# Patient Record
Sex: MALE | Race: BLACK OR AFRICAN AMERICAN | Employment: OTHER | URBAN - METROPOLITAN AREA
[De-identification: names, ages, dates, MRNs, and addresses within clinical notes are randomized per-mention and may not be internally consistent; named-entity substitution may affect disease eponyms.]

---

## 2017-12-12 ENCOUNTER — OFFICE VISIT (OUTPATIENT)
Dept: ORTHOPEDIC SURGERY | Age: 82
End: 2017-12-12

## 2017-12-12 VITALS
HEIGHT: 63 IN | RESPIRATION RATE: 16 BRPM | SYSTOLIC BLOOD PRESSURE: 142 MMHG | TEMPERATURE: 98 F | WEIGHT: 179 LBS | DIASTOLIC BLOOD PRESSURE: 90 MMHG | BODY MASS INDEX: 31.71 KG/M2 | HEART RATE: 58 BPM

## 2017-12-12 DIAGNOSIS — M47.816 LUMBAR FACET ARTHROPATHY: Primary | ICD-10-CM

## 2017-12-12 DIAGNOSIS — R26.89 BALANCE DISORDER: ICD-10-CM

## 2017-12-12 DIAGNOSIS — M51.36 DDD (DEGENERATIVE DISC DISEASE), LUMBAR: ICD-10-CM

## 2017-12-12 DIAGNOSIS — R26.9 GAIT ABNORMALITY: ICD-10-CM

## 2017-12-12 DIAGNOSIS — M62.838 MUSCLE SPASM: ICD-10-CM

## 2017-12-12 RX ORDER — MONTELUKAST SODIUM 10 MG/1
TABLET ORAL
Status: ON HOLD | COMMUNITY
Start: 2017-07-19 | End: 2019-09-17 | Stop reason: SDUPTHER

## 2017-12-12 RX ORDER — RIVASTIGMINE TARTRATE 1.5 MG/1
CAPSULE ORAL
Status: ON HOLD | COMMUNITY
Start: 2017-12-03 | End: 2019-09-17 | Stop reason: SDUPTHER

## 2017-12-12 RX ORDER — HYDROCODONE BITARTRATE AND ACETAMINOPHEN 5; 325 MG/1; MG/1
TABLET ORAL
COMMUNITY
Start: 2017-11-10 | End: 2019-03-21

## 2017-12-12 NOTE — PATIENT INSTRUCTIONS
Low Back Arthritis: Exercises  Your Care Instructions  Here are some examples of typical rehabilitation exercises for your condition. Start each exercise slowly. Ease off the exercise if you start to have pain. Your doctor or physical therapist will tell you when you can start these exercises and which ones will work best for you. When you are not being active, find a comfortable position for rest. Some people are comfortable on the floor or a medium-firm bed with a small pillow under their head and another under their knees. Some people prefer to lie on their side with a pillow between their knees. Don't stay in one position for too long. Take short walks (10 to 20 minutes) every 2 to 3 hours. Avoid slopes, hills, and stairs until you feel better. Walk only distances you can manage without pain, especially leg pain. How to do the exercises  Pelvic tilt    1. Lie on your back with your knees bent. 2. \"Brace\" your stomach-tighten your muscles by pulling in and imagining your belly button moving toward your spine. 3. Press your lower back into the floor. You should feel your hips and pelvis rock back. 4. Hold for 6 seconds while breathing smoothly. 5. Relax and allow your pelvis and hips to rock forward. 6. Repeat 8 to 12 times. Back stretches    1. Get down on your hands and knees on the floor. 2. Relax your head and allow it to droop. Round your back up toward the ceiling until you feel a nice stretch in your upper, middle, and lower back. Hold this stretch for as long as it feels comfortable, or about 15 to 30 seconds. 3. Return to the starting position with a flat back while you are on your hands and knees. 4. Let your back sway by pressing your stomach toward the floor. Lift your buttocks toward the ceiling. 5. Hold this position for 15 to 30 seconds. 6. Repeat 2 to 4 times. Follow-up care is a key part of your treatment and safety.  Be sure to make and go to all appointments, and call your doctor if you are having problems. It's also a good idea to know your test results and keep a list of the medicines you take. Where can you learn more? Go to http://hayde-roxanne.info/. Enter K667 in the search box to learn more about \"Low Back Arthritis: Exercises. \"  Current as of: March 21, 2017  Content Version: 11.4  © 4054-2942 [employer]. Care instructions adapted under license by [employer] (which disclaims liability or warranty for this information). If you have questions about a medical condition or this instruction, always ask your healthcare professional. [place] [address] any warranty or liability for your use of this information. Shoulder Arthritis: Exercises  Your Care Instructions  Here are some examples of typical rehabilitation exercises for your condition. Start each exercise slowly. Ease off the exercise if you start to have pain. Your doctor or physical therapist will tell you when you can start these exercises and which ones will work best for you. How to do the exercises  Shoulder flexion (lying down)    To make a wand for this exercise, use a piece of PVC pipe or a broom handle with the broom removed. Make the wand about a foot wider than your shoulders. 7. Lie on your back, holding a wand with both hands. Your palms should face down as you hold the wand. 8. Keeping your elbows straight, slowly raise your arms over your head. Raise them until you feel a stretch in your shoulders, upper back, and chest.  9. Hold for 15 to 30 seconds. 10. Repeat 2 to 4 times. Shoulder rotation (lying down)    To make a wand for this exercise, use a piece of PVC pipe or a broom handle with the broom removed. Make the wand about a foot wider than your shoulders. 7. Lie on your back. Hold a wand with both hands with your elbows bent and palms up.   8. Keep your elbows close to your body, and move the wand across your body toward the sore arm.  9. Hold for 8 to 12 seconds. 10. Repeat 2 to 4 times. Shoulder internal rotation with towel    1. Hold a towel above and behind your head with the arm that is not sore. 2. With your sore arm, reach behind your back and grasp the towel. 3. With the arm above your head, pull the towel upward. Do this until you feel a stretch on the front and outside of your sore shoulder. 4. Hold 15 to 30 seconds. 5. Repeat 2 to 4 times. Shoulder blade squeeze    1. Stand with your arms at your sides, and squeeze your shoulder blades together. Do not raise your shoulders up as you squeeze. 2. Hold 6 seconds. 3. Repeat 8 to 12 times. Resisted rows    For this exercise, you will need elastic exercise material, such as surgical tubing or Thera-Band. 1. Put the band around a solid object at about waist level. (A bedpost will work well.) Each hand should hold an end of the band. 2. With your elbows at your sides and bent to 90 degrees, pull the band back. Your shoulder blades should move toward each other. Return to the starting position. 3. Repeat 8 to 12 times. External rotator strengthening exercise    1. Start by tying a piece of elastic exercise material to a doorknob. You can use surgical tubing or Thera-Band. (You may also hold one end of the band in each hand.)  2. Stand or sit with your shoulder relaxed and your elbow bent 90 degrees. Your upper arm should rest comfortably against your side. Squeeze a rolled towel between your elbow and your body for comfort. This will help keep your arm at your side. 3. Hold one end of the elastic band with the hand of the painful arm. 4. Start with your forearm across your belly. Slowly rotate the forearm out away from your body. Keep your elbow and upper arm tucked against the towel roll or the side of your body until you begin to feel tightness in your shoulder. Slowly move your arm back to where you started. 5. Repeat 8 to 12 times.   Internal rotator strengthening exercise    1. Start by tying a piece of elastic exercise material to a doorknob. You can use surgical tubing or Thera-Band. 2. Stand or sit with your shoulder relaxed and your elbow bent 90 degrees. Your upper arm should rest comfortably against your side. Squeeze a rolled towel between your elbow and your body for comfort. This will help keep your arm at your side. 3. Hold one end of the elastic band in the hand of the painful arm. 4. Slowly rotate your forearm toward your body until it touches your belly. Slowly move it back to where you started. 5. Keep your elbow and upper arm firmly tucked against the towel roll or at your side. 6. Repeat 8 to 12 times. Pendulum swing    If you have pain in your back, do not do this exercise. 1. Hold on to a table or the back of a chair with your good arm. Then bend forward a little and let your sore arm hang straight down. This exercise does not use the arm muscles. Rather, use your legs and your hips to create movement that makes your arm swing freely. 2. Use the movement from your hips and legs to guide the slightly swinging arm back and forth like a pendulum (or elephant trunk). Then guide it in circles that start small (about the size of a dinner plate). Make the circles a bit larger each day, as your pain allows. 3. Do this exercise for 5 minutes, 5 to 7 times each day. 4. As you have less pain, try bending over a little farther to do this exercise. This will increase the amount of movement at your shoulder. Follow-up care is a key part of your treatment and safety. Be sure to make and go to all appointments, and call your doctor if you are having problems. It's also a good idea to know your test results and keep a list of the medicines you take. Where can you learn more? Go to http://hayde-roxanne.info/. Enter H562 in the search box to learn more about \"Shoulder Arthritis: Exercises. \"  Current as of: March 21, 2017  Content Version: 11.4  © 2544-6430 [employer]. Care instructions adapted under license by [employer] (which disclaims liability or warranty for this information). If you have questions about a medical condition or this instruction, always ask your healthcare professional. [address] any warranty or liability for your use of this information.

## 2017-12-12 NOTE — PROGRESS NOTES
[facility]  [address], [facility] [address]  Phone: [phone]  Fax: [phone]    NEW PATIENT    ASSESSMENT   Diagnoses and all orders for this visit:    1. Lumbar facet arthropathy  -     REFERRAL TO PHYSICAL THERAPY    2. Muscle spasm  -     REFERRAL TO PHYSICAL THERAPY    3. Gait abnormality  -     REFERRAL TO PHYSICAL THERAPY    4. Balance disorder  -     REFERRAL TO PHYSICAL THERAPY    5. DDD (degenerative disc disease), lumbar  -     REFERRAL TO PHYSICAL THERAPY         IMPRESSION AND PLAN:  Pyeton Francois is a 80 y.o. male with history of lumbar pain. He complains of pain across the lower back and denies any pain radiating down the legs at this time. Pt ambulates with the assistance of a rolling walker and denies any weakness in the legs at this time. He has been using Norco 5-325 mg every other day as his pain warrants. 1) Pt was given information on lumbar and shoulder arthritis exercises. 2) Discussed treatment options with the Pt, including medication, physical therapy, and steroid injections. 3) He was encouraged to continue ambulating with the assistance of a rolling walker. 4) Pt was referred to [facility] physical therapy for balance, gait, and strengthening. 5) I recommended the patient try Tylenol Arthritis 1-2 tabs QAM and 1-2 tabs QHS prn. 6) I encouraged the patient to try moist heat. 7) Mr. Pansy Cranker has a reminder for a \"due or due soon\" health maintenance. I have asked that he contact his primary care provider, Arben Barajas MD, for follow-up on this health maintenance. 8)  demonstrated consistency with prescribing. 9) Pt will follow-up in 6 weeks or sooner if needed. HISTORY OF PRESENT ILLNESS:  Peyton Francois is a 80 y.o. right hand dominant male with history of lumbar pain. Pt presents to the office today as a new patient referred by Dr. Kathy Paulino.  He complains of pain across the lower back and denies any pain radiating down the legs at this time. Pt ambulates with the assistance of a rolling walker, admits to chronic issues with balance, and denies any weakness or numbness in the legs at this time. He used to be followed by a chiropractor when he was younger. Pt denies any inciting injuries. He woke up during the middle of the night about 1 month ago to use the bathroom but experienced severe pain in the lower back. Pt then went to the ER due to the lower back pain where he was prescribed Norco 5-325 mg. He states that he generally takes the Norco every other day as his pain warrants. Pt is not on blood thinners and admits that he does not regularly take aspirin. He is diabetic, states that his blood sugars are well controlled at this time, and reports that his last A1C was below 7. Pt at this time desires to proceed with a referral to [facility] physical therapy and medication evaluation. Pt reports that when he retired from [employer] he became a . Of note, patient has prostate cancer years ago and is unsure if this was related to prostate cancer. He denies any prior joint replacements. Pain Scale: 2/10     PCP: Clyde Carpenter MD      Past Medical History:   Diagnosis Date    Arthritis     Cancer [facility])     Coronary atherosclerosis of native coronary artery     AS DESCRIBED IN CATH CARDIAC CATH IN 9/07 SHOWS 80% DISTAL LAD AND 40% PROXIMAL LAD DISEASE . HE IS ON MEDICAL TREATMENT. ASA AND B-BLOCKER 3/10 RENAL ARTERY DUPLEX : NL RT RENAL ARTERY WITH < 60% STENOSIS OF LEFT RENAL ARTERY    Diabetes ([address]     Glaucoma     Hypertension     Impotence of organic origin     Malignant neoplasm of prostate ([address]     Other and unspecified hyperlipidemia         Social History     Social History    Marital status:      Spouse name: N/A    Number of children: N/A    Years of education: N/A     Occupational History    Not on file.      Social History Main Topics    Smoking status: Never Smoker    Smokeless tobacco: Never Used    Alcohol use No    Drug use: Not on file    Sexual activity: Not on file     Other Topics Concern    Not on file     Social History Narrative       Current Outpatient Prescriptions   Medication Sig Dispense Refill    montelukast (SINGULAIR) 10 mg tablet TAKE ONE TABLET BY MOUTH EVERY NIGHT AT BEDTIME      rivastigmine tartrate (EXELON) 1.5 mg capsule TAKE ONE CAPSULE BY MOUTH TWICE DAILY WITH MEALS      SITagliptin (JANUVIA) 100 mg tablet TAKE 1 TABLET BY MOUTH ONCE DAILY      HYDROcodone-acetaminophen (NORCO) 5-325 mg per tablet Take 1 Tab by Mouth Every 6 Hours As Needed for Pain.  cloNIDine (CATAPRES-TTS-2) 0.2 mg/24 hr patch 1 Patch by TransDERmal route every seven (7) days.  valsartan-hydrochlorothiazide (DIOVAN HCT) 160-12.5 mg per tablet Take 1 Tab by mouth daily.  pioglitazone (ACTOS) 45 mg tablet Take  by mouth daily.  alfuzosin SR (UROXATRAL) 10 mg SR tablet Take  by mouth nightly as needed.  FERROUS SULFATE (FEOSOL PO) Take  by mouth two (2) times a day.  simvastatin (ZOCOR) 80 mg tablet Take 80 mg by mouth daily.  ramipril (ALTACE) 5 mg capsule Take  by mouth daily.  metoprolol-XL (TOPROL XL) 50 mg XL tablet Take  by mouth daily.  amlodipine (NORVASC) 5 mg tablet Take 5 mg by mouth daily.  fexofenadine (ALLEGRA) 60 mg tablet Take 60 mg by mouth two (2) times a day.  aspirin (ASPIRIN) 325 mg tablet Take 325 mg by mouth daily. No Known Allergies    REVIEW OF SYSTEMS    Constitutional: Negative for fever, chills, or weight change. Respiratory: Negative for cough or shortness of breath. Cardiovascular: Negative for chest pain or palpitations. Gastrointestinal: Negative for acid reflux, change in bowel habits, or constipation. Genitourinary: Negative for dysuria and flank pain. Musculoskeletal: Positive for lumbar pain. Skin: Negative for rash. Neurological: Negative for headaches, dizziness, or numbness. Endo/Heme/Allergies: Negative for increased bruising. Psychiatric/Behavioral: Negative for difficulty with sleep. PHYSICAL EXAMINATION  Visit Vitals    /90    Pulse (!) 58    Temp 98 °F (36.7 °C) (Oral)    Resp 16    Ht 5' 3\" (1.6 m)    Wt 179 lb (81.2 kg)    BMI 31.71 kg/m2       Constitutional: Awake, alert, and in no acute distress. HEENT: Normocephalic. Atraumatic. Oropharynx is moist and clear. PERRL. EOMI. Sclerae are nonicteric  Heart: Regular rate and rhythm  Lungs: Clear to auscultation bilaterally  Abdomen: Soft and nontender. Bowel sounds are present  Neurological: 1+ symmetrical DTRs in the upper extremities. 1+ symmetrical DTRs in the lower extremities. Sensation to light touch is intact. Negative Surya's sign bilaterally. Skin: warm, dry, and intact. Musculoskeletal: Good range of motion in the cervical spine on all planes. Abduction of the left arm to 90 degrees. Tenderness to palpation in the lower lumbar region. Moderate pain with extension and axial loading. Better with forward flexion. No pain with internal or external rotation of his hips. Negative straight leg raise bilaterally. Patient ambulates with the assistance of a rolling walker. Biceps  Triceps Deltoids Wrist Ext Wrist Flex Hand Intrin   Right +4/5 +4/5 +4/5 +4/5 +4/5 +4/5   Left +4/5 +4/5 +4/5 +4/5 +4/5 +4/5      Hip Flex  Quads Hamstrings Ankle DF EHL Ankle PF   Right +4/5 +4/5 +4/5 +4/5 +4/5 +4/5   Left +4/5 +4/5 +4/5 +4/5 +4/5 +4/5     IMAGING:    Lumbar spine x-rays from 11/10/2017 were personally reviewed with the patient and demonstrated:  FINDINGS:    AP, lateral, and bilateral oblique views of the lumbar spine are submitted for evaluation.     There is no evidence of fracture or subluxation.      Mild levocurvature of the lumbar spine.  There is osteophytosis at all levels.  There is mild disc height narrowing at the most levels most noted at L1/2 and L2/3.  There are facet degenerative changes of the lower lumbar spine.  There are degenerative changes of the visualized SI joints and hips.  Osseous mineralization is radiographically decreased.  There are multiple surgical clips in the pelvis. IMPRESSION:  1.  Osteopenia.  Multilevel degenerative changes.  No acute fracture. Written by Ayah Parada, as dictated by Louis Mckenzie MD.  I, Dr. Louis Mckenzie confirm that all documentation is accurate.

## 2017-12-12 NOTE — MR AVS SNAPSHOT
Visit Information Date & Time Provider Department Dept. Phone Encounter #  
 12/12/2017 10:00 AM Gwendolyn Samaniego, 27 [facility]  Follow-up Instructions Return in about 6 weeks (around 1/23/2018) for PT follow up. Upcoming Health Maintenance Date Due DTaP/Tdap/Td series (1 - Tdap) 3/11/1950 ZOSTER VACCINE AGE 60> 1/11/1989 GLAUCOMA SCREENING Q2Y 3/11/1994 Pneumococcal 65+ High/Highest Risk (1 of 2 - PCV13) 3/11/1994 MEDICARE YEARLY EXAM 3/11/1994 Influenza Age 5 to Adult 8/1/2017 Allergies as of 12/12/2017  Review Complete On: 12/12/2017 By: Gwendolyn Samaniego MD  
 No Known Allergies Current Immunizations  Never Reviewed No immunizations on file. Not reviewed this visit You Were Diagnosed With   
  
 Codes Comments Lumbar facet arthropathy    -  Primary ICD-10-CM: M12.88 ICD-9-CM: 721.3 Muscle spasm     ICD-10-CM: Q93.892 ICD-9-CM: 728.85 Gait abnormality     ICD-10-CM: R26.9 ICD-9-CM: 837. 2 Balance disorder     ICD-10-CM: R26.89 
ICD-9-CM: 781.99   
 DDD (degenerative disc disease), lumbar     ICD-10-CM: M51.36 
ICD-9-CM: 722.52 Vitals BP Pulse Temp Resp Height(growth percentile) Weight(growth percentile) 142/90 (!) 58 98 °F (36.7 °C) (Oral) 16 5' 3\" (1.6 m) 179 lb (81.2 kg) BMI Smoking Status 31.71 kg/m2 Never Smoker BMI and BSA Data Body Mass Index Body Surface Area 31.71 kg/m 2 1.9 m 2 Preferred Pharmacy Pharmacy Name Phone [facility] [phone] [address], [address] [place] [address] [phone] Your Updated Medication List  
  
   
This list is accurate as of: 12/12/17 11:37 AM.  Always use your most recent med list.  
  
  
  
  
 ACTOS 45 mg tablet Generic drug:  pioglitazone Take  by mouth daily. ALLEGRA 60 mg tablet Generic drug:  fexofenadine Take 60 mg by mouth two (2) times a day. ALTACE 5 mg capsule Generic drug:  ramipril Take  by mouth daily. aspirin 325 mg tablet Commonly known as:  ASPIRIN Take 325 mg by mouth daily. CATAPRES-TTS-2 0.2 mg/24 hr patch Generic drug:  cloNIDine 1 Patch by TransDERmal route every seven (7) days. DIOVAN -12.5 mg per tablet Generic drug:  valsartan-hydroCHLOROthiazide Take 1 Tab by mouth daily. FEOSOL PO Take  by mouth two (2) times a day. HYDROcodone-acetaminophen 5-325 mg per tablet Commonly known as:  March Castles Take 1 Tab by Mouth Every 6 Hours As Needed for Pain. JANUVIA 100 mg tablet Generic drug:  SITagliptin TAKE 1 TABLET BY MOUTH ONCE DAILY  
  
 montelukast 10 mg tablet Commonly known as:  SINGULAIR  
TAKE ONE TABLET BY MOUTH EVERY NIGHT AT BEDTIME  
  
 NORVASC 5 mg tablet Generic drug:  amLODIPine Take 5 mg by mouth daily. rivastigmine tartrate 1.5 mg capsule Commonly known as:  EXELON  
TAKE ONE CAPSULE BY MOUTH TWICE DAILY WITH MEALS  
  
 simvastatin 80 mg tablet Commonly known as:  ZOCOR Take 80 mg by mouth daily. TOPROL XL 50 mg XL tablet Generic drug:  metoprolol succinate Take  by mouth daily. UROXATRAL 10 mg SR tablet Generic drug:  alfuzosin SR Take  by mouth nightly as needed. We Performed the Following REFERRAL TO PHYSICAL THERAPY [QDH15 Custom] Comments:  
 Eval/Treat Aqua Therapy, balance/gait/strengthening. Follow-up Instructions Return in about 6 weeks (around 1/23/2018) for PT follow up. Referral Information Referral ID Referred By Referred To  
  
 [id number] Nimesh Askew Not Available Visits Status Start Date End Date 1 New Request 12/12/17 12/12/18 If your referral has a status of pending review or denied, additional information will be sent to support the outcome of this decision. Patient Instructions Low Back Arthritis: Exercises Your Care Instructions Here are some examples of typical rehabilitation exercises for your condition. Start each exercise slowly. Ease off the exercise if you start to have pain. Your doctor or physical therapist will tell you when you can start these exercises and which ones will work best for you. When you are not being active, find a comfortable position for rest. Some people are comfortable on the floor or a medium-firm bed with a small pillow under their head and another under their knees. Some people prefer to lie on their side with a pillow between their knees. Don't stay in one position for too long. Take short walks (10 to 20 minutes) every 2 to 3 hours. Avoid slopes, hills, and stairs until you feel better. Walk only distances you can manage without pain, especially leg pain. How to do the exercises Pelvic tilt 1. Lie on your back with your knees bent. 2. \"Brace\" your stomach-tighten your muscles by pulling in and imagining your belly button moving toward your spine. 3. Press your lower back into the floor. You should feel your hips and pelvis rock back. 4. Hold for 6 seconds while breathing smoothly. 5. Relax and allow your pelvis and hips to rock forward. 6. Repeat 8 to 12 times. Back stretches 1. Get down on your hands and knees on the floor. 2. Relax your head and allow it to droop. Round your back up toward the ceiling until you feel a nice stretch in your upper, middle, and lower back. Hold this stretch for as long as it feels comfortable, or about 15 to 30 seconds. 3. Return to the starting position with a flat back while you are on your hands and knees. 4. Let your back sway by pressing your stomach toward the floor. Lift your buttocks toward the ceiling. 5. Hold this position for 15 to 30 seconds. 6. Repeat 2 to 4 times. Follow-up care is a key part of your treatment and safety.  Be sure to make and go to all appointments, and call your doctor if you are having problems. It's also a good idea to know your test results and keep a list of the medicines you take. Where can you learn more? Go to http://hayde-roxanne.info/. Enter J907 in the search box to learn more about \"Low Back Arthritis: Exercises. \" Current as of: March 21, 2017 Content Version: 11.4 © 1216-3933 [employer]. Care instructions adapted under license by [employer] (which disclaims liability or warranty for this information). If you have questions about a medical condition or this instruction, always ask your healthcare professional. [address] any warranty or liability for your use of this information. Shoulder Arthritis: Exercises Your Care Instructions Here are some examples of typical rehabilitation exercises for your condition. Start each exercise slowly. Ease off the exercise if you start to have pain. Your doctor or physical therapist will tell you when you can start these exercises and which ones will work best for you. How to do the exercises Shoulder flexion (lying down) To make a wand for this exercise, use a piece of PVC pipe or a broom handle with the broom removed. Make the wand about a foot wider than your shoulders. 7. Lie on your back, holding a wand with both hands. Your palms should face down as you hold the wand. 8. Keeping your elbows straight, slowly raise your arms over your head. Raise them until you feel a stretch in your shoulders, upper back, and chest. 
9. Hold for 15 to 30 seconds. 10. Repeat 2 to 4 times. Shoulder rotation (lying down) To make a wand for this exercise, use a piece of PVC pipe or a broom handle with the broom removed. Make the wand about a foot wider than your shoulders. 7. Lie on your back. Hold a wand with both hands with your elbows bent and palms up. 8. Keep your elbows close to your body, and move the wand across your body toward the sore arm. 9. Hold for 8 to 12 seconds. 10. Repeat 2 to 4 times. Shoulder internal rotation with towel 1. Hold a towel above and behind your head with the arm that is not sore. 2. With your sore arm, reach behind your back and grasp the towel. 3. With the arm above your head, pull the towel upward. Do this until you feel a stretch on the front and outside of your sore shoulder. 4. Hold 15 to 30 seconds. 5. Repeat 2 to 4 times. Shoulder blade squeeze 1. Stand with your arms at your sides, and squeeze your shoulder blades together. Do not raise your shoulders up as you squeeze. 2. Hold 6 seconds. 3. Repeat 8 to 12 times. Resisted rows For this exercise, you will need elastic exercise material, such as surgical tubing or Thera-Band. 1. Put the band around a solid object at about waist level. (A bedpost will work well.) Each hand should hold an end of the band. 2. With your elbows at your sides and bent to 90 degrees, pull the band back. Your shoulder blades should move toward each other. Return to the starting position. 3. Repeat 8 to 12 times. External rotator strengthening exercise 1. Start by tying a piece of elastic exercise material to a doorknob. You can use surgical tubing or Thera-Band. (You may also hold one end of the band in each hand.) 2. Stand or sit with your shoulder relaxed and your elbow bent 90 degrees. Your upper arm should rest comfortably against your side. Squeeze a rolled towel between your elbow and your body for comfort. This will help keep your arm at your side. 3. Hold one end of the elastic band with the hand of the painful arm. 4. Start with your forearm across your belly. Slowly rotate the forearm out away from your body.  Keep your elbow and upper arm tucked against the towel roll or the side of your body until you begin to feel tightness in your shoulder. Slowly move your arm back to where you started. 5. Repeat 8 to 12 times. Internal rotator strengthening exercise 1. Start by tying a piece of elastic exercise material to a doorknob. You can use surgical tubing or Thera-Band. 2. Stand or sit with your shoulder relaxed and your elbow bent 90 degrees. Your upper arm should rest comfortably against your side. Squeeze a rolled towel between your elbow and your body for comfort. This will help keep your arm at your side. 3. Hold one end of the elastic band in the hand of the painful arm. 4. Slowly rotate your forearm toward your body until it touches your belly. Slowly move it back to where you started. 5. Keep your elbow and upper arm firmly tucked against the towel roll or at your side. 6. Repeat 8 to 12 times. Pendulum swing If you have pain in your back, do not do this exercise. 1. Hold on to a table or the back of a chair with your good arm. Then bend forward a little and let your sore arm hang straight down. This exercise does not use the arm muscles. Rather, use your legs and your hips to create movement that makes your arm swing freely. 2. Use the movement from your hips and legs to guide the slightly swinging arm back and forth like a pendulum (or elephant trunk). Then guide it in circles that start small (about the size of a dinner plate). Make the circles a bit larger each day, as your pain allows. 3. Do this exercise for 5 minutes, 5 to 7 times each day. 4. As you have less pain, try bending over a little farther to do this exercise. This will increase the amount of movement at your shoulder. Follow-up care is a key part of your treatment and safety. Be sure to make and go to all appointments, and call your doctor if you are having problems. It's also a good idea to know your test results and keep a list of the medicines you take. Where can you learn more? Go to http://hayde-roxanne.info/. Enter H562 in the search box to learn more about \"Shoulder Arthritis: Exercises. \" Current as of: March 21, 2017 Content Version: 11.4 © 1730-5610 Healthwise, [employer]. Care instructions adapted under license by [employer] (which disclaims liability or warranty for this information). If you have questions about a medical condition or this instruction, always ask your healthcare professional. [address] any warranty or liability for your use of this information. Introducing [facility]! Tia Mariscal introduces [employer] patient portal. Now you can access parts of your medical record, email your doctor's office, and request medication refills online. 1. In your internet browser, go to [email or web address] 2. Click on the First Time User? Click Here link in the Sign In box. You will see the New Member Sign Up page. 3. Enter your [employer] Access Code exactly as it appears below. You will not need to use this code after youve completed the sign-up process. If you do not sign up before the expiration date, you must request a new code. · [employer] Access Code: [email or web address] Expires: 3/12/2018 11:37 AM 
 
4. Enter the last four digits of your Social Security Number (xxxx) and Date of Birth (mm/dd/yyyy) as indicated and click Submit. You will be taken to the next sign-up page. 5. Create a [employer] ID. This will be your [employer] login ID and cannot be changed, so think of one that is secure and easy to remember. 6. Create a [employer] password. You can change your password at any time. 7. Enter your Password Reset Question and Answer. This can be used at a later time if you forget your password. 8. Enter your e-mail address. You will receive e-mail notification when new information is available in [address]. 9. Click Sign Up. You can now view and download portions of your medical record. 10. Click the Download Summary menu link to download a portable copy of your medical information. If you have questions, please visit the Frequently Asked Questions section of the [employer] website. Remember, [employer] is NOT to be used for urgent needs. For medical emergencies, dial 911. Now available from your iPhone and Android! Please provide this summary of care documentation to your next provider. Your primary care clinician is listed as Ade Gray. If you have any questions after today's visit, please call [phone].

## 2017-12-18 ENCOUNTER — HOSPITAL ENCOUNTER (OUTPATIENT)
Dept: PHYSICAL THERAPY | Age: 82
Discharge: HOME | End: 2017-12-18
Payer: MEDICARE

## 2017-12-18 PROCEDURE — 97162 PT EVAL MOD COMPLEX 30 MIN: CPT

## 2017-12-18 PROCEDURE — G8979 MOBILITY GOAL STATUS: HCPCS

## 2017-12-18 PROCEDURE — G8978 MOBILITY CURRENT STATUS: HCPCS

## 2017-12-18 PROCEDURE — 97112 NEUROMUSCULAR REEDUCATION: CPT

## 2017-12-18 PROCEDURE — 97110 THERAPEUTIC EXERCISES: CPT

## 2017-12-18 NOTE — MR AVS SNAPSHOT
Visit Information Date & Time Provider Department Dept. Phone Encounter #  
 12/18/2017 11:00 AM Rachel Leisa, [address]  [place] [facility] [id number] Your Appointments 1/23/2018 10:30 AM  
Follow Up with Loyda Martinez MD  
[facility]) Appt Note: 6 wk f/u  
 [address] [facility] [address]  
[phone]  
  
   
 [address] [facility] [address] Upcoming Health Maintenance Date Due DTaP/Tdap/Td series (1 - Tdap) 3/11/1950 ZOSTER VACCINE AGE 60> 1/11/1989 GLAUCOMA SCREENING Q2Y 3/11/1994 Pneumococcal 65+ High/Highest Risk (1 of 2 - PCV13) 3/11/1994 MEDICARE YEARLY EXAM 3/11/1994 Influenza Age 5 to Adult 8/1/2017 Allergies as of 12/18/2017  Review Complete On: 12/12/2017 By: Loyda Martinez MD  
 No Known Allergies Current Immunizations  Never Reviewed No immunizations on file. Not reviewed this visit Vitals Smoking Status Never Smoker Your Updated Medication List  
  
ASK your doctor about these medications ACTOS 45 mg tablet Generic drug:  pioglitazone Take  by mouth daily. ALLEGRA 60 mg tablet Generic drug:  fexofenadine Take 60 mg by mouth two (2) times a day. ALTACE 5 mg capsule Generic drug:  ramipril Take  by mouth daily. aspirin 325 mg tablet Commonly known as:  ASPIRIN Take 325 mg by mouth daily. CATAPRES-TTS-2 0.2 mg/24 hr patch Generic drug:  cloNIDine 1 Patch by TransDERmal route every seven (7) days. DIOVAN -12.5 mg per tablet Generic drug:  valsartan-hydroCHLOROthiazide Take 1 Tab by mouth daily. FEOSOL PO Take  by mouth two (2) times a day. HYDROcodone-acetaminophen 5-325 mg per tablet Commonly known as:  Consuelo Hobson Take 1 Tab by Mouth Every 6 Hours As Needed for Pain. JANUVIA 100 mg tablet Generic drug:  SITagliptin TAKE 1 TABLET BY MOUTH ONCE DAILY  
  
 montelukast 10 mg tablet Commonly known as:  SINGULAIR  
TAKE ONE TABLET BY MOUTH EVERY NIGHT AT BEDTIME  
  
 NORVASC 5 mg tablet Generic drug:  amLODIPine Take 5 mg by mouth daily. rivastigmine tartrate 1.5 mg capsule Commonly known as:  EXELON  
TAKE ONE CAPSULE BY MOUTH TWICE DAILY WITH MEALS  
  
 simvastatin 80 mg tablet Commonly known as:  ZOCOR Take 80 mg by mouth daily. TOPROL XL 50 mg XL tablet Generic drug:  metoprolol succinate Take  by mouth daily. UROXATRAL 10 mg SR tablet Generic drug:  alfuzosin SR Take  by mouth nightly as needed. Introducing [facility]! Esperanza Paredes introduces [employer] patient portal. Now you can access parts of your medical record, email your doctor's office, and request medication refills online. 1. In your internet browser, go to [email or web address] 2. Click on the First Time User? Click Here link in the Sign In box. You will see the New Member Sign Up page. 3. Enter your [employer] Access Code exactly as it appears below. You will not need to use this code after youve completed the sign-up process. If you do not sign up before the expiration date, you must request a new code. · [employer] Access Code: [email or web address] Expires: 3/12/2018 11:37 AM 
 
4. Enter the last four digits of your Social Security Number (xxxx) and Date of Birth (mm/dd/yyyy) as indicated and click Submit. You will be taken to the next sign-up page. 5. Create a [employer] ID. This will be your [employer] login ID and cannot be changed, so think of one that is secure and easy to remember. 6. Create a [employer] password. You can change your password at any time. 7. Enter your Password Reset Question and Answer. This can be used at a later time if you forget your password. 8. Enter your e-mail address.  You will receive e-mail notification when new information is available in [employer]. 9. Click Sign Up. You can now view and download portions of your medical record. 10. Click the Download Summary menu link to download a portable copy of your medical information. If you have questions, please visit the Frequently Asked Questions section of the [employer] website. Remember, [employer] is NOT to be used for urgent needs. For medical emergencies, dial 911. Now available from your iPhone and Android! Please provide this summary of care documentation to your next provider. Your primary care clinician is listed as Trinidad Kidd. If you have any questions after today's visit, please call [phone].

## 2017-12-18 NOTE — PROGRESS NOTES
PT DAILY TREATMENT NOTE - [facility]     Patient Name: Anh Salazar  Date:2017  : 3/11/1929  [x]  Patient  Verified  Payor: Karmen Eisenberg / Plan: [place] & [place] / Product Type: Medicare /    In time:11:15  Out time:12:05  Total Treatment Time (min): 50  Total Timed Codes (min): 23  1:1 Treatment Time ( [address] only): 50   Visit #: 1 of 8    Treatment Area: Unsteadiness on feet [R26.81]  Other abnormalities of gait and mobility [R26.89]    SUBJECTIVE  Pain Level (0-10 scale): 0/10  Any medication changes, allergies to medications, adverse drug reactions, diagnosis change, or new procedure performed?: [x] No    [] Yes (see summary sheet for update)  Subjective functional status/changes:   [] No changes reported  The patient has a chief complaint of LBP and unsteadiness on his feet with an occurrence of 3 falls.     OBJECTIVE    Modality rationale:  to improve the patients ability to    Min Type Additional Details    [] Estim:  []Unatt       []IFC  []Premod                        []Other:  []w/ice   []w/heat  Position:  Location:    [] Estim: []Att    []TENS instruct  []NMES                    []Other:  []w/US   []w/ice   []w/heat  Position:  Location:    []  Traction: [] Cervical       []Lumbar                       [] Prone          []Supine                       []Intermittent   []Continuous Lbs:  [] before manual  [] after manual    []  Ultrasound: []Continuous   [] Pulsed                           []1MHz   []3MHz W/cm2:  Location:    []  Iontophoresis with dexamethasone         Location: [] Take home patch   [] In clinic    []  Ice     []  heat  []  Ice massage  []  Laser   []  Anodyne Position:  Location:    []  Laser with stim  []  Other:  Position:  Location:    []  Vasopneumatic Device Pressure:       [] lo [] med [] hi   Temperature: [] lo [] med [] hi   [] Skin assessment post-treatment:  []intact []redness- no adverse reaction    []redness - adverse reaction:     27 min [x]Eval []Re-Eval       10 min Therapeutic Exercise:  [x] See flow sheet :   Rationale: increase ROM and increase strength to improve the patients ability to improve ADL ease. 13 min Neuromuscular Re-education:  [x]  See flow sheet :   Rationale: improve coordination, improve balance and increase proprioception  to improve the patients ability to improve ADL ease. With   [] TE   [] TA   [] neuro   [] other: Patient Education: [x] Review HEP    [] Progressed/Changed HEP based on:   [] positioning   [] body mechanics   [] transfers   [] heat/ice application    [] other:      Other Objective/Functional Measures: See IE     Pain Level (0-10 scale) post treatment: 0/10    ASSESSMENT/Changes in Function: See POC. Patient will continue to benefit from skilled PT services to modify and progress therapeutic interventions, address functional mobility deficits, address ROM deficits, address strength deficits, analyze and address soft tissue restrictions, analyze and cue movement patterns, analyze and modify body mechanics/ergonomics, assess and modify postural abnormalities and instruct in home and community integration to attain remaining goals. []  See Plan of Care  []  See progress note/recertification  []  See Discharge Summary         Progress towards goals / Updated goals:  Short Term Goals: To be accomplished in 2 weeks:                        1. The patient will be independent and compliant with HEP to maximize therapeutic benefit. Long Term Goals: To be accomplished in 4 weeks:                        1. The patient will improve FOTO score to 66 lumbar in order to better improve quality of life. 2. The patient will display TUG of 20\" without AD in order to improve efficiency of home ambulation. 3. The patient will improve DGI to 14/24 in order to reduce falls risk.                         4. The patient will demonstrate sit to stand 30\" chair rise test to 8 repetitions in order to maximize efficiency of transfers in the home.     PLAN  []  Upgrade activities as tolerated     [x]  Continue plan of care  []  Update interventions per flow sheet       []  Discharge due to:_  []  Other:_      Bree Celeste, PT 12/18/2017  1:48 PM    Future Appointments  Date Time Provider Kaleigh Marybeth   12/29/2017 11:00 AM Yohana Lila [address] [facility]   1/2/2018 2:00 PM Ala Tay, PTA [facility]   1/4/2018 11:30 AM Shivani Sol, PTA [facility]   1/9/2018 11:30 AM Bree Celeste, PT [facility]   1/11/2018 11:30 AM Shivani Sol, PTA [facility]   1/16/2018 11:30 AM Ala Tay, PTA [facility]   1/18/2018 11:30 AM Shivani Sol, PTA [facility]   1/23/2018 10:30 AM Donna Monreal  [address]   1/24/2018 11:30 AM Bree Celeste, PT [facility]   1/25/2018 11:30 AM Shivani Sol, PTA [facility]

## 2017-12-18 NOTE — PROGRESS NOTES
[facility]  [address]  [place], [address]  [phone] fax    Plan of Care/ Statement of Necessity for Physical Therapy Services    Patient name: Charlene Dave Start of Care: 2017   Referral source: Ezra Barbosa MD : 3/11/1929    Medical Diagnosis: Unsteadiness on feet [R26.81]  Other abnormalities of gait and mobility [R26.89]   Onset Date:1 month ago Lumbar pain, 12 months ago balance    Treatment Diagnosis: LBP/poor balance   Prior Hospitalization: see medical history Provider#: [id number]   Medications: Verified on Patient summary List    Comorbidities: Diabetes, HTN, hx prostate cancer with related surgery   Prior Level of Function: The patient states that he was able to ambulate void of RW and did not have falls prior to 1 year ago. The Plan of Care and following information is based on the information from the initial evaluation. Assessment/ key information: The patient is an 80year old male with a chief complaint of poor balance and occasional back pain. He states his back pain onset started about 1 month ago with an insidious onset, but has been well controlled with medication. He indicates radiographs were taken which did show arthritic changes per patient report. States he began using a RW about 1 year ago due suffering 3 falls, all non-specific events, but the patient does indicate that they were during dynamic gait and walking of some sort. He has signs and symptoms consistent with mechanical low back and and decreased balance with impairments consisting of intermittent pain, decreased ROM, decreasdd flexibility, decreased balance, positive falls risk, and decreased efficiency of transfers. The patient will benefit from skilled PT in order to address the aformentioned impairments. He does present with RW, and negotiates stairs with step to pattern utilizing B HR for safety.  Due to height of aquatics steps and limited security upon descent with only 1 handrail as well as patient marking incontinence issues on FOTO assessment, it was recommended to the patient to engage in land-based physical therapy, of which the grand-daughter and caretaker agreed. The patient is happy with this plan of care. Evaluation Complexity History HIGH Complexity :3+ comorbidities / personal factors will impact the outcome/ POC ; Examination MEDIUM Complexity : 3 Standardized tests and measures addressing body structure, function, activity limitation and / or participation in recreation  ;Presentation MEDIUM Complexity : Evolving with changing characteristics  ; Clinical Decision Making MEDIUM Complexity : FOTO score of 26-74  Overall Complexity Rating: MEDIUM  Problem List: pain affecting function, decrease ROM, decrease strength, impaired gait/ balance, decrease ADL/ functional abilitiies, decrease activity tolerance, decrease flexibility/ joint mobility and decrease transfer abilities   Treatment Plan may include any combination of the following: Therapeutic exercise, Therapeutic activities, Neuromuscular re-education, Physical agent/modality, Gait/balance training, Manual therapy, Patient education and Functional mobility training  Patient / Family readiness to learn indicated by: asking questions, trying to perform skills and interest  Persons(s) to be included in education: patient (P) and family support person (FSP);list patient's grand-daughter acting as caretaker  Barriers to Learning/Limitations: None  Patient Goal (s): to get around better per verbal goal per patient  Patient Self Reported Health Status: fair  Rehabilitation Potential: fair    Short Term Goals: To be accomplished in 2 weeks:   1. The patient will be independent and compliant with HEP to maximize therapeutic benefit. Long Term Goals: To be accomplished in 4 weeks:   1.  The patient will improve FOTO score to 66 lumbar in order to better improve quality of life.   2. The patient will display TUG of 20\" without AD in order to improve efficiency of home ambulation. 3. The patient will improve DGI to 14/24 in order to reduce falls risk. 4. The patient will demonstrate sit to stand 30\" chair rise test to 8 repetitions in order to maximize efficiency of transfers in the home. Frequency / Duration: Patient to be seen 2 times per week for 4 weeks. Patient/ Caregiver education and instruction: Diagnosis, prognosis, self care, activity modification and exercises   [x]  Plan of care has been reviewed with PTA    G-Codes (GP)  Mobility   Current  CK= 40-59%   Goal  CJ= 20-39%      The severity rating is based on clinical judgment and the FOTO score. Certification Period: 12/18/2017 - 2/18/2017  Frank Ayala, PT 12/18/2017 1:36 PM    ________________________________________________________________________    I certify that the above Therapy Services are being furnished while the patient is under my care. I agree with the treatment plan and certify that this therapy is necessary.     [de-identified] Signature:____________________  Date:____________Time: _________    Please sign and return to [facility]  [address] [facility] [address]  [place], [address]  [phone] fax

## 2018-01-02 ENCOUNTER — HOSPITAL ENCOUNTER (OUTPATIENT)
Dept: PHYSICAL THERAPY | Age: 83
Discharge: HOME | End: 2018-01-02
Payer: MEDICARE

## 2018-01-02 PROCEDURE — 97110 THERAPEUTIC EXERCISES: CPT

## 2018-01-02 PROCEDURE — 97112 NEUROMUSCULAR REEDUCATION: CPT

## 2018-01-04 ENCOUNTER — APPOINTMENT (OUTPATIENT)
Dept: PHYSICAL THERAPY | Age: 83
End: 2018-01-04
Payer: MEDICARE

## 2018-01-09 ENCOUNTER — HOSPITAL ENCOUNTER (OUTPATIENT)
Dept: PHYSICAL THERAPY | Age: 83
Discharge: HOME | End: 2018-01-09
Payer: MEDICARE

## 2018-01-09 PROCEDURE — 97110 THERAPEUTIC EXERCISES: CPT

## 2018-01-09 PROCEDURE — 97112 NEUROMUSCULAR REEDUCATION: CPT

## 2018-01-09 NOTE — PROGRESS NOTES
PT DAILY TREATMENT NOTE - [facility]     Patient Name: Shilo Ramirez  Date:2018  : 3/11/1929  [x]  Patient  Verified  Payor: Randell Mins / Plan: [place] / Product Type: Medicare /    In time:11:30  Out time: 12:08  Total Treatment Time (min): 38  Total Timed Codes (min): 38  1:1 Treatment Time ([facility] only): 38   Visit #: 3 of 8    Treatment Area: Unsteadiness on feet [R26.81]  Other abnormalities of gait and mobility [R26.89]    SUBJECTIVE  Pain Level (0-10 scale): 0/10  Any medication changes, allergies to medications, adverse drug reactions, diagnosis change, or new procedure performed?: [x] No    [] Yes (see summary sheet for update)  Subjective functional status/changes:   [] No changes reported  The patient stated that he has been doing fairly well. OBJECTIVE  28 min Therapeutic Exercise:  [x] See flow sheet :   Rationale: increase ROM and increase strength to improve the patients ability to improve ADL ease. 10 min Neuromuscular Re-education:  [x]  See flow sheet :   Rationale: improve coordination, improve balance and increase proprioception  to improve the patients ability to improve ADL ease. With   [] TE   [] TA   [] neuro   [] other: Patient Education: [x] Review HEP    [] Progressed/Changed HEP based on:   [] positioning   [] body mechanics   [] transfers   [] heat/ice application    [] other:      Other Objective/Functional Measures:   Tandem stance improvement with ambulation. Noted improvement with sit to stand no UEs. Pain Level (0-10 scale) post treatment: 0/10    ASSESSMENT/Changes in Function: Good progress with dynamic and static balance.     Patient will continue to benefit from skilled PT services to modify and progress therapeutic interventions, address functional mobility deficits, address ROM deficits, address strength deficits, analyze and address soft tissue restrictions, analyze and cue movement patterns, analyze and modify body mechanics/ergonomics, assess and modify postural abnormalities and instruct in home and community integration to attain remaining goals. []  See Plan of Care  []  See progress note/recertification  []  See Discharge Summary         Progress towards goals / Updated goals:  Short Term Goals: To be accomplished in 2 weeks:                        7. The patient will be independent and compliant with HEP to maximize therapeutic benefit. - Pt reports HEP compliance. 1/2/2018  Long Term Goals: To be accomplished in 4 weeks:                        2. The patient will improve FOTO score to 66 lumbar in order to better improve quality of life.                        2. The patient will display TUG of 20\" without AD in order to improve efficiency of home ambulation.                        3. The patient will improve DGI to 14/24 in order to reduce falls risk.                        4. The patient will demonstrate sit to stand 30\" chair rise test to 8 repetitions in order to maximize efficiency of transfers in the home.     PLAN  []  Upgrade activities as tolerated     [x]  Continue plan of care  []  Update interventions per flow sheet       []  Discharge due to:_  []  Other:_      Antoinette Higuera PT 1/9/2018  1:28 PM    Future Appointments  Date Time Provider Kaleigh Rueda   1/11/2018 11:30 AM April Blocker, PTA [facility]   1/16/2018 11:30 AM Nely Hunt PTA [facility]   1/18/2018 11:30 AM April Blocker, PTA [facility]   1/23/2018 10:30 AM Duane Ensign,  [address]   1/24/2018 11:30 AM Antoinette Higuera PT [facility]   1/25/2018 11:30 AM April Blocker, PTA [facility]

## 2018-01-11 ENCOUNTER — HOSPITAL ENCOUNTER (OUTPATIENT)
Dept: PHYSICAL THERAPY | Age: 83
Discharge: HOME | End: 2018-01-11
Payer: MEDICARE

## 2018-01-11 PROCEDURE — 97110 THERAPEUTIC EXERCISES: CPT

## 2018-01-11 PROCEDURE — 97116 GAIT TRAINING THERAPY: CPT

## 2018-01-11 NOTE — PROGRESS NOTES
PT DAILY TREATMENT NOTE - [facility]     Patient Name: Charlee Brambila  Date:2018  : 3/11/1929  [x]  Patient  Verified  Payor: Yoanna Hoyt / Plan: [place] MED[place]ARE PART A & B / Product Type: Medicare /    In time:12:00  Out time:12:30  Total Treatment Time (min): 30  Total Timed Codes (min): 30  1:1 Treatment Time ( only): 30   Visit #: 4 of 8    Treatment Area: Unsteadiness on feet [R26.81]  Other abnormalities of gait and mobility [R26.89]    SUBJECTIVE  Pain Level (0-10 scale): 0/10  Any medication changes, allergies to medications, adverse drug reactions, diagnosis change, or new procedure performed?: [x] No    [] Yes (see summary sheet for update)  Subjective functional status/changes:   [] No changes reported  Pt reports no new complaints. Pt reports feeling tired. OBJECTIVE    12 min Therapeutic Exercise:  [x] See flow sheet :   Rationale: increase ROM and increase strength to improve the patients ability to tolerate ADLs. 10 min Neuromuscular Re-education:  [x]  See flow sheet :   Rationale: increase strength, improve coordination and increase proprioception  to improve the patients ability to perform functional activities. 8 min Gait Training: tug test and gait training per flow sheet   Rationale: With   [] TE   [] TA   [] neuro   [] other: Patient Education: [x] Review HEP    [] Progressed/Changed HEP based on:   [] positioning   [] body mechanics   [] transfers   [] heat/ice application    [] other:      Other Objective/Functional Measures: Pt performed tug test 32 seconds     Pain Level (0-10 scale) post treatment: 0/10    ASSESSMENT/Changes in Function: Pt requires vc's to use walker properly and when ambulating further distances.     Patient will continue to benefit from skilled PT services to modify and progress therapeutic interventions, address functional mobility deficits, address ROM deficits, address strength deficits, analyze and address soft tissue restrictions, analyze and cue movement patterns and analyze and modify body mechanics/ergonomics to attain remaining goals. []  See Plan of Care  []  See progress note/recertification  []  See Discharge Summary         Progress towards goals / Updated goals:  Short Term Goals: To be accomplished in 2 weeks:                        5. The patient will be independent and compliant with HEP to maximize therapeutic benefit. - Pt reports HEP compliance. 1/2/2018  Long Term Goals: To be accomplished in 4 weeks:                        1. The patient will improve FOTO score to 66 lumbar in order to better improve quality of life.                        2. The patient will display TUG of 20\" without AD in order to improve efficiency of home ambulation. - not met TUG 32 \". 1/11/18                        3. The patient will improve DGI to 14/24 in order to reduce falls risk.                        4. The patient will demonstrate sit to stand 30\" chair rise test to 8 repetitions in order to maximize efficiency of transfers in the home.     PLAN  []  Upgrade activities as tolerated     []  Continue plan of care  []  Update interventions per flow sheet       []  Discharge due to:_  []  Other:_      Steve Whelan PTA 1/11/2018  12:55 PM    Future Appointments  Date Time Provider Kaleigh Rueda   1/16/2018 11:30 AM Trista Fong PTA [facility]   1/18/2018 11:30 AM Steve Whelan PTA [facility]   1/23/2018 10:30 AM Lisa Graves  [address]   1/24/2018 11:30 AM Rema Brooks PT [facility]   1/25/2018 11:30 AM Steve Whelan PTA [facility]

## 2018-01-16 ENCOUNTER — HOSPITAL ENCOUNTER (OUTPATIENT)
Dept: PHYSICAL THERAPY | Age: 83
Discharge: HOME | End: 2018-01-16
Payer: MEDICARE

## 2018-01-16 PROCEDURE — 97110 THERAPEUTIC EXERCISES: CPT

## 2018-01-16 PROCEDURE — 97112 NEUROMUSCULAR REEDUCATION: CPT

## 2018-01-18 ENCOUNTER — APPOINTMENT (OUTPATIENT)
Dept: PHYSICAL THERAPY | Age: 83
End: 2018-01-18
Payer: MEDICARE

## 2018-01-23 ENCOUNTER — OFFICE VISIT (OUTPATIENT)
Dept: ORTHOPEDIC SURGERY | Age: 83
End: 2018-01-23

## 2018-01-23 VITALS
OXYGEN SATURATION: 95 % | SYSTOLIC BLOOD PRESSURE: 189 MMHG | HEIGHT: 63 IN | BODY MASS INDEX: 31.89 KG/M2 | WEIGHT: 180 LBS | DIASTOLIC BLOOD PRESSURE: 80 MMHG | HEART RATE: 75 BPM | TEMPERATURE: 98.4 F

## 2018-01-23 DIAGNOSIS — M62.838 MUSCLE SPASM: ICD-10-CM

## 2018-01-23 DIAGNOSIS — M47.816 LUMBAR FACET ARTHROPATHY: Primary | ICD-10-CM

## 2018-01-23 DIAGNOSIS — R26.9 GAIT ABNORMALITY: ICD-10-CM

## 2018-01-23 NOTE — PROGRESS NOTES
[facility]  [address] [place], [facility] [address]  Phone: [phone]  Fax: [phone]      ASSESSMENT   Diagnoses and all orders for this visit:    1. Lumbar facet arthropathy    2. Muscle spasm    3. Gait abnormality         IMPRESSION AND PLAN:  Juan Miguel Prabhakar is a 80 y.o. right hand dominant male with history of lumbar pain. He tried physical therapy since his last office visit with substantial improvement. Pt reports experiencing no lower back pain at this time. 1) Pt was given information on lumbar arthritis exercises. 2) I encouraged the patient to continue with home exercises learned through physical therapy,   3) I recommended the patient try chair yoga. 4) He may take Tylenol Extra Strength as needed. 5) I encouraged the patient to continue using his rolling walker. 6) Mr. Francisca Toussaint has a reminder for a \"due or due soon\" health maintenance. I have asked that he contact his primary care provider, Cornelius Castaneda MD, for follow-up on this health maintenance. 7)  demonstrated consistency with prescribing. 8) Pt will follow-up as needed. HISTORY OF PRESENT ILLNESS:  Juan Miguel Prabhakar is a 80 y.o. right hand dominant male with history of lumbar pain. He tried physical therapy since his last office visit with substantial improvement. Pt states that he is experiencing no lower back pain at this time. He denies any issues with balance or changing positions. Pt states that he perform home exercises at home regularly. Of note, he lives with his granddaughter who makes sure he performed his daily exercises. He denies any association of his pain with changes in weather. He is diabetic and per patient, his blood sugars are well controlled. He presents to the office today with hypertension and is on Diovan, Toprol, clonidine, and ramipril. Pt takes aspirin as needed.  Pt at this time desires to continue with current care.    Pain Scale: 0 - No pain/10    PCP: Yoav Myers MD       Past Medical History:   Diagnosis Date    Arthritis     Cancer [facility])     Coronary atherosclerosis of native coronary artery     AS DESCRIBED IN CATH CARDIAC CATH IN 9/07 SHOWS 80% DISTAL LAD AND 40% PROXIMAL LAD DISEASE . HE IS ON MEDICAL TREATMENT. ASA AND B-BLOCKER 3/10 RENAL ARTERY DUPLEX : NL RT RENAL ARTERY WITH < 60% STENOSIS OF LEFT RENAL ARTERY    Diabetes ([facility] [address]     Glaucoma     Hypertension     Impotence of organic origin     Malignant neoplasm of prostate ([facility] [address]     Other and unspecified hyperlipidemia         Social History     Social History    Marital status:      Spouse name: N/A    Number of children: N/A    Years of education: N/A     Occupational History    Not on file. Social History Main Topics    Smoking status: Never Smoker    Smokeless tobacco: Never Used    Alcohol use No    Drug use: Not on file    Sexual activity: Not on file     Other Topics Concern    Not on file     Social History Narrative       Current Outpatient Prescriptions   Medication Sig Dispense Refill    montelukast (SINGULAIR) 10 mg tablet TAKE ONE TABLET BY MOUTH EVERY NIGHT AT BEDTIME      rivastigmine tartrate (EXELON) 1.5 mg capsule TAKE ONE CAPSULE BY MOUTH TWICE DAILY WITH MEALS      SITagliptin (JANUVIA) 100 mg tablet TAKE 1 TABLET BY MOUTH ONCE DAILY      HYDROcodone-acetaminophen (NORCO) 5-325 mg per tablet Take 1 Tab by Mouth Every 6 Hours As Needed for Pain.  FERROUS SULFATE (FEOSOL PO) Take  by mouth two (2) times a day.  cloNIDine (CATAPRES-TTS-2) 0.2 mg/24 hr patch 1 Patch by TransDERmal route every seven (7) days.  simvastatin (ZOCOR) 80 mg tablet Take 80 mg by mouth daily.  ramipril (ALTACE) 5 mg capsule Take  by mouth daily.  metoprolol-XL (TOPROL XL) 50 mg XL tablet Take  by mouth daily.  valsartan-hydrochlorothiazide (DIOVAN HCT) 160-12.5 mg per tablet Take 1 Tab by mouth daily.  amlodipine (NORVASC) 5 mg tablet Take 5 mg by mouth daily.  pioglitazone (ACTOS) 45 mg tablet Take  by mouth daily.  alfuzosin SR (UROXATRAL) 10 mg SR tablet Take  by mouth nightly as needed.  fexofenadine (ALLEGRA) 60 mg tablet Take 60 mg by mouth two (2) times a day.  aspirin (ASPIRIN) 325 mg tablet Take 81 mg by mouth daily. No Known Allergies      REVIEW OF SYSTEMS    Constitutional: Negative for fever, chills, or weight change. Respiratory: Negative for cough or shortness of breath. Cardiovascular: Negative for chest pain or palpitations. Gastrointestinal: Negative for acid reflux, change in bowel habits, or constipation. Genitourinary: Negative for dysuria and flank pain. Musculoskeletal: Positive for lumbar pain. Skin: Negative for rash. Neurological: Negative for headaches, dizziness, or numbness. Endo/Heme/Allergies: Negative for increased bruising. Psychiatric/Behavioral: Negative for difficulty with sleep. PHYSICAL EXAMINATION  Visit Vitals    /80    Pulse 75    Temp 98.4 °F (36.9 °C) (Oral)    Ht 5' 3\" (1.6 m)    Wt 180 lb (81.6 kg)    SpO2 95%    BMI 31.89 kg/m2       Constitutional: Awake, alert, and in no acute distress. Neurological:1+ symmetrical DTRs in the lower extremities. Sensation to light touch is intact. Skin: warm, dry, and intact. Musculoskeletal: Minimal tenderness to palpation in the lower lumbar region. No pain with extension, axial loading, or forward flexion. No pain with internal or external rotation of his hips. Negative straight leg raise bilaterally. Patient ambulates with the assistance of a rolling walker.      Hip Flex  Quads Hamstrings Ankle DF EHL Ankle PF   Right +4/5 +4/5 +4/5 +4/5 +4/5 +4/5   Left +4/5 +4/5 +4/5 +4/5 +4/5 +4/5     IMAGING:    Lumbar spine x-rays from 11/10/2017 were personally reviewed with the patient and demonstrated:  FINDINGS:    AP, lateral, and bilateral oblique views of the lumbar spine are submitted for evaluation. There is no evidence of fracture or subluxation.      Mild levocurvature of the lumbar spine.  There is osteophytosis at all levels.  There is mild disc height narrowing at the most levels most noted at L1/2 and L2/3.  There are facet degenerative changes of the lower lumbar spine.  There are degenerative changes of the visualized SI joints and hips.  Osseous mineralization is radiographically decreased.  There are multiple surgical clips in the pelvis.     IMPRESSION:  1.  Osteopenia.  Multilevel degenerative changes.  No acute fracture. Written by [facility], as dictated by Klever Westfall MD.  I, Dr. Klever Westfall confirm that all documentation is accurate.

## 2018-01-23 NOTE — PATIENT INSTRUCTIONS

## 2018-01-24 ENCOUNTER — HOSPITAL ENCOUNTER (OUTPATIENT)
Dept: PHYSICAL THERAPY | Age: 83
Discharge: HOME | End: 2018-01-24
Payer: MEDICARE

## 2018-01-24 PROCEDURE — G8979 MOBILITY GOAL STATUS: HCPCS

## 2018-01-24 PROCEDURE — 97110 THERAPEUTIC EXERCISES: CPT

## 2018-01-24 PROCEDURE — G8978 MOBILITY CURRENT STATUS: HCPCS

## 2018-01-24 PROCEDURE — 97112 NEUROMUSCULAR REEDUCATION: CPT

## 2018-01-24 NOTE — PROGRESS NOTES
[facility]  [address]  [place], [address]  [phone] fax    Medicare Progress Report    Patient name: Gerhardt Bell Start of Care: 2017   Referral source: Mercy Jo MD : 3/11/1929                         Medical Diagnosis: Unsteadiness on feet [R26.81]  Other abnormalities of gait and mobility [R26.89] Onset Date:1 month ago Lumbar pain, 12 months ago balance                         Treatment Diagnosis: LBP/poor balance   Prior Hospitalization: see medical history Provider#: [id number]   Medications: Verified on Patient summary List    Comorbidities: Diabetes, HTN, hx prostate cancer with related surgery   Prior Level of Function: The patient states that he was able to ambulate void of RW and did not have falls prior to 1 year ago. Visits from Start of Care: 6    Missed Visits: 0    Reporting Period: 2017 to 2018    Subjective Reports: The patient states that he had a fall over the weekend. He indicates he was not using his walker in the home, and was walking with his wife to the restroom when she pulled on his arm (she has dementia) and he fell forward uninjured. Current Status/ treatment goals Objective measures   Short Term Goals: To be accomplished in 2 weeks:                        1. The patient will be independent and compliant with HEP to maximize therapeutic benefit. - Pt reports HEP compliance. 2018  Long Term Goals: To be accomplished in 4 weeks:                        2. The patient will improve FOTO score to 66 lumbar in order to better improve quality of life. - 71%. 2018                        2. The patient will display TUG of 20\" without AD in order to improve efficiency of home ambulation. - not met TUG 32 \". 18                        3. The patient will improve DGI to 14/24 in order to reduce falls risk. Met - 182018                        4.  The patient will demonstrate sit to stand 30\" chair rise test to 8 repetitions in order to maximize efficiency of transfers in the home. - Sit to stand from chair 7x in 30\". 2018; Met 8x 1/24/2018       Key functional changes:   DGI:   TU\"  9x sit to stand: 30\"        Problems/ barriers to goal attainment: None     Assessment / Recommendations: The patient has progressed significantly with regards to his balance per objective measures (DGI, sit to stands, TUG), despite recent reports of fall. The patient has not had pain in his lumbar spine since initiating PT. He has been compliant with HEP. Problem List: pain affecting function, decrease ROM, decrease strength, impaired gait/ balance, decrease ADL/ functional abilitiies, decrease activity tolerance and decrease flexibility/ joint mobility   Treatment Plan: Therapeutic exercise, Therapeutic activities, Neuromuscular re-education, Physical agent/modality, Gait/balance training, Manual therapy and Patient education    Patient Goal (s) has been updated and includes: Improve balance     Updated Goals to be accomplished in 2 weeks:  1. The patient will display independence with updated HEP. 2. The patient will demonstrate TUG to 18\" in order to maximize ease of transfers and negotiation of rooms in home. Frequency / Duration: Patient to be seen 2 times per week for 2 weeks:    G-Codes (GP)  Mobility  [id number] Current  CJ= 20-39%   Goal  CJ= 20-39%    The severity rating is based on clinical judgment and the FOTO score.       Gila Davila, PT 2018 1:15 PM

## 2018-01-24 NOTE — PROGRESS NOTES
PT DAILY TREATMENT NOTE - [facility]     Patient Name: Cedrick Nelson  Date:2018  : 3/11/1929  [x]  Patient  Verified  Payor: Kip Payor / Plan: [place] MED[place] & [place] / Product Type: Medicare /    In time: 11:32  Out time: 12:10  Total Treatment Time (min): 38  Total Timed Codes (min): 38  1:1 Treatment Time ( only): 38   Visit #: 6 of 8    Treatment Area: Unsteadiness on feet [R26.81]  Other abnormalities of gait and mobility [R26.89]    SUBJECTIVE  Pain Level (0-10 scale): 0/10  Any medication changes, allergies to medications, adverse drug reactions, diagnosis change, or new procedure performed?: [x] No    [] Yes (see summary sheet for update)  Subjective functional status/changes:   [] No changes reported  The patient states that he had a fall over the weekend. He indicates he was not using his walker in the home, and was walking with his wife to the restroom when she pulled on his arm (she has dementia) and he fell forward uninjured. OBJECTIVE  10 min Therapeutic Exercise:  [x] See flow sheet :   Rationale: increase ROM and increase strength to improve the patients ability to improve ADL ease. 28 min Neuromuscular Re-education:  [x]  See flow sheet :   Rationale: improve coordination, improve balance and increase proprioception  to improve the patients ability to improve ADL ease. With   [] TE   [] TA   [] neuro   [] other: Patient Education: [x] Review HEP    [] Progressed/Changed HEP based on:   [] positioning   [] body mechanics   [] transfers   [] heat/ice application    [] other:      Other Objective/Functional Measures:   DGI:   TU\"  9x sit to stand: 30\"     Pain Level (0-10 scale) post treatment: 0/10    ASSESSMENT/Changes in Function: The patient has progressed significantly with regards to his balance per objective measures (DGI, sit to stands, TUG), despite recent reports of fall. He has been compliant with HEP.     Patient will continue to benefit from skilled PT services to modify and progress therapeutic interventions, address functional mobility deficits, address ROM deficits, address strength deficits, analyze and address soft tissue restrictions, analyze and cue movement patterns, analyze and modify body mechanics/ergonomics, assess and modify postural abnormalities, address imbalance/dizziness and instruct in home and community integration to attain remaining goals. []  See Plan of Care  []  See progress note/recertification  []  See Discharge Summary         Progress towards goals / Updated goals:  Short Term Goals: To be accomplished in 2 weeks:                        7. The patient will be independent and compliant with HEP to maximize therapeutic benefit. - Pt reports HEP compliance. 1/2/2018  Long Term Goals: To be accomplished in 4 weeks:                        9. The patient will improve FOTO score to 66 lumbar in order to better improve quality of life. - 71%. 1/16/2018                        2. The patient will display TUG of 20\" without AD in order to improve efficiency of home ambulation. - not met TUG 32 \". 1/11/18                        3. The patient will improve DGI to 14/24 in order to reduce falls risk. Met - 18/24 1/24/2018                        4. The patient will demonstrate sit to stand 30\" chair rise test to 8 repetitions in order to maximize efficiency of transfers in the home. - Sit to stand from chair 7x in 30\". 1/16/2018;  Met 8x 1/24/2018    PLAN  []  Upgrade activities as tolerated     [x]  Continue plan of care  []  Update interventions per flow sheet       []  Discharge due to:_  []  Other:_      Nasir Leahy, PT 1/24/2018  12:15 PM    Future Appointments  Date Time Provider Kaleigh Rueda   1/25/2018 2:30 PM Edward Troy PTA [id number]

## 2018-01-25 ENCOUNTER — HOSPITAL ENCOUNTER (OUTPATIENT)
Dept: PHYSICAL THERAPY | Age: 83
Discharge: HOME | End: 2018-01-25
Payer: MEDICARE

## 2018-01-25 PROCEDURE — 97110 THERAPEUTIC EXERCISES: CPT

## 2018-01-25 PROCEDURE — 97116 GAIT TRAINING THERAPY: CPT

## 2018-01-25 NOTE — PROGRESS NOTES
PT DAILY TREATMENT NOTE - [facility]     Patient Name: Jasvir Akhtar  Date:2018  : 3/11/1929  [x]  Patient  Verified  Payor: Don Gottron / Plan: [place] MEDICARE PART A & B / Product Type: Medicare /    In time:2:30  Out time:3:00  Total Treatment Time (min): 30  Total Timed Codes (min): 30  1:1 Treatment Time ( [address] only): 30   Visit #: 1 of 4    Treatment Area: Unsteadiness on feet [R26.81]  Other abnormalities of gait and mobility [R26.89]    SUBJECTIVE  Pain Level (0-10 scale): 0/10  Any medication changes, allergies to medications, adverse drug reactions, diagnosis change, or new procedure performed?: [x] No    [] Yes (see summary sheet for update)  Subjective functional status/changes:   [] No changes reported  Pt reports no new complaints. OBJECTIVE    22 min Therapeutic Exercise:  [x] See flow sheet :   Rationale: increase ROM and increase strength to improve the patients ability to tolerate ADLs. 8 min Gait Training:  Dynamic gait activities with no  device on level surfaces with SBA level of assist   Rationale: With   [] TE   [] TA   [] neuro   [] other: Patient Education: [x] Review HEP    [] Progressed/Changed HEP based on:   [] positioning   [] body mechanics   [] transfers   [] heat/ice application    [] other:      Other Objective/Functional Measures: TUG 21 seconds. Pain Level (0-10 scale) post treatment: 0/10    ASSESSMENT/Changes in Function: Pt demonstrates improved efficiency with TUG but has not met current goal time of 18 seconds. Patient will continue to benefit from skilled PT services to modify and progress therapeutic interventions, address functional mobility deficits, address ROM deficits, address strength deficits, analyze and address soft tissue restrictions, analyze and cue movement patterns and analyze and modify body mechanics/ergonomics to attain remaining goals.      []  See Plan of Care  []  See progress note/recertification  []  See Discharge Summary         Progress towards goals / Updated goals:  1. The patient will display independence with updated HEP. 2. The patient will demonstrate TUG to 18\" in order to maximize ease of transfers and negotiation of rooms in home. - not met to performed in 21\". 1/25/18    PLAN  []  Upgrade activities as tolerated     [x]  Continue plan of care  []  Update interventions per flow sheet       []  Discharge due to:_  []  Other:_      Barbara Martinez, PTA 1/25/2018  2:42 PM    No future appointments.

## 2018-01-30 ENCOUNTER — HOSPITAL ENCOUNTER (OUTPATIENT)
Dept: PHYSICAL THERAPY | Age: 83
Discharge: HOME | End: 2018-01-30
Payer: MEDICARE

## 2018-01-30 PROCEDURE — 97112 NEUROMUSCULAR REEDUCATION: CPT

## 2018-01-30 PROCEDURE — 97110 THERAPEUTIC EXERCISES: CPT

## 2018-01-30 NOTE — PROGRESS NOTES
PT DAILY TREATMENT NOTE - [facility]     Patient Name: Juan Miguel Prabhakar  Date:2018  : 3/11/1929  [x]  Patient  Verified  Payor: Nova Mendoza / Plan: [place] MEDICARE PART A & B / Product Type: Medicare /    In time:12:00  Out time:12:42  Total Treatment Time (min): 42  Total Timed Codes (min): 42  1:1 Treatment Time ( only): 42   Visit #: 2 of 4    Treatment Area: Unsteadiness on feet [R26.81]  Other abnormalities of gait and mobility [R26.89]    SUBJECTIVE  Pain Level (0-10 scale): 0/10  Any medication changes, allergies to medications, adverse drug reactions, diagnosis change, or new procedure performed?: [x] No    [] Yes (see summary sheet for update)  Subjective functional status/changes:   [] No changes reported  The patient states that he feels good upon arrival. Denies recent LOB or falls. OBJECTIVE  10 min Therapeutic Exercise:  [x] See flow sheet :   Rationale: increase ROM and increase strength to improve the patients ability to improve ADL ease. 32 min Neuromuscular Re-education:  [x]  See flow sheet :   Rationale: improve coordination, improve balance and increase proprioception  to improve the patients ability to improve ADL ease. With   [] TE   [] TA   [] neuro   [] other: Patient Education: [x] Review HEP    [] Progressed/Changed HEP based on:   [] positioning   [] body mechanics   [] transfers   [] heat/ice application    [] other:      Other Objective/Functional Measures:   Good dynamic balance appreciated during obstacle course. The patient able to grasp items from floor without experiencing LOB. Pain Level (0-10 scale) post treatment: 0/10    ASSESSMENT/Changes in Function: Progressing fairly well with regards to dynamic balance.     Patient will continue to benefit from skilled PT services to modify and progress therapeutic interventions, address functional mobility deficits, address ROM deficits, address strength deficits, analyze and address soft tissue restrictions, analyze and cue movement patterns, analyze and modify body mechanics/ergonomics, assess and modify postural abnormalities, address imbalance/dizziness and instruct in home and community integration to attain remaining goals. []  See Plan of Care  []  See progress note/recertification  []  See Discharge Summary         Progress towards goals / Updated goals:  Progress towards goals / Updated goals:  1. The patient will display independence with updated HEP. 2. The patient will demonstrate TUG to 18\" in order to maximize ease of transfers and negotiation of rooms in home. - not met to performed in 21\".  1/25/18     PLAN  []  Upgrade activities as tolerated     [x]  Continue plan of care  []  Update interventions per flow sheet       []  Discharge due to:_  []  Other:_      Yuik Collazo, PT 1/30/2018  1:44 PM    Future Appointments  Date Time Provider Kaleigh Rueda   2/1/2018 12:00 PM [facility]   2/6/2018 10:30 AM Stoney Barney [facility]

## 2018-02-01 ENCOUNTER — HOSPITAL ENCOUNTER (OUTPATIENT)
Dept: PHYSICAL THERAPY | Age: 83
Discharge: HOME | End: 2018-02-01
Payer: MEDICARE

## 2018-02-01 PROCEDURE — 97116 GAIT TRAINING THERAPY: CPT

## 2018-02-01 PROCEDURE — G8979 MOBILITY GOAL STATUS: HCPCS

## 2018-02-01 PROCEDURE — G8980 MOBILITY D/C STATUS: HCPCS

## 2018-02-01 PROCEDURE — 97110 THERAPEUTIC EXERCISES: CPT

## 2018-02-01 NOTE — PROGRESS NOTES
PT DAILY TREATMENT NOTE - [facility]     Patient Name: Gerhardt Bell  Date:2018  : 3/11/1929  [x]  Patient  Verified  Payor: VA MEDICARE / Plan: [place] MEDICARE PART A & B / Product Type: Medicare /    In time:12:00  Out time:12:26  Total Treatment Time (min): 26  Total Timed Codes (min): 26  1:1 Treatment Time ( [address] only): 26   Visit #: 3 of 4    Treatment Area: Unsteadiness on feet [R26.81]  Other abnormalities of gait and mobility [R26.89]    SUBJECTIVE  Pain Level (0-10 scale): 0/10  Any medication changes, allergies to medications, adverse drug reactions, diagnosis change, or new procedure performed?: [x] No    [] Yes (see summary sheet for update)  Subjective functional status/changes:   [] No changes reported  Pt reports no new complaints. Pt reports continued compliance with HEP. OBJECTIVE    16 min Therapeutic Exercise:  [x] See flow sheet :   Rationale: increase ROM and increase strength to improve the patients ability to tolerate ADLs. 10 min Gait Training:  TUG, Dynamic gait activities per flow sheet. No  device on level surfaces with SBA level of assist   Rationale: With   [] TE   [] TA   [] neuro   [] other: Patient Education: [x] Review HEP    [] Progressed/Changed HEP based on:   [] positioning   [] body mechanics   [] transfers   [] heat/ice application    [] other:      Other Objective/Functional Measures: No LOB with gait activities. Pain Level (0-10 scale) post treatment: 0/10    ASSESSMENT/Changes in Function: Pt demonstrates improved functional mobility and safe ambulation when performing gait training. Patient will continue to benefit from skilled PT services to modify and progress therapeutic interventions, address functional mobility deficits, address ROM deficits, address strength deficits, analyze and address soft tissue restrictions, analyze and cue movement patterns and analyze and modify body mechanics/ergonomics to attain remaining goals.      [] See Plan of Care  []  See progress note/recertification  []  See Discharge Summary         Progress towards goals / Updated goals:  1. The patient will display independence with updated HEP. 2. The patient will demonstrate TUG to 18\" in order to maximize ease of transfers and negotiation of rooms in home. - not met to performed in 21\".  1/25/18    PLAN  []  Upgrade activities as tolerated     [x]  Continue plan of care  []  Update interventions per flow sheet       []  Discharge due to:_  []  Other:_      Bernadette Patino PTA 2/1/2018  12:29 PM    Future Appointments  Date Time Provider Kaleigh Rueda   2/6/2018 10:30 AM [id number] [facility]

## 2018-02-06 ENCOUNTER — APPOINTMENT (OUTPATIENT)
Dept: PHYSICAL THERAPY | Age: 83
End: 2018-02-06
Payer: MEDICARE

## 2018-02-07 NOTE — PROGRESS NOTES
[facility]  [address]  [place], [address]  [phone] fax    Physical Therapy Discharge Summary    Patient name: Jaime Freire Start of Care: 2017   Referral source: Laura Snider MD : 3/11/1929                         Medical Diagnosis: Unsteadiness on feet [R26.81]  Other abnormalities of gait and mobility [R26.89] Onset Date:1 month ago Lumbar pain, 12 months ago balance                         Treatment Diagnosis: LBP/poor balance   Prior Hospitalization: see medical history Provider#: [id number]   Medications: Verified on Patient summary List    Comorbidities: Diabetes, HTN, hx prostate cancer with related surgery   Prior Level of Function: The patient states that he was able to ambulate void of RW and did not have falls prior to 1 year ago. Visits from Start of Care: 9    Missed Visits: 0  Reporting Period : 2018 to 2018    Summary of Care:  Progress towards goals / Updated goals:  1. The patient will display independence with updated HEP. 2. The patient will demonstrate TUG to 18\" in order to maximize ease of transfers and negotiation of rooms in home. - not met to performed in 21\". 18    The patient made excellent progress towards goals, but self-discharged prior to completing final visits. G-Codes (GP)  Mobility   [id number] Goal  CJ= 20-39%  D/C  CJ= 20-39%     The severity rating is based on clinical judgment and the FOTO score. ASSESSMENT/RECOMMENDATIONS: The patient made excellent progress towards goals. Unable to issue updated HEP due to self discharge.     [x]Discontinue therapy: [x]Patient has reached or is progressing toward set goals      []Patient is non-compliant or has abdicated      []Due to lack of appreciable progress towards set [address], PT 2018 8:32 AM

## 2019-03-13 ENCOUNTER — HOSPITAL ENCOUNTER (INPATIENT)
Age: 84
LOS: 8 days | Discharge: NURSING, REHAB OR LONG-TERM CARE | DRG: 378 | End: 2019-03-21
Attending: EMERGENCY MEDICINE | Admitting: INTERNAL MEDICINE
Payer: MEDICARE

## 2019-03-13 DIAGNOSIS — K92.1 GASTROINTESTINAL HEMORRHAGE WITH MELENA: Primary | ICD-10-CM

## 2019-03-13 PROBLEM — K92.2 GI BLEED: Status: ACTIVE | Noted: 2019-03-13

## 2019-03-13 LAB
ANION GAP SERPL CALC-SCNC: 5 MMOL/L (ref 3–18)
BASOPHILS # BLD: 0 K/UL (ref 0–0.1)
BASOPHILS # BLD: 0 K/UL (ref 0–0.1)
BASOPHILS NFR BLD: 0 % (ref 0–2)
BASOPHILS NFR BLD: 1 % (ref 0–2)
BUN SERPL-MCNC: 21 MG/DL (ref 7–18)
BUN/CREAT SERPL: 16 (ref 12–20)
CALCIUM SERPL-MCNC: 9 MG/DL (ref 8.5–10.1)
CHLORIDE SERPL-SCNC: 112 MMOL/L (ref 100–108)
CO2 SERPL-SCNC: 28 MMOL/L (ref 21–32)
CREAT SERPL-MCNC: 1.32 MG/DL (ref 0.6–1.3)
DIFFERENTIAL METHOD BLD: ABNORMAL
DIFFERENTIAL METHOD BLD: ABNORMAL
EOSINOPHIL # BLD: 0.3 K/UL (ref 0–0.4)
EOSINOPHIL # BLD: 0.3 K/UL (ref 0–0.4)
EOSINOPHIL NFR BLD: 3 % (ref 0–5)
EOSINOPHIL NFR BLD: 4 % (ref 0–5)
ERYTHROCYTE [DISTWIDTH] IN BLOOD BY AUTOMATED COUNT: 13.2 % (ref 11.6–14.5)
ERYTHROCYTE [DISTWIDTH] IN BLOOD BY AUTOMATED COUNT: 13.5 % (ref 11.6–14.5)
GLUCOSE SERPL-MCNC: 133 MG/DL (ref 74–99)
HCT VFR BLD AUTO: 30.1 % (ref 36–48)
HCT VFR BLD AUTO: 30.3 % (ref 36–48)
HGB BLD-MCNC: 9.4 G/DL (ref 13–16)
HGB BLD-MCNC: 9.7 G/DL (ref 13–16)
LYMPHOCYTES # BLD: 1 K/UL (ref 0.9–3.6)
LYMPHOCYTES # BLD: 1.1 K/UL (ref 0.9–3.6)
LYMPHOCYTES NFR BLD: 11 % (ref 21–52)
LYMPHOCYTES NFR BLD: 15 % (ref 21–52)
MAGNESIUM SERPL-MCNC: 2.4 MG/DL (ref 1.6–2.6)
MCH RBC QN AUTO: 29.7 PG (ref 24–34)
MCH RBC QN AUTO: 30.1 PG (ref 24–34)
MCHC RBC AUTO-ENTMCNC: 31 G/DL (ref 31–37)
MCHC RBC AUTO-ENTMCNC: 32.2 G/DL (ref 31–37)
MCV RBC AUTO: 93.5 FL (ref 74–97)
MCV RBC AUTO: 95.6 FL (ref 74–97)
MONOCYTES # BLD: 0.8 K/UL (ref 0.05–1.2)
MONOCYTES # BLD: 0.9 K/UL (ref 0.05–1.2)
MONOCYTES NFR BLD: 10 % (ref 3–10)
MONOCYTES NFR BLD: 11 % (ref 3–10)
NEUTS SEG # BLD: 5.4 K/UL (ref 1.8–8)
NEUTS SEG # BLD: 6.6 K/UL (ref 1.8–8)
NEUTS SEG NFR BLD: 70 % (ref 40–73)
NEUTS SEG NFR BLD: 75 % (ref 40–73)
PLATELET # BLD AUTO: 202 K/UL (ref 135–420)
PLATELET # BLD AUTO: 211 K/UL (ref 135–420)
PMV BLD AUTO: 10.4 FL (ref 9.2–11.8)
PMV BLD AUTO: 11.4 FL (ref 9.2–11.8)
POTASSIUM SERPL-SCNC: 4.6 MMOL/L (ref 3.5–5.5)
RBC # BLD AUTO: 3.17 M/UL (ref 4.7–5.5)
RBC # BLD AUTO: 3.22 M/UL (ref 4.7–5.5)
SODIUM SERPL-SCNC: 145 MMOL/L (ref 136–145)
WBC # BLD AUTO: 7.7 K/UL (ref 4.6–13.2)
WBC # BLD AUTO: 8.8 K/UL (ref 4.6–13.2)

## 2019-03-13 PROCEDURE — 65660000000 HC RM CCU STEPDOWN

## 2019-03-13 PROCEDURE — 99285 EMERGENCY DEPT VISIT HI MDM: CPT

## 2019-03-13 PROCEDURE — 77030010545

## 2019-03-13 PROCEDURE — 74011250636 HC RX REV CODE- 250/636: Performed by: EMERGENCY MEDICINE

## 2019-03-13 PROCEDURE — 80048 BASIC METABOLIC PNL TOTAL CA: CPT

## 2019-03-13 PROCEDURE — 74011000250 HC RX REV CODE- 250: Performed by: INTERNAL MEDICINE

## 2019-03-13 PROCEDURE — C9113 INJ PANTOPRAZOLE SODIUM, VIA: HCPCS | Performed by: INTERNAL MEDICINE

## 2019-03-13 PROCEDURE — 85025 COMPLETE CBC W/AUTO DIFF WBC: CPT

## 2019-03-13 PROCEDURE — 74011250636 HC RX REV CODE- 250/636: Performed by: INTERNAL MEDICINE

## 2019-03-13 PROCEDURE — 36415 COLL VENOUS BLD VENIPUNCTURE: CPT

## 2019-03-13 PROCEDURE — 83735 ASSAY OF MAGNESIUM: CPT

## 2019-03-13 RX ORDER — SODIUM CHLORIDE 9 MG/ML
125 INJECTION, SOLUTION INTRAVENOUS CONTINUOUS
Status: DISCONTINUED | OUTPATIENT
Start: 2019-03-13 | End: 2019-03-13

## 2019-03-13 RX ORDER — SODIUM CHLORIDE 0.9 % (FLUSH) 0.9 %
5-40 SYRINGE (ML) INJECTION AS NEEDED
Status: DISCONTINUED | OUTPATIENT
Start: 2019-03-13 | End: 2019-03-21 | Stop reason: HOSPADM

## 2019-03-13 RX ORDER — SIMVASTATIN 40 MG/1
80 TABLET, FILM COATED ORAL DAILY
Status: DISCONTINUED | OUTPATIENT
Start: 2019-03-14 | End: 2019-03-21 | Stop reason: HOSPADM

## 2019-03-13 RX ORDER — RIVASTIGMINE TARTRATE 1.5 MG/1
1.5 CAPSULE ORAL 2 TIMES DAILY WITH MEALS
Status: DISCONTINUED | OUTPATIENT
Start: 2019-03-14 | End: 2019-03-14

## 2019-03-13 RX ORDER — SODIUM CHLORIDE 0.9 % (FLUSH) 0.9 %
5-40 SYRINGE (ML) INJECTION EVERY 8 HOURS
Status: DISCONTINUED | OUTPATIENT
Start: 2019-03-13 | End: 2019-03-21 | Stop reason: HOSPADM

## 2019-03-13 RX ADMIN — SODIUM CHLORIDE 125 ML/HR: 900 INJECTION, SOLUTION INTRAVENOUS at 13:13

## 2019-03-13 RX ADMIN — Medication 10 ML: at 21:26

## 2019-03-13 RX ADMIN — SODIUM CHLORIDE 40 MG: 9 INJECTION INTRAMUSCULAR; INTRAVENOUS; SUBCUTANEOUS at 21:26

## 2019-03-13 NOTE — ED PROVIDER NOTES
EMERGENCY DEPARTMENT HISTORY AND PHYSICAL EXAM    10:06 AM      Date: 3/13/2019  Patient Name: Jenifer Leiva    History of Presenting Illness     Chief Complaint   Patient presents with    Rectal Bleeding       History Provided By: Patient    Additional History (Context): Jenifer Leiva is a 80 y.o. male with HLD, CAD, DM, prostate cancer who presents via EMS from [facility] with c/o blood in stool approximately 30 minutes PTA. Patient denies any pain and says has had blood in past but was a smaller amount and was not seen for issue. No other concerns or symptoms at this time. PCP: Jacy León MD      Current Outpatient Medications   Medication Sig Dispense Refill    montelukast (SINGULAIR) 10 mg tablet TAKE ONE TABLET BY MOUTH EVERY NIGHT AT BEDTIME      rivastigmine tartrate (EXELON) 1.5 mg capsule TAKE ONE CAPSULE BY MOUTH TWICE DAILY WITH MEALS      SITagliptin (JANUVIA) 100 mg tablet TAKE 1 TABLET BY MOUTH ONCE DAILY      HYDROcodone-acetaminophen (NORCO) 5-325 mg per tablet Take 1 Tab by Mouth Every 6 Hours As Needed for Pain.  FERROUS SULFATE (FEOSOL PO) Take  by mouth two (2) times a day.  cloNIDine (CATAPRES-TTS-2) 0.2 mg/24 hr patch 1 Patch by TransDERmal route every seven (7) days.  simvastatin (ZOCOR) 80 mg tablet Take 80 mg by mouth daily.  ramipril (ALTACE) 5 mg capsule Take  by mouth daily.  metoprolol-XL (TOPROL XL) 50 mg XL tablet Take  by mouth daily.  valsartan-hydrochlorothiazide (DIOVAN HCT) 160-12.5 mg per tablet Take 1 Tab by mouth daily.  amlodipine (NORVASC) 5 mg tablet Take 5 mg by mouth daily.  pioglitazone (ACTOS) 45 mg tablet Take  by mouth daily.  alfuzosin SR (UROXATRAL) 10 mg SR tablet Take  by mouth nightly as needed.  fexofenadine (ALLEGRA) 60 mg tablet Take 60 mg by mouth two (2) times a day.  aspirin (ASPIRIN) 325 mg tablet Take 81 mg by mouth daily.          Past History     Past Medical History:  Past Medical History:   Diagnosis Date    Arthritis     [facility] [address]     Coronary atherosclerosis of native coronary artery     AS DESCRIBED IN CATH CARDIAC CATH IN 9/07 SHOWS 80% DISTAL LAD AND 40% PROXIMAL LAD DISEASE . HE IS ON MEDICAL TREATMENT. ASA AND B-BLOCKER 3/10 RENAL ARTERY DUPLEX : NL RT RENAL ARTERY WITH < 60% STENOSIS OF LEFT RENAL ARTERY    Diabetes ([facility] [address]     Glaucoma     Hypertension     Impotence of organic origin     Malignant neoplasm of prostate ([facility] [address]     Other and unspecified hyperlipidemia        Past Surgical History:  History reviewed. No pertinent surgical history. Family History:  Family History   Problem Relation Age of Onset    Diabetes Other     Heart Disease Other        Social History:  Social History     Tobacco Use    Smoking status: Never Smoker    Smokeless tobacco: Never Used   Substance Use Topics    Alcohol use: No    Drug use: Not on file       Allergies:  No Known Allergies      Review of Systems       Review of Systems   Constitutional: Negative for activity change, fatigue and fever. HENT: Negative for congestion and rhinorrhea. Eyes: Negative for visual disturbance. Respiratory: Negative for shortness of breath. Cardiovascular: Negative for chest pain and palpitations. Gastrointestinal: Positive for blood in stool. Negative for abdominal pain, diarrhea, nausea and vomiting. Genitourinary: Negative for dysuria and hematuria. Musculoskeletal: Negative for back pain. Skin: Negative for rash. Neurological: Negative for dizziness, weakness and light-headedness. Psychiatric/Behavioral: Negative for agitation. All other systems reviewed and are negative. Physical Exam     Visit Vitals  /58   Pulse (!) 52   Temp 97.5 °F (36.4 °C)   Resp 17   Wt 95.3 kg (210 lb)   SpO2 96%   BMI 37.20 kg/m²         Physical Exam   Constitutional: He is oriented to person, place, and time. He appears well-developed and well-nourished. HENT:   Head: Normocephalic and atraumatic. Mouth/Throat: Oropharynx is clear and moist.   Eyes: Conjunctivae are normal.   Neck: Normal range of motion. Neck supple. No JVD present. Cardiovascular: Normal rate, regular rhythm, normal heart sounds and intact distal pulses. No murmur heard. Pulmonary/Chest: Effort normal and breath sounds normal.   Abdominal: Soft. Bowel sounds are normal. He exhibits no distension. There is no tenderness. Genitourinary: Rectal exam shows guaiac positive stool. Genitourinary Comments: Melanotic stool in rectal vault. Musculoskeletal: Normal range of motion. He exhibits no deformity. Lymphadenopathy:     He has no cervical adenopathy. Neurological: He is alert and oriented to person, place, and time. Coordination normal.   Skin: Skin is warm and dry. No rash noted. Psychiatric: He has a normal mood and affect. Nursing note and vitals reviewed.         Diagnostic Study Results     Labs -  Recent Results (from the past 12 hour(s))   METABOLIC PANEL, BASIC    Collection Time: 03/13/19 10:35 AM   Result Value Ref Range    Sodium 145 136 - 145 mmol/L    Potassium 4.6 3.5 - 5.5 mmol/L    Chloride 112 (H) 100 - 108 mmol/L    CO2 28 21 - 32 mmol/L    Anion gap 5 3.0 - 18 mmol/L    Glucose 133 (H) 74 - 99 mg/dL    BUN 21 (H) 7.0 - 18 MG/DL    Creatinine 1.32 (H) 0.6 - 1.3 MG/DL    BUN/Creatinine ratio 16 12 - 20      GFR est AA >60 >60 ml/min/1.73m2    GFR est non-AA 51 (L) >60 ml/min/1.73m2    Calcium 9.0 8.5 - 10.1 MG/DL   CBC WITH AUTOMATED DIFF    Collection Time: 03/13/19 10:35 AM   Result Value Ref Range    WBC 8.8 4.6 - 13.2 K/uL    RBC 3.17 (L) 4.70 - 5.50 M/uL    HGB 9.4 (L) 13.0 - 16.0 g/dL    HCT 30.3 (L) 36.0 - 48.0 %    MCV 95.6 74.0 - 97.0 FL    MCH 29.7 24.0 - 34.0 PG    MCHC 31.0 31.0 - 37.0 g/dL    RDW 13.2 11.6 - 14.5 %    PLATELET 278 530 - 895 K/uL    MPV 11.4 9.2 - 11.8 FL    NEUTROPHILS 75 (H) 40 - 73 %    LYMPHOCYTES 11 (L) 21 - 52 %    MONOCYTES 10 3 - 10 %    EOSINOPHILS 3 0 - 5 %    BASOPHILS 1 0 - 2 %    ABS. NEUTROPHILS 6.6 1.8 - 8.0 K/UL    ABS. LYMPHOCYTES 1.0 0.9 - 3.6 K/UL    ABS. MONOCYTES 0.9 0.05 - 1.2 K/UL    ABS. EOSINOPHILS 0.3 0.0 - 0.4 K/UL    ABS. BASOPHILS 0.0 0.0 - 0.1 K/UL    DF AUTOMATED     MAGNESIUM    Collection Time: 03/13/19 10:35 AM   Result Value Ref Range    Magnesium 2.4 1.6 - 2.6 mg/dL       Radiologic Studies -   No orders to display         Medical Decision Making     It should be noted that I, Natalia Hunt MD will be the provider of record for this patient. I reviewed the vital signs, available nursing notes, past medical history, past surgical history, family history and social history. Vital Signs-Reviewed the patient's vital signs. Pulse Oximetry Analysis -  98% on room air (Interpretation WNL)    Cardiac Monitor:  Rate: 59 BPM    Records Reviewed: Nursing Notes and Old Medical Records (Time of Review: 10:06 AM)    ED Course: Progress Notes, Reevaluation, and Consults:    12:46 PM: Consult: Discussed care with Dr. Jaimee Jean, Specialty: hospitalist. Standard discussion; including history of patients chief complaint, available diagnostic results, and treatment course. Accepts admit. Provider Notes (Medical Decision Making): Pt has a GI bleed with melena, likely upper GI bleed. Is on po ASA daily due to prior CVA hx. On no other NSAIDS or anticoagulants. Clinically stable at this time. Will admit to [facility] with GI consult for further eval.  Alexandr Lara MD  12:54 PM      For Hospitalized Patients:    1. Hospitalization Decision Time:  The decision to hospitalize the patient was made by Dr. Samy Lui at 12:30 PM on 3/13/2019    2. Aspirin: Aspirin was not given because the patient is a bleeding risk    Diagnosis     Clinical Impression: No diagnosis found.     Disposition: Admit    Follow-up Information    None             Medication List      ASK your doctor about these medications    ACTOS 45 mg tablet  Generic drug:  pioglitazone     ALLEGRA 60 mg tablet  Generic drug:  fexofenadine     ALTACE 5 mg capsule  Generic drug:  ramipril     aspirin 325 mg tablet  Commonly known as:  ASPIRIN     CATAPRES-TTS-2 0.2 mg/24 hr Ptwk  Generic drug:  cloNIDine     DIOVAN -12.5 mg per tablet  Generic drug:  valsartan-hydroCHLOROthiazide     FEOSOL PO     HYDROcodone-acetaminophen 5-325 mg per tablet  Commonly known as:  [place] 100 mg tablet  Generic drug:  SITagliptin     montelukast 10 mg tablet  Commonly known as:  SINGULAIR     NORVASC 5 mg tablet  Generic drug:  amLODIPine     rivastigmine tartrate 1.5 mg capsule  Commonly known as:  EXELON     simvastatin 80 mg tablet  Commonly known as:  ZOCOR     TOPROL XL 50 mg XL tablet  Generic drug:  metoprolol succinate     UROXATRAL 10 mg SR tablet  Generic drug:  alfuzosin SR          _______________________________       Scribe Attestation     Davied Lesches acting as a scribe for and in the presence of Natalia Hunt MD      March 13, 2019 at 10:06 AM       Provider Attestation:      I personally performed the services described in the documentation, reviewed the documentation, as recorded by the scribe in my presence, and it accurately and completely records my words and actions.  March 13, 2019 at 10:06 AM - Natalia Hunt MD      _______________________________

## 2019-03-13 NOTE — ROUTINE PROCESS
Bedside shift change report given to Jayla Odonnell (oncoming nurse) by Yamilex Segura RN (offgoing nurse).  Report included the following information SBAR, Kardex, Intake/Output, Recent Results and Cardiac Rhythm SB.

## 2019-03-13 NOTE — ED TRIAGE NOTES
Patient brought by EMS from [address] [place] [address] for GI bleed. Nursing home reported that patient had BM this morning and there was a large amount of blood in toilet.

## 2019-03-13 NOTE — ROUTINE PROCESS
TRANSFER - OUT REPORT:    Verbal report given to Matthew Abrams (name) on Rivera Antony  being transferred to  [place] [id number] (unit) for routine progression of care       Report consisted of patients Situation, Background, Assessment and   Recommendations(SBAR). Information from the following report(s) SBAR and ED Summary was reviewed with the receiving nurse. Lines:   Peripheral IV 03/13/19 Right Antecubital (Active)   Site Assessment Clean, dry, & intact 3/13/2019  6:18 PM   Phlebitis Assessment 0 3/13/2019  6:18 PM   Infiltration Assessment 0 3/13/2019  6:18 PM   Dressing Status Clean, dry, & intact 3/13/2019  6:18 PM        Opportunity for questions and clarification was provided.

## 2019-03-13 NOTE — PROGRESS NOTES
GASTROENTEROLOGY BRIEF NOTE  Telephone consultation with Dr Sondra Favre, chart reviewed. 80year old male noted to have melena during hygiene care at nursing facility. ED confirmed guaiac positive stool. Per chart review, patient on daily aspirin due to CVA history; no other NSAIDs or anticoagulant. Clinically stable (H/H 9.4, 30.3) not requiring blood products. Patient will be evaluated upon admission at [facility] with detailed plan. Will be available as needed. Mariah Greenfield  [facility].  [email or web address]  Phone: [phone]

## 2019-03-13 NOTE — ED NOTES
Patient stated it felt as if he had \"something\" coming out of rectum. Checked patient, there was a small amount of blood when I wiped his buttock. There was no stool. Clean adult diaper placed on patient and patient repositioned in the bed. Son at bedside. Awaiting bed assignment.

## 2019-03-14 ENCOUNTER — ANESTHESIA EVENT (OUTPATIENT)
Dept: ENDOSCOPY | Age: 84
DRG: 378 | End: 2019-03-14
Payer: MEDICARE

## 2019-03-14 LAB
BASOPHILS # BLD: 0 K/UL (ref 0–0.1)
BASOPHILS NFR BLD: 1 % (ref 0–2)
DIFFERENTIAL METHOD BLD: ABNORMAL
EOSINOPHIL # BLD: 0.3 K/UL (ref 0–0.4)
EOSINOPHIL NFR BLD: 3 % (ref 0–5)
ERYTHROCYTE [DISTWIDTH] IN BLOOD BY AUTOMATED COUNT: 13.7 % (ref 11.6–14.5)
GLUCOSE BLD STRIP.AUTO-MCNC: 155 MG/DL (ref 70–110)
HCT VFR BLD AUTO: 24.9 % (ref 36–48)
HCT VFR BLD AUTO: 24.9 % (ref 36–48)
HCT VFR BLD AUTO: 28.7 % (ref 36–48)
HGB BLD-MCNC: 7.9 G/DL (ref 13–16)
HGB BLD-MCNC: 7.9 G/DL (ref 13–16)
HGB BLD-MCNC: 9 G/DL (ref 13–16)
LYMPHOCYTES # BLD: 1.1 K/UL (ref 0.9–3.6)
LYMPHOCYTES NFR BLD: 13 % (ref 21–52)
MCH RBC QN AUTO: 29.1 PG (ref 24–34)
MCHC RBC AUTO-ENTMCNC: 31.4 G/DL (ref 31–37)
MCV RBC AUTO: 92.9 FL (ref 74–97)
MONOCYTES # BLD: 1 K/UL (ref 0.05–1.2)
MONOCYTES NFR BLD: 11 % (ref 3–10)
NEUTS SEG # BLD: 6.3 K/UL (ref 1.8–8)
NEUTS SEG NFR BLD: 72 % (ref 40–73)
PLATELET # BLD AUTO: 228 K/UL (ref 135–420)
PMV BLD AUTO: 11 FL (ref 9.2–11.8)
RBC # BLD AUTO: 3.09 M/UL (ref 4.7–5.5)
WBC # BLD AUTO: 8.7 K/UL (ref 4.6–13.2)

## 2019-03-14 PROCEDURE — 82962 GLUCOSE BLOOD TEST: CPT

## 2019-03-14 PROCEDURE — 74011250637 HC RX REV CODE- 250/637: Performed by: INTERNAL MEDICINE

## 2019-03-14 PROCEDURE — 36415 COLL VENOUS BLD VENIPUNCTURE: CPT

## 2019-03-14 PROCEDURE — 74011250637 HC RX REV CODE- 250/637: Performed by: EMERGENCY MEDICINE

## 2019-03-14 PROCEDURE — 74011000250 HC RX REV CODE- 250: Performed by: INTERNAL MEDICINE

## 2019-03-14 PROCEDURE — 74011000250 HC RX REV CODE- 250: Performed by: PHYSICIAN ASSISTANT

## 2019-03-14 PROCEDURE — 74011250636 HC RX REV CODE- 250/636: Performed by: INTERNAL MEDICINE

## 2019-03-14 PROCEDURE — 65660000000 HC RM CCU STEPDOWN

## 2019-03-14 PROCEDURE — 85025 COMPLETE CBC W/AUTO DIFF WBC: CPT

## 2019-03-14 PROCEDURE — 85018 HEMOGLOBIN: CPT

## 2019-03-14 PROCEDURE — C9113 INJ PANTOPRAZOLE SODIUM, VIA: HCPCS | Performed by: INTERNAL MEDICINE

## 2019-03-14 RX ORDER — RIVASTIGMINE TARTRATE 1.5 MG/1
1.5 CAPSULE ORAL 2 TIMES DAILY WITH MEALS
Status: DISCONTINUED | OUTPATIENT
Start: 2019-03-14 | End: 2019-03-14

## 2019-03-14 RX ORDER — RIVASTIGMINE TARTRATE 1.5 MG/1
1.5 CAPSULE ORAL 2 TIMES DAILY WITH MEALS
Status: DISCONTINUED | OUTPATIENT
Start: 2019-03-14 | End: 2019-03-18 | Stop reason: SDUPTHER

## 2019-03-14 RX ADMIN — SODIUM CHLORIDE 40 MG: 9 INJECTION INTRAMUSCULAR; INTRAVENOUS; SUBCUTANEOUS at 11:32

## 2019-03-14 RX ADMIN — Medication 10 ML: at 20:26

## 2019-03-14 RX ADMIN — RIVASTIGMINE TARTRATE 1.5 MG: 1.5 CAPSULE ORAL at 18:01

## 2019-03-14 RX ADMIN — POLYETHYLENE GLYCOL 3350, SODIUM SULFATE ANHYDROUS, SODIUM BICARBONATE, SODIUM CHLORIDE, POTASSIUM CHLORIDE 2000 ML: 236; 22.74; 6.74; 5.86; 2.97 POWDER, FOR SOLUTION ORAL at 20:11

## 2019-03-14 RX ADMIN — SODIUM CHLORIDE 40 MG: 9 INJECTION INTRAMUSCULAR; INTRAVENOUS; SUBCUTANEOUS at 20:24

## 2019-03-14 RX ADMIN — SIMVASTATIN 80 MG: 40 TABLET, FILM COATED ORAL at 11:31

## 2019-03-14 RX ADMIN — RIVASTIGMINE TARTRATE 1.5 MG: 1.5 CAPSULE ORAL at 13:15

## 2019-03-14 NOTE — PROGRESS NOTES
[facility]  Progress Note    Patient: Kristen Berrios Age: 80 y.o. : 3/11/1929 MR#: [id number] SSN: [id number]  Date/Time: 3/14/2019    Subjective:     I personally saw and evaluated this patient on 19. Patient lying in bed in NAD, awake, follows commands, denies any abdominal pain, denies nausea, denies any current bleeding    Assessment/Plan:     -GI Bleed, ?diverticular  - acute blood loss anemia   - HTN  - DM2  - Dementia  - h/o Prostate Cancer    PLAN  GI following, planning colonoscopy  Monitor H&H  Monitor BP  D/w patient  Tried to call daughter, no response    Case discussed with:  []Patient  []Family  []Nursing  []Case Management  DVT Prophylaxis:  []Lovenox  []Hep SQ  []SCDs  []Coumadin   []On Heparin gtt    Objective:   VS:   Visit Vitals  /66 (BP 1 Location: Right arm, BP Patient Position: At rest)   Pulse 62   Temp 96.7 °F (35.9 °C)   Resp 22   Wt 95.8 kg (211 lb 1.6 oz)   SpO2 98%   BMI 37.39 kg/m²      Tmax/24hrs: Temp (24hrs), Av °F (36.1 °C), Min:95 °F (35 °C), Max:98.2 °F (36.8 °C)    Input/Output: No intake or output data in the 24 hours ending 19 1221    General:  Awake, follows commands  Cardiovascular:  S1S2+, RRR  Pulmonary:  CTA b/l  GI:  Soft, BS+, NT, ND  Extremities:  trace edema      Labs:    Recent Results (from the past 24 hour(s))   CBC WITH AUTOMATED DIFF    Collection Time: 19  8:05 PM   Result Value Ref Range    WBC 7.7 4.6 - 13.2 K/uL    RBC 3.22 (L) 4.70 - 5.50 M/uL    HGB 9.7 (L) 13.0 - 16.0 g/dL    HCT 30.1 (L) 36.0 - 48.0 %    MCV 93.5 74.0 - 97.0 FL    MCH 30.1 24.0 - 34.0 PG    MCHC 32.2 31.0 - 37.0 g/dL    RDW 13.5 11.6 - 14.5 %    PLATELET 699 195 - 035 K/uL    MPV 10.4 9.2 - 11.8 FL    NEUTROPHILS 70 40 - 73 %    LYMPHOCYTES 15 (L) 21 - 52 %    MONOCYTES 11 (H) 3 - 10 %    EOSINOPHILS 4 0 - 5 %    BASOPHILS 0 0 - 2 %    ABS. NEUTROPHILS 5.4 1.8 - 8.0 K/UL    ABS.  LYMPHOCYTES 1.1 0.9 - 3.6 K/UL    ABS. MONOCYTES 0.8 0.05 - 1.2 K/UL    ABS. EOSINOPHILS 0.3 0.0 - 0.4 K/UL    ABS. BASOPHILS 0.0 0.0 - 0.1 K/UL    DF AUTOMATED     CBC WITH AUTOMATED DIFF    Collection Time: 03/14/19  6:02 AM   Result Value Ref Range    WBC 8.7 4.6 - 13.2 K/uL    RBC 3.09 (L) 4.70 - 5.50 M/uL    HGB 9.0 (L) 13.0 - 16.0 g/dL    HCT 28.7 (L) 36.0 - 48.0 %    MCV 92.9 74.0 - 97.0 FL    MCH 29.1 24.0 - 34.0 PG    MCHC 31.4 31.0 - 37.0 g/dL    RDW 13.7 11.6 - 14.5 %    PLATELET 656 846 - 896 K/uL    MPV 11.0 9.2 - 11.8 FL    NEUTROPHILS 72 40 - 73 %    LYMPHOCYTES 13 (L) 21 - 52 %    MONOCYTES 11 (H) 3 - 10 %    EOSINOPHILS 3 0 - 5 %    BASOPHILS 1 0 - 2 %    ABS. NEUTROPHILS 6.3 1.8 - 8.0 K/UL    ABS. LYMPHOCYTES 1.1 0.9 - 3.6 K/UL    ABS. MONOCYTES 1.0 0.05 - 1.2 K/UL    ABS. EOSINOPHILS 0.3 0.0 - 0.4 K/UL    ABS.  BASOPHILS 0.0 0.0 - 0.1 K/UL    DF AUTOMATED       Additional Data Reviewed:      Signed By: Natty Jones MD     March 14, 2019 12:21 PM

## 2019-03-14 NOTE — ROUTINE PROCESS
Bedside shift change report given to Lisa Cano (oncoming nurse) by Sabrina Sacks   (offgoing nurse).  Report included the following information SBAR, Kardex, Intake/Output, Recent Results and Cardiac Rhythm SB.

## 2019-03-14 NOTE — PROGRESS NOTES
[facility], hospitalized 2/22/19-2/27/19, 3/1/19-3/8/19, at which point he went to [facility], stayed 5 days and presented to [facility] for GI bleeding, pt will have colonoscopy tomorrow. Patient has dementia, thoroughly confused. CM attempted to complete intitial assessment, called pt's daughter Dustin Barker, [phone], however voicemail is not set up, unable to leave a message. Called Parul at [facility], they can accept pt back, but he will need a new authorization from insurance, will need PT/OT notes.       MAE Neil  Case Management  [phone]

## 2019-03-14 NOTE — H&P
History & Physical    Patient: Steve Maldonado MRN: [id number]  CSN: [id number]    YOB: 1929  Age: 80 y.o. Sex: male      DOA: 3/13/2019    Chief Complaint:   Chief Complaint   Patient presents with    Rectal Bleeding          HPI:      Steve Maldonado is a 80 y.o. male with HLD, CAD, DM, prostate cancer who presents via EMS from [facility] with c/o blood in stool approximately 30 minutes PTA. Patient denies any pain and says has had blood in past but was a smaller amount and was not seen for issue. No other concerns or symptoms at this time. Per Dr. Haque Kin rectal exam showed melenic stool and he stated he discussed with GI     Patient hemodynamically stable and he is alert, repeat CBC stable         Past Medical History:   Diagnosis Date    Arthritis     [facility]     Coronary atherosclerosis of native coronary artery     AS DESCRIBED IN CATH CARDIAC CATH IN 9/07 SHOWS 80% DISTAL LAD AND 40% PROXIMAL LAD DISEASE . HE IS ON MEDICAL TREATMENT. ASA AND B-BLOCKER 3/10 RENAL ARTERY DUPLEX : NL RT RENAL ARTERY WITH < 60% STENOSIS OF LEFT RENAL ARTERY    Diabetes ([facility]     Glaucoma     Hypertension     Impotence of organic origin     Malignant neoplasm of prostate ([address]     Other and unspecified hyperlipidemia        History reviewed. No pertinent surgical history. Family History   Problem Relation Age of Onset    Diabetes Other     Heart Disease Other        Social History     Socioeconomic History    Marital status:      Spouse name: Not on file    Number of children: Not on file    Years of education: Not on file    Highest education level: Not on file   Tobacco Use    Smoking status: Never Smoker    Smokeless tobacco: Never Used   Substance and Sexual Activity    Alcohol use: No       Prior to Admission medications    Medication Sig Start Date End Date Taking?  Authorizing Provider   montelukast (SINGULAIR) 10 mg tablet TAKE ONE TABLET BY MOUTH EVERY NIGHT AT BEDTIME 17  Yes Provider, Historical   rivastigmine tartrate (EXELON) 1.5 mg capsule TAKE ONE CAPSULE BY MOUTH TWICE DAILY WITH MEALS 12/3/17  Yes Provider, Historical   SITagliptin (JANUVIA) 100 mg tablet TAKE 1 TABLET BY MOUTH ONCE DAILY 10/23/17  Yes Provider, Historical   HYDROcodone-acetaminophen (NORCO) 5-325 mg per tablet Take 1 Tab by Mouth Every 6 Hours As Needed for Pain. 11/10/17  Yes Provider, Historical   FERROUS SULFATE (FEOSOL PO) Take  by mouth two (2) times a day. Yes Provider, Historical   cloNIDine (CATAPRES-TTS-2) 0.2 mg/24 hr patch 1 Patch by TransDERmal route every seven (7) days. Yes Provider, Historical   simvastatin (ZOCOR) 80 mg tablet Take 80 mg by mouth daily. Yes Provider, Historical   ramipril (ALTACE) 5 mg capsule Take  by mouth daily. Yes Provider, Historical   metoprolol-XL (TOPROL XL) 50 mg XL tablet Take  by mouth daily. Yes Provider, Historical   valsartan-hydrochlorothiazide (DIOVAN HCT) 160-12.5 mg per tablet Take 1 Tab by mouth daily. Yes Provider, Historical   amlodipine (NORVASC) 5 mg tablet Take 5 mg by mouth daily. Yes Provider, Historical   pioglitazone (ACTOS) 45 mg tablet Take  by mouth daily. Yes Provider, Historical   alfuzosin SR (UROXATRAL) 10 mg SR tablet Take  by mouth nightly as needed. Yes Provider, Historical   fexofenadine (ALLEGRA) 60 mg tablet Take 60 mg by mouth two (2) times a day. Yes Provider, Historical   aspirin (ASPIRIN) 325 mg tablet Take 81 mg by mouth daily. Yes Provider, Historical       No Known Allergies      Review of Systems  Patient demented       Physical Exam:     Physical Exam:  Visit Vitals  /65 (BP 1 Location: Left arm, BP Patient Position: At rest)   Pulse (!) 53   Temp 97.1 °F (36.2 °C)   Resp 20   Wt 95.3 kg (210 lb)   SpO2 95%   BMI 37.20 kg/m²           Temp (24hrs), Av.8 °F (36.6 °C), Min:97.1 °F (36.2 °C), Max:98.2 °F (36.8 °C)    No intake/output data recorded.    No intake/output data recorded. General:  Alert, cooperative, no distress, appears stated age. Head: Normocephalic, without obvious abnormality, atraumatic. Eyes:  Conjunctivae/corneas clear. PERRL, EOMs intact. Nose: Nares normal. No drainage or sinus tenderness. Neck: Supple, symmetrical, trachea midline, no adenopathy, thyroid: no enlargement, no carotid bruit and no JVD. Lungs:   Clear to auscultation bilaterally. Heart:  Regular rate and rhythm, S1, S2 normal.     Abdomen: Soft, non-tender. Bowel sounds normal.    Extremities: Extremities normal, atraumatic, no cyanosis or edema. Pulses: 2+ and symmetric all extremities. Skin:  No rashes or lesions   Neurologic: No focal motor or sensory deficit.        Labs Reviewed:    BMP:   Lab Results   Component Value Date/Time     03/13/2019 10:35 AM    K 4.6 03/13/2019 10:35 AM     (H) 03/13/2019 10:35 AM    CO2 28 03/13/2019 10:35 AM    AGAP 5 03/13/2019 10:35 AM     (H) 03/13/2019 10:35 AM    BUN 21 (H) 03/13/2019 10:35 AM    CREA 1.32 (H) 03/13/2019 10:35 AM    GFRAA >60 03/13/2019 10:35 AM    GFRNA 51 (L) 03/13/2019 10:35 AM     CMP:   Lab Results   Component Value Date/Time     03/13/2019 10:35 AM    K 4.6 03/13/2019 10:35 AM     (H) 03/13/2019 10:35 AM    CO2 28 03/13/2019 10:35 AM    AGAP 5 03/13/2019 10:35 AM     (H) 03/13/2019 10:35 AM    BUN 21 (H) 03/13/2019 10:35 AM    CREA 1.32 (H) 03/13/2019 10:35 AM    GFRAA >60 03/13/2019 10:35 AM    GFRNA 51 (L) 03/13/2019 10:35 AM    CA 9.0 03/13/2019 10:35 AM    MG 2.4 03/13/2019 10:35 AM     CBC:   Lab Results   Component Value Date/Time    WBC 7.7 03/13/2019 08:05 PM    HGB 9.7 (L) 03/13/2019 08:05 PM    HCT 30.1 (L) 03/13/2019 08:05 PM     03/13/2019 08:05 PM         Procedures/imaging: see electronic medical records for all procedures/Xrays and details which were not copied into this note but were reviewed prior to creation of Plan      Assessment/Plan     Active Problems:    GI bleed likely upper     Dementia     Hypertension     Dementia      Hx of CVA      Admit patient to telemetry floor   CBC and Vitals are stable   Monitor CBC   Protonix Iv BID   Clear liquid diet  continue Rivastigmine and Zocor   Hold BP meds, ASA and Iron   GI did not drop a note so far, please follow up with them       DVT/GI Prophylaxis: SCD's    Diet: clear   Code: full Code    Guzman Lorenzo MD  3/13/2019 8:13 PM

## 2019-03-14 NOTE — PROGRESS NOTES
conducted a Follow up consultation and Spiritual Assessment for Nile Session, who is a 80 y.o.,male. The  provided the following Interventions:  Continued the relationship of care and support. Listened empathically. Chart reviewed. The following outcomes were achieved:  Patient expressed gratitude for 's visit. Assessment:  Patient indicated that he was doing alright. There are no further spiritual or Spiritism issues which require Spiritual Care Services interventions at this time. Plan:  Chaplains will continue to follow and will provide pastoral care on an as needed/requested basis.  recommends bedside caregivers page  on duty if patient shows signs of acute spiritual or emotional distress.      [address] [facility] Certified [address] [place] [address]   [phone]

## 2019-03-14 NOTE — CONSULTS
[email or web address]  [phone]    GASTROENTEROLOGY CONSULT      Impression:   1. Hematochezia with blood clots likely diverticular bleed  2. Long term use of antiplatelet  3. Dementia  4. History of malignant neoplasm of prostate      Plan:     1. Colonoscopy scheduled for 3/15/2019  2. Golytely prep tonight  3. Clear liquid diet today; NPO after midnight  4. Continue PPI  5. Monitor Hgb/Hct and transfuse per policy  6. Avoid NSAIDs/anticoagulant due to increased risk of GI bleeding  7. Continue medical management per primary team      Chief Complaint: rectal bleeding      HPI:  Ava Figueroa is a 80 y.o. male who I am being asked to see in consultation for an opinion regarding rectal bleeding associated blood clots and rectal pain about 3 days ago. No abdominal pain, nausea, vomiting. Patient denies constipation or loose stools. Patient is a resident of a nursing facility and rectal bleeding noted during hygiene care. Patient was on daily aspirin due to CVA history; no other NSAIDs or anticoagulant. History of prostate cancer. In ED, patient was noted to have rectal bleeding, but clinically stable (H/H 9.4, 30.3) not requiring blood products. Last colonoscopy 7/2009 noting multiple severe diverticulosis in sigmoid colon. PMH:   Past Medical History:   Diagnosis Date    Arthritis     Cancer ([address]     Coronary atherosclerosis of native coronary artery     AS DESCRIBED IN CATH CARDIAC CATH IN 9/07 SHOWS 80% DISTAL LAD AND 40% PROXIMAL LAD DISEASE . HE IS ON MEDICAL TREATMENT. ASA AND B-BLOCKER 3/10 RENAL ARTERY DUPLEX : NL RT RENAL ARTERY WITH < 60% STENOSIS OF LEFT RENAL ARTERY    Diabetes ([address]     Glaucoma     Hypertension     Impotence of organic origin     Malignant neoplasm of prostate ([address]     Other and unspecified hyperlipidemia        PSH:   History reviewed. No pertinent surgical history.     Social HX:   Social History     Socioeconomic History    Marital status:  Spouse name: Not on file    Number of children: Not on file    Years of education: Not on file    Highest education level: Not on file   Social Needs    Financial resource strain: Not on file    Food insecurity - worry: Not on file    Food insecurity - inability: Not on file    Transportation needs - medical: Not on file   ONTRAPORT needs - non-medical: Not on file   Occupational History    Not on file   Tobacco Use    Smoking status: Never Smoker    Smokeless tobacco: Never Used   Substance and Sexual Activity    Alcohol use: No    Drug use: Not on file    Sexual activity: Not on file   Other Topics Concern    Not on file   Social History Narrative    Not on file       FHX:   Family History   Problem Relation Age of Onset    Diabetes Other     Heart Disease Other        Allergy:   No Known Allergies    Patient Active Problem List   Diagnosis Code    [facility] C61    Coronary atherosclerosis of native coronary artery I25.10    Other and unspecified hyperlipidemia E78.5    GI bleed K92.2       Home Medications:     Medications Prior to Admission   Medication Sig    montelukast (SINGULAIR) 10 mg tablet TAKE ONE TABLET BY MOUTH EVERY NIGHT AT BEDTIME    rivastigmine tartrate (EXELON) 1.5 mg capsule TAKE ONE CAPSULE BY MOUTH TWICE DAILY WITH MEALS    SITagliptin (JANUVIA) 100 mg tablet TAKE 1 TABLET BY MOUTH ONCE DAILY    HYDROcodone-acetaminophen (NORCO) 5-325 mg per tablet Take 1 Tab by Mouth Every 6 Hours As Needed for Pain.  FERROUS SULFATE (FEOSOL PO) Take  by mouth two (2) times a day.  cloNIDine (CATAPRES-TTS-2) 0.2 mg/24 hr patch 1 Patch by TransDERmal route every seven (7) days.  simvastatin (ZOCOR) 80 mg tablet Take 80 mg by mouth daily.  ramipril (ALTACE) 5 mg capsule Take  by mouth daily.  metoprolol-XL (TOPROL XL) 50 mg XL tablet Take  by mouth daily.     valsartan-hydrochlorothiazide (DIOVAN HCT) 160-12.5 mg per tablet Take 1 Tab by mouth daily.  amlodipine (NORVASC) 5 mg tablet Take 5 mg by mouth daily.  pioglitazone (ACTOS) 45 mg tablet Take  by mouth daily.  alfuzosin SR (UROXATRAL) 10 mg SR tablet Take  by mouth nightly as needed.  fexofenadine (ALLEGRA) 60 mg tablet Take 60 mg by mouth two (2) times a day.  aspirin (ASPIRIN) 325 mg tablet Take 81 mg by mouth daily. Review of Systems:     Constitutional: No fevers, chills, weight loss, fatigue. Skin: No rashes, pruritis, jaundice, ulcerations, erythema. HENT: No headaches, nosebleeds, sinus pressure, rhinorrhea, sore throat. Eyes: No visual changes, blurred vision, eye pain, photophobia, jaundice. Cardiovascular: No chest pain, heart palpitations. Respiratory: No cough, SOB, wheezing, chest discomfort, orthopnea. Gastrointestinal: Rectal bleeding, rectal pain   Genitourinary: No dysuria, bleeding, discharge, pyuria. Musculoskeletal: No weakness, arthralgias, wasting. Endo: No sweats. Heme: No bruising, easy bleeding. Allergies: As noted. Neurological: Alert and oriented. Gait not assessed. Psychiatric:  No anxiety, depression, hallucinations. Visit Vitals  /63 (BP 1 Location: Left arm, BP Patient Position: At rest)   Pulse 66   Temp 95 °F (35 °C)   Resp 22   Wt 95.8 kg (211 lb 1.6 oz)   SpO2 94%   BMI 37.39 kg/m²       Physical Assessment:     GENERAL ASSESSMENT: well developed and well nourished and well hydrated  SKIN: normal color, no lesions  HEAD: normocephalic  EYES: normal eyes  EARS: normal  NOSE: normal external appearance and nares patent  MOUTH: moist mucosa  NECK: normal  CHEST: normal air exchange, no rales, no rhonchi, no wheezes, respiratory effort normal with no retractions  HEART: regular rate and rhythm, normal S1/S2, no murmurs  ABDOMEN: soft, non-distended, no masses, no hepatosplenomegaly, bowel sounds normal  ANAL: large quantity maroon color blood/clots.  Polyp appreciated in rectal vault  EXTREMITY: normal and symmetric movement, no joint swelling  NEURO: gross motor exam normal by observation  PSYCH: normal mood/affect; alert and oriented       Basic Metabolic Profile   Recent Labs     03/13/19  1035      K 4.6   *   CO2 28   BUN 21*   *   CA 9.0   MG 2.4         CBC w/Diff    Recent Labs     03/14/19  0602   WBC 8.7   RBC 3.09*   HGB 9.0*   HCT 28.7*   MCV 92.9   MCH 29.1   MCHC 31.4   RDW 13.7       Recent Labs     03/14/19  0602   GRANS 72   LYMPH 13*   EOS 3        Hepatic Function   No results for input(s): ALB, TP, TBILI, GPT, SGOT, AP, AML, LPSE in the last 72 hours. No lab exists for component: DBILI     Coags   No results for input(s): PTP, INR, APTT in the last 72 hours. No lab exists for component: [employer] [facility] [address]  Cell: [phone]  [email or web address]

## 2019-03-15 ENCOUNTER — ANESTHESIA (OUTPATIENT)
Dept: ENDOSCOPY | Age: 84
DRG: 378 | End: 2019-03-15
Payer: MEDICARE

## 2019-03-15 LAB
ANION GAP SERPL CALC-SCNC: 6 MMOL/L (ref 3–18)
BASOPHILS # BLD: 0 K/UL (ref 0–0.1)
BASOPHILS NFR BLD: 0 % (ref 0–2)
BUN SERPL-MCNC: 19 MG/DL (ref 7–18)
BUN/CREAT SERPL: 15 (ref 12–20)
CALCIUM SERPL-MCNC: 8.9 MG/DL (ref 8.5–10.1)
CHLORIDE SERPL-SCNC: 112 MMOL/L (ref 100–108)
CO2 SERPL-SCNC: 25 MMOL/L (ref 21–32)
CREAT SERPL-MCNC: 1.27 MG/DL (ref 0.6–1.3)
DIFFERENTIAL METHOD BLD: ABNORMAL
EOSINOPHIL # BLD: 0.2 K/UL (ref 0–0.4)
EOSINOPHIL NFR BLD: 2 % (ref 0–5)
ERYTHROCYTE [DISTWIDTH] IN BLOOD BY AUTOMATED COUNT: 13.9 % (ref 11.6–14.5)
GLUCOSE SERPL-MCNC: 114 MG/DL (ref 74–99)
HCT VFR BLD AUTO: 24.9 % (ref 36–48)
HCT VFR BLD AUTO: 25.4 % (ref 36–48)
HGB BLD-MCNC: 7.8 G/DL (ref 13–16)
HGB BLD-MCNC: 8 G/DL (ref 13–16)
LYMPHOCYTES # BLD: 1.5 K/UL (ref 0.9–3.6)
LYMPHOCYTES NFR BLD: 16 % (ref 21–52)
MAGNESIUM SERPL-MCNC: 2.3 MG/DL (ref 1.6–2.6)
MCH RBC QN AUTO: 29.6 PG (ref 24–34)
MCHC RBC AUTO-ENTMCNC: 31.5 G/DL (ref 31–37)
MCV RBC AUTO: 94.1 FL (ref 74–97)
MONOCYTES # BLD: 0.9 K/UL (ref 0.05–1.2)
MONOCYTES NFR BLD: 10 % (ref 3–10)
NEUTS SEG # BLD: 6.5 K/UL (ref 1.8–8)
NEUTS SEG NFR BLD: 72 % (ref 40–73)
PLATELET # BLD AUTO: 212 K/UL (ref 135–420)
PMV BLD AUTO: 11.4 FL (ref 9.2–11.8)
POTASSIUM SERPL-SCNC: 4.3 MMOL/L (ref 3.5–5.5)
RBC # BLD AUTO: 2.7 M/UL (ref 4.7–5.5)
SODIUM SERPL-SCNC: 143 MMOL/L (ref 136–145)
WBC # BLD AUTO: 9.1 K/UL (ref 4.6–13.2)

## 2019-03-15 PROCEDURE — 85018 HEMOGLOBIN: CPT

## 2019-03-15 PROCEDURE — 65660000000 HC RM CCU STEPDOWN

## 2019-03-15 PROCEDURE — 74011250636 HC RX REV CODE- 250/636: Performed by: INTERNAL MEDICINE

## 2019-03-15 PROCEDURE — 74011000250 HC RX REV CODE- 250: Performed by: INTERNAL MEDICINE

## 2019-03-15 PROCEDURE — 74011250637 HC RX REV CODE- 250/637: Performed by: INTERNAL MEDICINE

## 2019-03-15 PROCEDURE — C9113 INJ PANTOPRAZOLE SODIUM, VIA: HCPCS | Performed by: INTERNAL MEDICINE

## 2019-03-15 PROCEDURE — 97116 GAIT TRAINING THERAPY: CPT

## 2019-03-15 PROCEDURE — 83735 ASSAY OF MAGNESIUM: CPT

## 2019-03-15 PROCEDURE — 36415 COLL VENOUS BLD VENIPUNCTURE: CPT

## 2019-03-15 PROCEDURE — 85025 COMPLETE CBC W/AUTO DIFF WBC: CPT

## 2019-03-15 PROCEDURE — 74011250637 HC RX REV CODE- 250/637: Performed by: EMERGENCY MEDICINE

## 2019-03-15 PROCEDURE — 97161 PT EVAL LOW COMPLEX 20 MIN: CPT

## 2019-03-15 PROCEDURE — 80048 BASIC METABOLIC PNL TOTAL CA: CPT

## 2019-03-15 RX ORDER — POLYETHYLENE GLYCOL 3350 17 G/17G
255 POWDER, FOR SOLUTION ORAL ONCE
Status: DISCONTINUED | OUTPATIENT
Start: 2019-03-15 | End: 2019-03-15 | Stop reason: RX

## 2019-03-15 RX ORDER — POLYETHYLENE GLYCOL 3350 17 G/17G
238 POWDER, FOR SOLUTION ORAL ONCE
Status: COMPLETED | OUTPATIENT
Start: 2019-03-15 | End: 2019-03-15

## 2019-03-15 RX ORDER — POLYETHYLENE GLYCOL 3350 17 G/17G
238 POWDER, FOR SOLUTION ORAL ONCE
Status: DISCONTINUED | OUTPATIENT
Start: 2019-03-15 | End: 2019-03-15

## 2019-03-15 RX ORDER — BISACODYL 5 MG
10 TABLET, DELAYED RELEASE (ENTERIC COATED) ORAL ONCE
Status: DISCONTINUED | OUTPATIENT
Start: 2019-03-15 | End: 2019-03-15

## 2019-03-15 RX ORDER — HYDRALAZINE HYDROCHLORIDE 20 MG/ML
10 INJECTION INTRAMUSCULAR; INTRAVENOUS
Status: DISCONTINUED | OUTPATIENT
Start: 2019-03-15 | End: 2019-03-21 | Stop reason: HOSPADM

## 2019-03-15 RX ADMIN — Medication 10 ML: at 17:40

## 2019-03-15 RX ADMIN — SIMVASTATIN 80 MG: 40 TABLET, FILM COATED ORAL at 10:55

## 2019-03-15 RX ADMIN — Medication 10 ML: at 23:30

## 2019-03-15 RX ADMIN — RIVASTIGMINE TARTRATE 1.5 MG: 1.5 CAPSULE ORAL at 19:59

## 2019-03-15 RX ADMIN — SODIUM CHLORIDE 40 MG: 9 INJECTION INTRAMUSCULAR; INTRAVENOUS; SUBCUTANEOUS at 10:55

## 2019-03-15 RX ADMIN — POLYETHYLENE GLYCOL 3350 238 G: 17 POWDER, FOR SOLUTION ORAL at 19:58

## 2019-03-15 RX ADMIN — RIVASTIGMINE TARTRATE 1.5 MG: 1.5 CAPSULE ORAL at 10:55

## 2019-03-15 NOTE — PROGRESS NOTES
Discharge planning: Will need reauthorization for SNF, pt will need new PT/OT notes, awaiting evals to send to SNF. Attempted to contact Jillian Sawyer (child) again [phone], no answer, voicemail not set up. Alon Mcgill (son) listed in room, [phone], contacted and confirmed d/c plan of returning to [facility], pt's son asked about pt's condition, CM informed him pt would be getting a colonoscopy today, instructed to call nurses station with further questions. Patient remains confused.          MAE Torres  Case Management  [phone]

## 2019-03-15 NOTE — PROGRESS NOTES
Patient has been having difficulty with drinking MD Salbador Seth informed who states that it can be stopped and she will inform the team

## 2019-03-15 NOTE — PROGRESS NOTES
[facility]  Progress Note    Patient: Nathaniel Muse Age: 80 y.o. : 3/11/1929 MR#: [id number] SSN: [id number]  Date/Time: 3/15/2019    Subjective:     Patient is alert and awake and some chronic disorientation   Family at bed side   No cp  No sob  No NVD     Patient had sidney blood in stool today and yesterday         Assessment/Plan:     -GI Bleed, ?diverticular  - acute blood loss anemia   - HTN  - DM2  - Dementia  - h/o Prostate Cancer    PLAN    - family in room , plan of care d/w them   - some drop in h/H noted - continue to monitor   - Colonoscopy today - patient did not take good prep   - BP on higher side - prn hydralazine - once endoscopy done will change to home po meds       Case discussed with:  []Patient  []Family  []Nursing  []Case Management  DVT Prophylaxis:  []Lovenox  []Hep SQ  []SCDs  []Coumadin   []On Heparin gtt    Objective:   VS:   Visit Vitals  /69 (BP 1 Location: Right arm, BP Patient Position: At rest)   Pulse 72   Temp 96.7 °F (35.9 °C)   Resp 20   Wt 96.2 kg (212 lb)   SpO2 97%   BMI 37.55 kg/m²      Tmax/24hrs: Temp (24hrs), Av.9 °F (36.1 °C), Min:95.4 °F (35.2 °C), Max:97.5 °F (36.4 °C)    Input/Output: No intake or output data in the 24 hours ending 03/15/19 1254    General:  Awake, follows commands  Cardiovascular:  S1S2+, RRR  Pulmonary:  CTA b/l  GI:  Soft, BS+, NT, ND  Extremities:  trace edema      Labs:    Recent Results (from the past 24 hour(s))   HGB & HCT    Collection Time: 19  1:15 PM   Result Value Ref Range    HGB 7.9 (L) 13.0 - 16.0 g/dL    HCT 24.9 (L) 36.0 - 48.0 %   HGB & HCT    Collection Time: 19  8:30 PM   Result Value Ref Range    HGB 7.9 (L) 13.0 - 16.0 g/dL    HCT 24.9 (L) 36.0 - 48.0 %   GLUCOSE, POC    Collection Time: 19 10:44 PM   Result Value Ref Range    Glucose (POC) 155 (H) 70 - 110 mg/dL   CBC WITH AUTOMATED DIFF    Collection Time: 03/15/19  6:30 AM   Result Value Ref Range    WBC 9.1 4.6 - 13.2 K/uL    RBC 2.70 (L) 4.70 - 5.50 M/uL    HGB 8.0 (L) 13.0 - 16.0 g/dL    HCT 25.4 (L) 36.0 - 48.0 %    MCV 94.1 74.0 - 97.0 FL    MCH 29.6 24.0 - 34.0 PG    MCHC 31.5 31.0 - 37.0 g/dL    RDW 13.9 11.6 - 14.5 %    PLATELET 568 000 - 124 K/uL    MPV 11.4 9.2 - 11.8 FL    NEUTROPHILS 72 40 - 73 %    LYMPHOCYTES 16 (L) 21 - 52 %    MONOCYTES 10 3 - 10 %    EOSINOPHILS 2 0 - 5 %    BASOPHILS 0 0 - 2 %    ABS. NEUTROPHILS 6.5 1.8 - 8.0 K/UL    ABS. LYMPHOCYTES 1.5 0.9 - 3.6 K/UL    ABS. MONOCYTES 0.9 0.05 - 1.2 K/UL    ABS. EOSINOPHILS 0.2 0.0 - 0.4 K/UL    ABS.  BASOPHILS 0.0 0.0 - 0.1 K/UL    DF AUTOMATED     METABOLIC PANEL, BASIC    Collection Time: 03/15/19  6:30 AM   Result Value Ref Range    Sodium 143 136 - 145 mmol/L    Potassium 4.3 3.5 - 5.5 mmol/L    Chloride 112 (H) 100 - 108 mmol/L    CO2 25 21 - 32 mmol/L    Anion gap 6 3.0 - 18 mmol/L    Glucose 114 (H) 74 - 99 mg/dL    BUN 19 (H) 7.0 - 18 MG/DL    Creatinine 1.27 0.6 - 1.3 MG/DL    BUN/Creatinine ratio 15 12 - 20      GFR est AA >60 >60 ml/min/1.73m2    GFR est non-AA 53 (L) >60 ml/min/1.73m2    Calcium 8.9 8.5 - 10.1 MG/DL   MAGNESIUM    Collection Time: 03/15/19  6:30 AM   Result Value Ref Range    Magnesium 2.3 1.6 - 2.6 mg/dL     Additional Data Reviewed:      Signed By: Maira Marques MD     March 15, 2019 12:21 PM

## 2019-03-15 NOTE — PROGRESS NOTES
Problem: Mobility Impaired (Adult and Pediatric)  Goal: *Acute Goals and Plan of Care (Insert Text)  Physical Therapy Goals  Initiated 3/15/2019 and to be accomplished within 7 day(s)  1. Patient will move from supine to sit and sit to supine , scoot up and down and roll side to side in bed with supervision/set-up. 2.  Patient will transfer from bed to chair and chair to bed with supervision/set-up using the least restrictive device. 3.  Patient will perform sit to stand with supervision/set-up. 4.  Patient will ambulate with supervision/set-up for >100 feet with the least restrictive device. physical Therapy EVALUATION    Patient: Ponce Kent (71 y.o. male)  Date: 3/15/2019  Primary Diagnosis: GI bleed [K92.2]  Procedure(s) (LRB):  COLONOSCOPY (N/A)     Precautions: Fall   ASSESSMENT :  Patient is 79yo M admitted to hospital for GIB and presents today alert and agreeable to therapy and was supine in bed upon arrival. Patient performed mobility as listed below with fair carryover of learning. Patient required additional time and cues for motor planning to get OOB. Patient also required cues to keep gaze forward as he fixed gaze at feet during session. Gait was slow speed with decreased step length bilaterally and fatigue with decreasing posture with increasing distance. Patient reported mild dizziness that did not interfere with ability to participate and Hbg 8.0 this session. At conclusion of session, patient left resting with call bell by his side and educated on role and goals of therapy. Patient educated not to get up without assist and oriented to call bell. Son in room and both denied further need for assist/questions. Patient will benefit from skilled intervention to address the above impairments.   Patients rehabilitation potential is considered to be Good  Factors which may influence rehabilitation potential include:   []         None noted  [x]         Mental ability/status  [x] Medical condition  [x]         Home/family situation and support systems  [x]         Safety awareness  [x]         Pain tolerance/management  []         Other:      PLAN :  Recommendations and Planned Interventions:  [x]           Bed Mobility Training             [x]    Neuromuscular Re-Education  [x]           Transfer Training                   []    Orthotic/Prosthetic Training  [x]           Gait Training                          []    Modalities  [x]           Therapeutic Exercises          []    Edema Management/Control  [x]           Therapeutic Activities            [x]    Patient and Family Training/Education  []           Other (comment):    Frequency/Duration: Patient will be followed by physical therapy 1-2 times per day/4-7 days per week to address goals. Discharge Recommendations: Robert Paredes  Further Equipment Recommendations for Discharge: N/A       G-CODES     Eval Complexity: History: MEDIUM  Complexity : 1-2 comorbidities / personal factors will impact the outcome/ POC Exam:LOW Complexity : 1-2 Standardized tests and measures addressing body structure, function, activity limitation and / or participation in recreation  Presentation: LOW Complexity : Stable, uncomplicated  Clinical Decision Making:Low Complexity   Overall Complexity:LOW     SUBJECTIVE:   Patient stated I did one therapy when I got there.     OBJECTIVE DATA SUMMARY:     Past Medical History:   Diagnosis Date    Arthritis     Cancer ([facility]     Coronary atherosclerosis of native coronary artery     AS DESCRIBED IN CATH CARDIAC CATH IN 9/07 SHOWS 80% DISTAL LAD AND 40% PROXIMAL LAD DISEASE . HE IS ON MEDICAL TREATMENT. ASA AND B-BLOCKER 3/10 RENAL ARTERY DUPLEX : NL RT RENAL ARTERY WITH < 60% STENOSIS OF LEFT RENAL ARTERY    Diabetes ([facility]     Glaucoma     Hypertension     Impotence of organic origin     Malignant neoplasm of prostate ([facility]     Other and unspecified hyperlipidemia    History reviewed.  No pertinent surgical history. Barriers to Learning/Limitations: None  Compensate with: N/A  Prior Level of Function/Home Situation: Patient came from SNF for rehab and plan was to return to [facility] upon D/C from SNF. Patient was using Rollator for mobility and had minimal assist for I/ADL's at [facility]. Home Situation  Home Environment: Assisted living  One/Two Story Residence: One story  Living Alone: No  Support Systems: Assisted living  Patient Expects to be Discharged to[de-identified] Assisted living  Current DME Used/Available at Home: [place] Behavior:    A&Ox3  Strength:    Strength: Generally decreased, functional(BLE)   Tone & Sensation:   Tone: Normal(BLE)   Sensation: Intact(BLE)   Range Of Motion:  AROM: Within functional limits(BLE)   Functional Mobility:  Bed Mobility:   Supine to Sit: Minimum assistance   Scooting: Contact guard assistance  Transfers:  Sit to Stand: Minimum assistance  Stand to Sit: Contact guard assistance;Minimum assistance   Balance:   Sitting: Intact; With support  Standing: Impaired; With support  Standing - Static: Good  Standing - Dynamic : Fair  Ambulation/Gait Training:  Distance (ft): 55 Feet (ft)  Assistive Device: Walker, rolling  Ambulation - Level of Assistance: Contact guard assistance   Base of Support: Narrowed  Speed/Marlen: Slow  Interventions: Tactile cues; Verbal cues; Visual/Demos               Pain:  Pt reports 0/10 pain or discomfort prior to treatment.    Pt reports 0/10 pain or discomfort post treatment. Activity Tolerance:   Patient tolerated activity well and was left resting with call bell by his side; motivated to participate in therapy. Please refer to the flowsheet for vital signs taken during this treatment.   After treatment:   [x]         Patient left in no apparent distress sitting up in chair  []         Patient left in no apparent distress in bed  [x]         Call bell left within reach  []         Nursing notified  [x]         Caregiver present  [] Bed alarm activated  []         SCDs in place to B LE     COMMUNICATION/EDUCATION:   [x]         Fall prevention education was provided and the patient/caregiver indicated understanding. [x]         Patient/family have participated as able in goal setting and plan of care. [x]         Patient/family agree to work toward stated goals and plan of care. []         Patient understands intent and goals of therapy, but is neutral about his/her participation. []         Patient is unable to participate in goal setting and plan of care.     Thank you for this referral.  Gordon Mcconnell, PT   Time Calculation: 24 mins

## 2019-03-15 NOTE — PROGRESS NOTES
[email or web address]  [phone]    Gastroenterology follow up-Progress note      [email or web address]  [phone]    GASTROENTEROLOGY CONSULT      Impression:   1. Hematochezia with blood clots likely diverticular bleed  - scheduled colonoscopy (3/15/2019) was canceled due to poor prep  2. Long term use of antiplatelet  3. Dementia  4. History of malignant neoplasm of prostate      Plan:     1. Colonoscopy scheduled for 3/16/2019  2. Miralax/dulcolax prep tonight. - Please call on-call GI provider if patient not compliant with prep   3. Clear liquid diet today; NPO after midnight  4. Continue PPI  5. Monitor Hgb/Hct and transfuse per policy  6. Avoid NSAIDs/anticoagulant due to increased risk of GI bleeding  7. Continue medical management per primary team      Subjective:  Doing well; cheerful disposition. No acute GI events overnight. Did not drink all Golytely prep - colonoscopy rescheduled for tomorrow. ROS: Denies any fevers, chills, rash. Patient Active Problem List   Diagnosis Code    Malignant neoplasm of prostate ([facility] C61    Coronary atherosclerosis of native coronary artery I25.10    Other and unspecified hyperlipidemia E78.5    GI bleed K92.2         Visit Vitals  /69 (BP 1 Location: Right arm, BP Patient Position: At rest)   Pulse 72   Temp 96.7 °F (35.9 °C)   Resp 20   Wt 96.2 kg (212 lb)   SpO2 97%   BMI 37.55 kg/m²       No acute distress  Respiratory effort unlabored, no wheezing.   Abdomen soft; nontender; bowel sounds present  Neuro non focal exam    No intake or output data in the 24 hours ending 03/15/19 1420     Recent Results (from the past 24 hour(s))   HGB & HCT    Collection Time: 03/14/19  8:30 PM   Result Value Ref Range    HGB 7.9 (L) 13.0 - 16.0 g/dL    HCT 24.9 (L) 36.0 - 48.0 %   GLUCOSE, POC    Collection Time: 03/14/19 10:44 PM   Result Value Ref Range    Glucose (POC) 155 (H) 70 - 110 mg/dL   CBC WITH AUTOMATED DIFF    Collection Time: 03/15/19  6:30 AM Result Value Ref Range    WBC 9.1 4.6 - 13.2 K/uL    RBC 2.70 (L) 4.70 - 5.50 M/uL    HGB 8.0 (L) 13.0 - 16.0 g/dL    HCT 25.4 (L) 36.0 - 48.0 %    MCV 94.1 74.0 - 97.0 FL    MCH 29.6 24.0 - 34.0 PG    MCHC 31.5 31.0 - 37.0 g/dL    RDW 13.9 11.6 - 14.5 %    PLATELET 910 838 - 506 K/uL    MPV 11.4 9.2 - 11.8 FL    NEUTROPHILS 72 40 - 73 %    LYMPHOCYTES 16 (L) 21 - 52 %    MONOCYTES 10 3 - 10 %    EOSINOPHILS 2 0 - 5 %    BASOPHILS 0 0 - 2 %    ABS. NEUTROPHILS 6.5 1.8 - 8.0 K/UL    ABS. LYMPHOCYTES 1.5 0.9 - 3.6 K/UL    ABS. MONOCYTES 0.9 0.05 - 1.2 K/UL    ABS. EOSINOPHILS 0.2 0.0 - 0.4 K/UL    ABS. BASOPHILS 0.0 0.0 - 0.1 K/UL    DF AUTOMATED     METABOLIC PANEL, BASIC    Collection Time: 03/15/19  6:30 AM   Result Value Ref Range    Sodium 143 136 - 145 mmol/L    Potassium 4.3 3.5 - 5.5 mmol/L    Chloride 112 (H) 100 - 108 mmol/L    CO2 25 21 - 32 mmol/L    Anion gap 6 3.0 - 18 mmol/L    Glucose 114 (H) 74 - 99 mg/dL    BUN 19 (H) 7.0 - 18 MG/DL    Creatinine 1.27 0.6 - 1.3 MG/DL    BUN/Creatinine ratio 15 12 - 20      GFR est AA >60 >60 ml/min/1.73m2    GFR est non-AA 53 (L) >60 ml/min/1.73m2    Calcium 8.9 8.5 - 10.1 MG/DL   MAGNESIUM    Collection Time: 03/15/19  6:30 AM   Result Value Ref Range    Magnesium 2.3 1.6 - 2.6 mg/dL         IDA Dnoaldson    [facility]. [email or web address]  Phone: [phone]  Pager: [phone]

## 2019-03-16 ENCOUNTER — APPOINTMENT (OUTPATIENT)
Dept: GENERAL RADIOLOGY | Age: 84
DRG: 378 | End: 2019-03-16
Attending: INTERNAL MEDICINE
Payer: MEDICARE

## 2019-03-16 PROBLEM — K64.8 INTERNAL HEMORRHOIDS: Status: ACTIVE | Noted: 2019-03-16

## 2019-03-16 PROBLEM — K62.6 STERCORAL ULCER OF RECTUM: Status: ACTIVE | Noted: 2019-03-16

## 2019-03-16 PROBLEM — K57.90 DIVERTICULOSIS: Status: ACTIVE | Noted: 2019-03-16

## 2019-03-16 LAB
ANION GAP SERPL CALC-SCNC: 4 MMOL/L (ref 3–18)
BASOPHILS # BLD: 0 K/UL (ref 0–0.1)
BASOPHILS NFR BLD: 1 % (ref 0–2)
BUN SERPL-MCNC: 15 MG/DL (ref 7–18)
BUN/CREAT SERPL: 11 (ref 12–20)
CALCIUM SERPL-MCNC: 8.7 MG/DL (ref 8.5–10.1)
CHLORIDE SERPL-SCNC: 113 MMOL/L (ref 100–108)
CO2 SERPL-SCNC: 28 MMOL/L (ref 21–32)
CREAT SERPL-MCNC: 1.34 MG/DL (ref 0.6–1.3)
DIFFERENTIAL METHOD BLD: ABNORMAL
EOSINOPHIL # BLD: 0.2 K/UL (ref 0–0.4)
EOSINOPHIL NFR BLD: 3 % (ref 0–5)
ERYTHROCYTE [DISTWIDTH] IN BLOOD BY AUTOMATED COUNT: 14.1 % (ref 11.6–14.5)
GLUCOSE BLD STRIP.AUTO-MCNC: 119 MG/DL (ref 70–110)
GLUCOSE SERPL-MCNC: 154 MG/DL (ref 74–99)
HCT VFR BLD AUTO: 23.7 % (ref 36–48)
HCT VFR BLD AUTO: 24.1 % (ref 36–48)
HGB BLD-MCNC: 7.4 G/DL (ref 13–16)
HGB BLD-MCNC: 7.6 G/DL (ref 13–16)
INR PPP: 1.1 (ref 0.8–1.2)
LYMPHOCYTES # BLD: 1.3 K/UL (ref 0.9–3.6)
LYMPHOCYTES NFR BLD: 15 % (ref 21–52)
MCH RBC QN AUTO: 29.4 PG (ref 24–34)
MCHC RBC AUTO-ENTMCNC: 31.2 G/DL (ref 31–37)
MCV RBC AUTO: 94 FL (ref 74–97)
MONOCYTES # BLD: 1.1 K/UL (ref 0.05–1.2)
MONOCYTES NFR BLD: 13 % (ref 3–10)
NEUTS SEG # BLD: 5.6 K/UL (ref 1.8–8)
NEUTS SEG NFR BLD: 68 % (ref 40–73)
PLATELET # BLD AUTO: 228 K/UL (ref 135–420)
PMV BLD AUTO: 11.2 FL (ref 9.2–11.8)
POTASSIUM SERPL-SCNC: 4 MMOL/L (ref 3.5–5.5)
PROTHROMBIN TIME: 14.3 SEC (ref 11.5–15.2)
RBC # BLD AUTO: 2.52 M/UL (ref 4.7–5.5)
SODIUM SERPL-SCNC: 145 MMOL/L (ref 136–145)
WBC # BLD AUTO: 8.2 K/UL (ref 4.6–13.2)

## 2019-03-16 PROCEDURE — 80048 BASIC METABOLIC PNL TOTAL CA: CPT

## 2019-03-16 PROCEDURE — 77030008565 HC TBNG SUC IRR ERBE -B: Performed by: INTERNAL MEDICINE

## 2019-03-16 PROCEDURE — 74011250637 HC RX REV CODE- 250/637: Performed by: INTERNAL MEDICINE

## 2019-03-16 PROCEDURE — 77030013992 HC SNR POLYP ENDOSC BSC -B: Performed by: INTERNAL MEDICINE

## 2019-03-16 PROCEDURE — 0DBH8ZX EXCISION OF CECUM, VIA NATURAL OR ARTIFICIAL OPENING ENDOSCOPIC, DIAGNOSTIC: ICD-10-PCS | Performed by: INTERNAL MEDICINE

## 2019-03-16 PROCEDURE — 85610 PROTHROMBIN TIME: CPT

## 2019-03-16 PROCEDURE — 74018 RADEX ABDOMEN 1 VIEW: CPT

## 2019-03-16 PROCEDURE — 77030009426 HC FCPS BIOP ENDOSC BSC -B: Performed by: INTERNAL MEDICINE

## 2019-03-16 PROCEDURE — 85018 HEMOGLOBIN: CPT

## 2019-03-16 PROCEDURE — 77030037712 HC SYS DEL CAP USEN -B: Performed by: INTERNAL MEDICINE

## 2019-03-16 PROCEDURE — 74011250636 HC RX REV CODE- 250/636

## 2019-03-16 PROCEDURE — 36415 COLL VENOUS BLD VENIPUNCTURE: CPT

## 2019-03-16 PROCEDURE — 88305 TISSUE EXAM BY PATHOLOGIST: CPT

## 2019-03-16 PROCEDURE — 76040000008: Performed by: INTERNAL MEDICINE

## 2019-03-16 PROCEDURE — 74011250636 HC RX REV CODE- 250/636: Performed by: INTERNAL MEDICINE

## 2019-03-16 PROCEDURE — 74011250636 HC RX REV CODE- 250/636: Performed by: NURSE ANESTHETIST, CERTIFIED REGISTERED

## 2019-03-16 PROCEDURE — 82962 GLUCOSE BLOOD TEST: CPT

## 2019-03-16 PROCEDURE — 85025 COMPLETE CBC W/AUTO DIFF WBC: CPT

## 2019-03-16 PROCEDURE — 76060000033 HC ANESTHESIA 1 TO 1.5 HR: Performed by: INTERNAL MEDICINE

## 2019-03-16 PROCEDURE — 65660000000 HC RM CCU STEPDOWN

## 2019-03-16 PROCEDURE — 0DBK8ZX EXCISION OF ASCENDING COLON, VIA NATURAL OR ARTIFICIAL OPENING ENDOSCOPIC, DIAGNOSTIC: ICD-10-PCS | Performed by: INTERNAL MEDICINE

## 2019-03-16 PROCEDURE — 74011000250 HC RX REV CODE- 250: Performed by: NURSE ANESTHETIST, CERTIFIED REGISTERED

## 2019-03-16 PROCEDURE — 77030018846 HC SOL IRR STRL H20 ICUM -A: Performed by: INTERNAL MEDICINE

## 2019-03-16 PROCEDURE — 77030037875 HC DRSG MEPILEX <16IN BORD MOLN -A

## 2019-03-16 PROCEDURE — 77030011255 HC DSG AQUACEL AG BMS -A

## 2019-03-16 RX ORDER — PROPOFOL 10 MG/ML
INJECTION, EMULSION INTRAVENOUS AS NEEDED
Status: DISCONTINUED | OUTPATIENT
Start: 2019-03-16 | End: 2019-03-16 | Stop reason: HOSPADM

## 2019-03-16 RX ORDER — LIDOCAINE HYDROCHLORIDE 20 MG/ML
INJECTION, SOLUTION EPIDURAL; INFILTRATION; INTRACAUDAL; PERINEURAL AS NEEDED
Status: DISCONTINUED | OUTPATIENT
Start: 2019-03-16 | End: 2019-03-16 | Stop reason: HOSPADM

## 2019-03-16 RX ORDER — SODIUM CHLORIDE, SODIUM LACTATE, POTASSIUM CHLORIDE, CALCIUM CHLORIDE 600; 310; 30; 20 MG/100ML; MG/100ML; MG/100ML; MG/100ML
75 INJECTION, SOLUTION INTRAVENOUS CONTINUOUS
Status: DISCONTINUED | OUTPATIENT
Start: 2019-03-16 | End: 2019-03-16 | Stop reason: HOSPADM

## 2019-03-16 RX ORDER — SODIUM CHLORIDE 0.9 % (FLUSH) 0.9 %
5-40 SYRINGE (ML) INJECTION EVERY 8 HOURS
Status: DISCONTINUED | OUTPATIENT
Start: 2019-03-16 | End: 2019-03-16 | Stop reason: HOSPADM

## 2019-03-16 RX ORDER — INSULIN LISPRO 100 [IU]/ML
INJECTION, SOLUTION INTRAVENOUS; SUBCUTANEOUS ONCE
Status: DISCONTINUED | OUTPATIENT
Start: 2019-03-16 | End: 2019-03-16 | Stop reason: HOSPADM

## 2019-03-16 RX ORDER — SODIUM CHLORIDE 0.9 % (FLUSH) 0.9 %
5-40 SYRINGE (ML) INJECTION AS NEEDED
Status: DISCONTINUED | OUTPATIENT
Start: 2019-03-16 | End: 2019-03-16 | Stop reason: HOSPADM

## 2019-03-16 RX ADMIN — Medication 10 ML: at 05:31

## 2019-03-16 RX ADMIN — LACTULOSE 30 G: 20 SOLUTION ORAL at 11:28

## 2019-03-16 RX ADMIN — SODIUM CHLORIDE, SODIUM LACTATE, POTASSIUM CHLORIDE, AND CALCIUM CHLORIDE: 600; 310; 30; 20 INJECTION, SOLUTION INTRAVENOUS at 08:22

## 2019-03-16 RX ADMIN — Medication 5 ML: at 21:10

## 2019-03-16 RX ADMIN — PROPOFOL 20 MG: 10 INJECTION, EMULSION INTRAVENOUS at 09:12

## 2019-03-16 RX ADMIN — PROPOFOL 20 MG: 10 INJECTION, EMULSION INTRAVENOUS at 08:57

## 2019-03-16 RX ADMIN — Medication 10 ML: at 07:00

## 2019-03-16 RX ADMIN — PROPOFOL 20 MG: 10 INJECTION, EMULSION INTRAVENOUS at 09:22

## 2019-03-16 RX ADMIN — HYDRALAZINE HYDROCHLORIDE 10 MG: 20 INJECTION INTRAMUSCULAR; INTRAVENOUS at 21:07

## 2019-03-16 RX ADMIN — PROPOFOL 50 MG: 10 INJECTION, EMULSION INTRAVENOUS at 08:32

## 2019-03-16 RX ADMIN — PROPOFOL 50 MG: 10 INJECTION, EMULSION INTRAVENOUS at 08:28

## 2019-03-16 RX ADMIN — FAMOTIDINE 20 MG: 10 INJECTION, SOLUTION INTRAVENOUS at 07:58

## 2019-03-16 RX ADMIN — PROPOFOL 20 MG: 10 INJECTION, EMULSION INTRAVENOUS at 08:36

## 2019-03-16 RX ADMIN — SIMVASTATIN 80 MG: 40 TABLET, FILM COATED ORAL at 11:28

## 2019-03-16 RX ADMIN — LIDOCAINE HYDROCHLORIDE 40 MG: 20 INJECTION, SOLUTION EPIDURAL; INFILTRATION; INTRACAUDAL; PERINEURAL at 08:28

## 2019-03-16 RX ADMIN — PROPOFOL 20 MG: 10 INJECTION, EMULSION INTRAVENOUS at 08:40

## 2019-03-16 RX ADMIN — SODIUM CHLORIDE, SODIUM LACTATE, POTASSIUM CHLORIDE, AND CALCIUM CHLORIDE 75 ML/HR: 600; 310; 30; 20 INJECTION, SOLUTION INTRAVENOUS at 06:58

## 2019-03-16 RX ADMIN — PROPOFOL 20 MG: 10 INJECTION, EMULSION INTRAVENOUS at 09:18

## 2019-03-16 RX ADMIN — PROPOFOL 20 MG: 10 INJECTION, EMULSION INTRAVENOUS at 09:05

## 2019-03-16 NOTE — ROUTINE PROCESS
Bedside and Verbal shift change report given to GREG Resendiz (oncoming nurse) by Pepper Barbour   (offgoing nurse). Report included the following information SBAR, Intake/Output, MAR, Recent Results and Cardiac Rhythm Sinus Rhythm.

## 2019-03-16 NOTE — ROUTINE PROCESS
Bedside shift change report given to Sirisha Coughlin (oncoming nurse) by Krystle Dee (offgoing nurse). Report included the following information SBAR, Kardex, Intake/Output, MAR and Cardiac Rhythm NSR.

## 2019-03-16 NOTE — PROGRESS NOTES
Problem: Falls - Risk of  Goal: *Absence of Falls  Document Fred Fall Risk and appropriate interventions in the flowsheet.   Outcome: Progressing Towards Goal  Fall Risk Interventions:  Mobility Interventions: Bed/chair exit alarm, Communicate number of staff needed for ambulation/transfer, OT consult for ADLs, PT Consult for mobility concerns    Mentation Interventions: Adequate sleep, hydration, pain control, Bed/chair exit alarm, Door open when patient unattended, More frequent rounding    Medication Interventions: Bed/chair exit alarm, Evaluate medications/consider consulting pharmacy    Elimination Interventions: Bed/chair exit alarm, Call light in reach, Patient to call for help with toileting needs, Toileting schedule/hourly rounds

## 2019-03-16 NOTE — ANESTHESIA PREPROCEDURE EVALUATION
Anesthetic History   No history of anesthetic complications            Review of Systems / Medical History  Patient summary reviewed and pertinent labs reviewed    Pulmonary                   Neuro/Psych         Dementia     Cardiovascular    Hypertension          CAD         GI/Hepatic/Renal                Endo/Other    Diabetes: type 2    Arthritis     Other Findings              Physical Exam    Airway  Mallampati: III  TM Distance: 4 - 6 cm  Neck ROM: decreased range of motion   Mouth opening: Diminished (comment)     Cardiovascular    Rhythm: regular  Rate: normal         Dental    Dentition: Poor dentition     Pulmonary  Breath sounds clear to auscultation               Abdominal  GI exam deferred       Other Findings            Anesthetic Plan    ASA: 3  Anesthesia type: MAC            Anesthetic plan and risks discussed with: Family

## 2019-03-16 NOTE — PERIOP NOTES
TRANSFER - OUT REPORT:    Verbal report given to DEIDRA [facility] RN on Belle Ascencio  being transferred to [facility] for routine post - op       Report consisted of patients Situation, Background, Assessment and   Recommendations(SBAR). Information from the following report(s) SBAR, Procedure Summary and MAR was reviewed with the receiving nurse. Lines:   Peripheral IV 03/13/19 Right Antecubital (Active)   Site Assessment Drainage (comment) 3/15/2019 11:30 PM   Phlebitis Assessment 0 3/15/2019 11:30 PM   Infiltration Assessment 0 3/15/2019 11:30 PM   Dressing Status Old drainage 3/15/2019 11:30 PM   Dressing Type Transparent;Tape 3/15/2019 11:30 PM   Hub Color/Line Status Pink;Capped;Flushed 3/15/2019 11:30 PM        Opportunity for questions and clarification was provided.       Patient transported with:   Registered Nurse

## 2019-03-16 NOTE — PROGRESS NOTES
Discharge planning:    Patient's referral has been sent to [facility]. They will have to start the [facility] authorization process. It will likely be Monday before they can start that process, due to the weekend. Care manager will continue to closely monitor for discharge planning. Sabina Avery MSW  Care Manager  Pager#: [phone]

## 2019-03-16 NOTE — PERIOP NOTES
TRANSFER - IN REPORT:    Verbal report received from Sathya Ann RN on Jenna Arcos  being received from 212 for ordered procedure      Report consisted of patients Situation, Background, Assessment and   Recommendations(SBAR). Information from the following report(s) SBAR was reviewed with the receiving nurse. Opportunity for questions and clarification was provided. Assessment completed upon patients arrival to unit and care assumed.

## 2019-03-16 NOTE — PROCEDURES
[email or web address]  [facility], [address]    Colonoscopy Procedure Note                 Indications:    Hematochezia/melena     :  Roland Bass MD    Referring Provider: Donte Sewell MD    Sedation:  MAC anesthesia    Procedure Details:  After informed consent was obtained with all risks and benefits of procedure explained and preoperative exam completed, the patient was taken to the endoscopy suite and placed in the left lateral decubitus position. Upon sequential sedation as per above, a digital rectal exam was performed and was normal.  The Olympus videocolonoscope was inserted in the rectum and carefully advanced to the cecum, which was identified by the ileocecal valve and appendiceal orifice, terminal ileum. The quality of preparation was inadequate. The colonoscope was slowly withdrawn with careful evaluation between folds. Retroflexion in the rectum was performed and was normal.    Findings:   Rectum: Moderate sized internal hemorrhoids noted-no bleeding stigmata. 2 cm stercoral ulcer in rectum noted with clean base  Sigmoid: Severe diverticulosis  Descending Colon: Severe diverticulosis  Transverse Colon: Normal, but prep was suboptmial  Ascending Colon: 2 polyps seen 8 mm or so, removed by cold snare, few scattered diverticula; suboptimal prep  Cecum: 2 polyps-4 to 6 mm or so-removed by cold snare  Terminal Ileum: normal    Interventions:  As above    Specimen Removed:    ID Type Source Tests Collected by Time Destination   1 : Cecum polyps x2 and Ascending colon polyps x 2 Preservative Colon  Catrina Parsons MD 3/16/2019 3113 Pathology       Complications: None. EBL:  None. Impression: Suspect the bleeding is from the stercoral ulcer. Self limited diverticular bleed is also possible. Prep was suboptimal, so polyps, or a flat mass lesion could have been missed, but thought to be less likely.  No blood seen in the colon, stool was brown. Recommendations:    1. Resume diet  2. Will need life long bowel regimen to make sure he does not get constipated. 3. Follow path. 4. Can be discharged once tolerating diet and no further bleeding noted.    5. Given age and co-morbidities, would recommend against further colonoscopy           Agatha Ortiz MD  3/16/2019  9:38 AM

## 2019-03-16 NOTE — ROUTINE PROCESS
2315- shift report received from Amanda Jones, [facility].  7581- Patient has 2 large cups of miralax left to drink. Patient with thickened drinks due to swallowing issues. Patient spoon fed 240 ml of miralax mixed with gatorade. Patient unable to tolerate any more at present time. 0001- Patient spoon fed another 240 ml of miralax with gatorade. Patient unable to tolerate more at present time. 0030- patient spoon fed 360 ml of miralax with gatorade to finish his prep for colonoscopy in the am.  0300- patient checked for incontinence. Patient has not had any bowel movement since start of shift. Patient incontinent of urine. Cleaned patient. 0430- vital signs completed. Patient states \" I had a banerjee banerjee\"  Patient incontinent of clear light yellow liquid. When patient asked by nurse if he had a bowel movement or urine. Patient stated, \"I can't tell which I had\"  9306- patient had medium sized brownish-red soft stool. Cleaned patient. 1559- left message with GI answering service to have MD call nurse. 3605- awaiting return call, left message with  GI answering service to have MD call nurse. 2826- spoke with RN in colonoscopy OR and informed about the ineffective prep.  0620- Dr. Amada Hedrick called and informed about ineffective prep. Order for soap suds enema received.   5735- patient having soft stools. 0627-Soap suds enema started. 0511- 2 soap suds enemas given. Patient having liquid brown stools at completion of enema. Cleaned patient. 7418- CHG bath given. Linens and gown changed. 4356- Lactated ringers started at 75 ml/hr via IV pump.  0704- accucheck completed. 26- Patient incontinent of additional liquid stool. Incontinence care completed. 8167- to OR via bed.  9713- patients family brought to [address] waiting room. OR staff aware. 9062- Telemetry box returned to room.

## 2019-03-16 NOTE — PROGRESS NOTES
Problem: Pressure Injury - Risk of  Goal: *Prevention of pressure injury  Document Feroz Scale and appropriate interventions in the flowsheet. Outcome: Progressing Towards Goal  Pressure Injury Interventions:  Sensory Interventions: Check visual cues for pain, Keep linens dry and wrinkle-free, Minimize linen layers, Pressure redistribution bed/mattress (bed type), Turn and reposition approx.  every two hours (pillows and wedges if needed)    Moisture Interventions: Absorbent underpads, Check for incontinence Q2 hours and as needed, Minimize layers, Maintain skin hydration (lotion/cream)    Activity Interventions: Pressure redistribution bed/mattress(bed type), PT/OT evaluation    Mobility Interventions: HOB 30 degrees or less, Pressure redistribution bed/mattress (bed type), PT/OT evaluation    Nutrition Interventions: Document food/fluid/supplement intake    Friction and Shear Interventions: HOB 30 degrees or less, Minimize layers

## 2019-03-16 NOTE — ANESTHESIA POSTPROCEDURE EVALUATION
Procedure(s):  COLONOSCOPY w/ polypectomies.     Anesthesia Post Evaluation      Multimodal analgesia: multimodal analgesia used between 6 hours prior to anesthesia start to PACU discharge  Patient location during evaluation: bedside  Patient participation: complete - patient participated  Level of consciousness: awake  Pain management: adequate  Airway patency: patent  Anesthetic complications: no  Cardiovascular status: stable  Respiratory status: acceptable  Hydration status: acceptable  Post anesthesia nausea and vomiting:  controlled      Visit Vitals  /63   Pulse 61   Temp 36.5 °C (97.7 °F)   Resp 13   Wt 95.3 kg (210 lb 3.2 oz)   SpO2 99%   BMI 37.24 kg/m²

## 2019-03-16 NOTE — PROGRESS NOTES
[facility] Hospitalist Group  Progress Note    Patient: Ashok Geronimo Age: 80 y.o. : 3/11/1929 MR#: [id number] SSN: [id number]  Date/Time: 3/16/2019    Subjective:     Patient is alert and awake and some chronic disorientation   Family at bed side   No cp  No sob  No NVD     Patient had sidney blood in stool today and yesterday         Assessment/Plan:     -GI Bleed, ?diverticular  - acute blood loss anemia   - HTN  - DM2  - Dementia  - h/o Prostate Cancer    PLAN    - GI bleed - lower GI bleed , stercoral ulcer   - now bleeding stopped   - Avoid constipation   - D/W CC   - patient will be transferred back to SNF once he is approved by [employer]     - Called patient's son Paulino Mcrae @[phone]     Case discussed with:  [x]Patient  []Family  [x]Nursing  []Case Management  DVT Prophylaxis:  []Lovenox  []Hep SQ  []SCDs  []Coumadin   []On Heparin gtt    Objective:   VS:   Visit Vitals  /71 (BP 1 Location: Left arm, BP Patient Position: At rest)   Pulse 68   Temp 96.4 °F (35.8 °C)   Resp 16   Ht 5' 8\" (1.727 m)   Wt 95.3 kg (210 lb 3.2 oz)   SpO2 93%   BMI 31.96 kg/m²      Tmax/24hrs: Temp (24hrs), Av.5 °F (36.4 °C), Min:96.4 °F (35.8 °C), Max:98.3 °F (36.8 °C)    Input/Output:     Intake/Output Summary (Last 24 hours) at 3/16/2019 1307  Last data filed at 3/16/2019 0929  Gross per 24 hour   Intake 1090 ml   Output    Net 1090 ml       General:  Awake, follows commands  Cardiovascular:  S1S2+, RRR  Pulmonary:  CTA b/l  GI:  Soft, BS+, NT, ND  Extremities:  trace edema      Labs:    Recent Results (from the past 24 hour(s))   HGB & HCT    Collection Time: 03/15/19  8:11 PM   Result Value Ref Range    HGB 7.8 (L) 13.0 - 16.0 g/dL    HCT 24.9 (L) 36.0 - 48.0 %   PROTHROMBIN TIME + INR    Collection Time: 19  2:21 AM   Result Value Ref Range    Prothrombin time 14.3 11.5 - 15.2 sec    INR 1.1 0.8 - 1.2     CBC WITH AUTOMATED DIFF    Collection Time: 19  2:21 AM Result Value Ref Range    WBC 8.2 4.6 - 13.2 K/uL    RBC 2.52 (L) 4.70 - 5.50 M/uL    HGB 7.4 (L) 13.0 - 16.0 g/dL    HCT 23.7 (L) 36.0 - 48.0 %    MCV 94.0 74.0 - 97.0 FL    MCH 29.4 24.0 - 34.0 PG    MCHC 31.2 31.0 - 37.0 g/dL    RDW 14.1 11.6 - 14.5 %    PLATELET 410 576 - 946 K/uL    MPV 11.2 9.2 - 11.8 FL    NEUTROPHILS 68 40 - 73 %    LYMPHOCYTES 15 (L) 21 - 52 %    MONOCYTES 13 (H) 3 - 10 %    EOSINOPHILS 3 0 - 5 %    BASOPHILS 1 0 - 2 %    ABS. NEUTROPHILS 5.6 1.8 - 8.0 K/UL    ABS. LYMPHOCYTES 1.3 0.9 - 3.6 K/UL    ABS. MONOCYTES 1.1 0.05 - 1.2 K/UL    ABS. EOSINOPHILS 0.2 0.0 - 0.4 K/UL    ABS.  BASOPHILS 0.0 0.0 - 0.1 K/UL    DF AUTOMATED     METABOLIC PANEL, BASIC    Collection Time: 03/16/19  2:21 AM   Result Value Ref Range    Sodium 145 136 - 145 mmol/L    Potassium 4.0 3.5 - 5.5 mmol/L    Chloride 113 (H) 100 - 108 mmol/L    CO2 28 21 - 32 mmol/L    Anion gap 4 3.0 - 18 mmol/L    Glucose 154 (H) 74 - 99 mg/dL    BUN 15 7.0 - 18 MG/DL    Creatinine 1.34 (H) 0.6 - 1.3 MG/DL    BUN/Creatinine ratio 11 (L) 12 - 20      GFR est AA >60 >60 ml/min/1.73m2    GFR est non-AA 50 (L) >60 ml/min/1.73m2    Calcium 8.7 8.5 - 10.1 MG/DL   GLUCOSE, POC    Collection Time: 03/16/19  7:04 AM   Result Value Ref Range    Glucose (POC) 119 (H) 70 - 110 mg/dL     Additional Data Reviewed:      Signed By: Richard Ribera MD     March 16, 2019 12:21 PM

## 2019-03-16 NOTE — H&P
History and physical has been reviewed. The patient has been examined. There have been no significant clinical changes since the completion of the originally dated History and Physical.    Chencho Felder MD  [facility].  [email or web address]  Phone: [phone]  Cell: [phone]  [email or web address]

## 2019-03-17 LAB
ANION GAP SERPL CALC-SCNC: 6 MMOL/L (ref 3–18)
BASOPHILS # BLD: 0.1 K/UL (ref 0–0.1)
BASOPHILS NFR BLD: 1 % (ref 0–2)
BUN SERPL-MCNC: 9 MG/DL (ref 7–18)
BUN/CREAT SERPL: 8 (ref 12–20)
CALCIUM SERPL-MCNC: 8.7 MG/DL (ref 8.5–10.1)
CHLORIDE SERPL-SCNC: 109 MMOL/L (ref 100–108)
CO2 SERPL-SCNC: 25 MMOL/L (ref 21–32)
CREAT SERPL-MCNC: 1.13 MG/DL (ref 0.6–1.3)
DIFFERENTIAL METHOD BLD: ABNORMAL
EOSINOPHIL # BLD: 0.2 K/UL (ref 0–0.4)
EOSINOPHIL NFR BLD: 2 % (ref 0–5)
ERYTHROCYTE [DISTWIDTH] IN BLOOD BY AUTOMATED COUNT: 14 % (ref 11.6–14.5)
GLUCOSE SERPL-MCNC: 113 MG/DL (ref 74–99)
HCT VFR BLD AUTO: 23.1 % (ref 36–48)
HCT VFR BLD AUTO: 24.4 % (ref 36–48)
HGB BLD-MCNC: 7.3 G/DL (ref 13–16)
HGB BLD-MCNC: 7.8 G/DL (ref 13–16)
LYMPHOCYTES # BLD: 1.4 K/UL (ref 0.9–3.6)
LYMPHOCYTES NFR BLD: 13 % (ref 21–52)
MCH RBC QN AUTO: 29.3 PG (ref 24–34)
MCHC RBC AUTO-ENTMCNC: 31.6 G/DL (ref 31–37)
MCV RBC AUTO: 92.8 FL (ref 74–97)
MONOCYTES # BLD: 1.4 K/UL (ref 0.05–1.2)
MONOCYTES NFR BLD: 13 % (ref 3–10)
NEUTS SEG # BLD: 7.8 K/UL (ref 1.8–8)
NEUTS SEG NFR BLD: 71 % (ref 40–73)
PLATELET # BLD AUTO: 238 K/UL (ref 135–420)
PMV BLD AUTO: 10.7 FL (ref 9.2–11.8)
POTASSIUM SERPL-SCNC: 3.7 MMOL/L (ref 3.5–5.5)
RBC # BLD AUTO: 2.49 M/UL (ref 4.7–5.5)
SODIUM SERPL-SCNC: 140 MMOL/L (ref 136–145)
WBC # BLD AUTO: 10.9 K/UL (ref 4.6–13.2)

## 2019-03-17 PROCEDURE — 36415 COLL VENOUS BLD VENIPUNCTURE: CPT

## 2019-03-17 PROCEDURE — 85018 HEMOGLOBIN: CPT

## 2019-03-17 PROCEDURE — 65660000000 HC RM CCU STEPDOWN

## 2019-03-17 PROCEDURE — 85025 COMPLETE CBC W/AUTO DIFF WBC: CPT

## 2019-03-17 PROCEDURE — 80048 BASIC METABOLIC PNL TOTAL CA: CPT

## 2019-03-17 PROCEDURE — 74011250636 HC RX REV CODE- 250/636: Performed by: INTERNAL MEDICINE

## 2019-03-17 RX ADMIN — Medication 10 ML: at 14:00

## 2019-03-17 RX ADMIN — HYDRALAZINE HYDROCHLORIDE 10 MG: 20 INJECTION INTRAMUSCULAR; INTRAVENOUS at 21:58

## 2019-03-17 RX ADMIN — Medication 10 ML: at 05:43

## 2019-03-17 RX ADMIN — HYDRALAZINE HYDROCHLORIDE 10 MG: 20 INJECTION INTRAMUSCULAR; INTRAVENOUS at 05:43

## 2019-03-17 RX ADMIN — Medication 5 ML: at 23:19

## 2019-03-17 NOTE — PROGRESS NOTES
Speech Pathology Note      1028:  Noted new order for swallow eval, chart reviewed. Pt currently NPO, failed nursing swallow screen as pt with h/o dysphagia. Upon deeper chart review, Pt was admitted to [facility] 3/1- 3/8/1919 due to [address]. During hospitalization, pt participated in [facility] with the following results noted by radiology:   \"FINDINGS:    Thin barium: Drinking from a cup: Oral and swallow delay with premature spillage. Silent aspiration. Nectar consistency: Drinking from a cup: Oral and swallow delay with premature spillage. Penetration to the vocal cords without evidence of aspiration. Honey consistency: Not given    Pudding: Oral and swallow delay. Solid: Oral and swallow delay. \"    Pt was placed on puree solids with nectar thick liquids. Unable to find SLP notes related to above. Given h/o silent aspiration, would rec MBS prior to initiation of any po trials/ feeds as that is the only way to determine silent aspiration and current swallow function. Orders written. In- house SLP to complete MBS next business date.     Thank you for this referral,  Daly Martin MS, CCC/SLP  Speech- Language Pathologist

## 2019-03-17 NOTE — PROGRESS NOTES
[facility] Hospitalist Group  Progress Note    Patient: Enrique Patel Age: 80 y.o. : 3/11/1929 MR#: [id number] SSN: [id number]  Date/Time: 3/17/2019    Subjective:     Patient has no more bleed   Had some difficulty in eating yesterday   No cp   noSOB  No NVD         Assessment/Plan:     - Dysphagia - new issue - MBS in AM by SLP   -GI Bleed, ?diverticular  - acute blood loss anemia   - HTN  - DM2  - Dementia  - h/o Prostate Cancer  - Chronic constipation and food impaction     PLAN    - GI bleed - lower GI bleed , stercoral ulcer - stable - need to avoid development of constipation   - Speech work up should finish by tomorrow and then placement to SNF       - patient will be transferred back to SNF once he is approved by [employer]     - Called patient's son Emeterio Guzman @[phone]     Case discussed with:  [x]Patient  []Family  [x]Nursing  []Case Management  DVT Prophylaxis:  []Lovenox  []Hep SQ  []SCDs  []Coumadin   []On Heparin gtt    Objective:   VS:   Visit Vitals  /64 (BP 1 Location: Right arm, BP Patient Position: At rest)   Pulse 77   Temp 96.9 °F (36.1 °C)   Resp 16   Ht 5' 8\" (1.727 m)   Wt 95.3 kg (210 lb 3.2 oz)   SpO2 98%   BMI 31.96 kg/m²      Tmax/24hrs: Temp (24hrs), Av.3 °F (36.3 °C), Min:96.9 °F (36.1 °C), Max:97.7 °F (36.5 °C)    Input/Output:     Intake/Output Summary (Last 24 hours) at 3/17/2019 1322  Last data filed at 3/17/2019 1040  Gross per 24 hour   Intake    Output 350 ml   Net -350 ml       General:  Awake, follows commands  Cardiovascular:  S1S2+, RRR  Pulmonary:  CTA b/l  GI:  Soft, BS+, NT, ND  Extremities:  trace edema      Labs:    Recent Results (from the past 24 hour(s))   HGB & HCT    Collection Time: 19  8:47 PM   Result Value Ref Range    HGB 7.6 (L) 13.0 - 16.0 g/dL    HCT 24.1 (L) 36.0 - 48.0 %   CBC WITH AUTOMATED DIFF    Collection Time: 19  6:43 AM   Result Value Ref Range    WBC 10.9 4.6 - 13.2 K/uL    RBC 2.49 (L) 4.70 - 5.50 M/uL    HGB 7.3 (L) 13.0 - 16.0 g/dL    HCT 23.1 (L) 36.0 - 48.0 %    MCV 92.8 74.0 - 97.0 FL    MCH 29.3 24.0 - 34.0 PG    MCHC 31.6 31.0 - 37.0 g/dL    RDW 14.0 11.6 - 14.5 %    PLATELET 108 468 - 976 K/uL    MPV 10.7 9.2 - 11.8 FL    NEUTROPHILS 71 40 - 73 %    LYMPHOCYTES 13 (L) 21 - 52 %    MONOCYTES 13 (H) 3 - 10 %    EOSINOPHILS 2 0 - 5 %    BASOPHILS 1 0 - 2 %    ABS. NEUTROPHILS 7.8 1.8 - 8.0 K/UL    ABS. LYMPHOCYTES 1.4 0.9 - 3.6 K/UL    ABS. MONOCYTES 1.4 (H) 0.05 - 1.2 K/UL    ABS. EOSINOPHILS 0.2 0.0 - 0.4 K/UL    ABS.  BASOPHILS 0.1 0.0 - 0.1 K/UL    DF AUTOMATED     METABOLIC PANEL, BASIC    Collection Time: 03/17/19  6:43 AM   Result Value Ref Range    Sodium 140 136 - 145 mmol/L    Potassium 3.7 3.5 - 5.5 mmol/L    Chloride 109 (H) 100 - 108 mmol/L    CO2 25 21 - 32 mmol/L    Anion gap 6 3.0 - 18 mmol/L    Glucose 113 (H) 74 - 99 mg/dL    BUN 9 7.0 - 18 MG/DL    Creatinine 1.13 0.6 - 1.3 MG/DL    BUN/Creatinine ratio 8 (L) 12 - 20      GFR est AA >60 >60 ml/min/1.73m2    GFR est non-AA >60 >60 ml/min/1.73m2    Calcium 8.7 8.5 - 10.1 MG/DL     Additional Data Reviewed:      Signed By: Placido Alcaraz MD     March 17, 2019 12:21 PM

## 2019-03-17 NOTE — PROGRESS NOTES
[email or web address]  [phone]    Gastroenterology follow up-Progress note    Impression:  1. Hematochezia with blood loss anemia: s/p colonoscopy-suspected this is from stercoral ulcer-diverticular bleed is also possible, but less likely. Mass lesion/AVM is also less likely. No bleeding today, Hgb stable. 2. Chronic constipation  3. Dementia  4. Colon polyps-s/p polypectomy  5. H/o prostate cancer. Plan:  1. [id number] for discharge from my perspective  2. Needs chronic bowel regimen to make sure he does not get constipated-started on lactulose, can be up titrated as needed. 3. Watch for rebleeding  4. Can resume ASA/Antiplatelets as needed in 3-4 days. 5. Avoid Non aspirin NSAID's    Will sign off-Thank you for this consultation and the opportunity to participate in the care of this patient. Please do not hesitate to call with any questions or concerns, or should event occur that may necessitate additional GI evaluation. Chief Complaint: f/u GI bleeding    Subjective:  No bleeding noted. ROS: Denies any fevers, chills, rash.      Eyes: conjunctiva normal, EOM normal   Neck: ROM normal, supple and trachea normal   Cardiovascular: heart normal, intact distal pulses, normal rate and regular rhythm   Pulmonary/Chest Wall: breath sounds normal and effort normal   Abdominal: appearance normal, bowel sounds normal and soft, non-acute, non-tender     Patient Active Problem List   Diagnosis Code    Malignant neoplasm of prostate ([facility] [address] C61    Coronary atherosclerosis of native coronary artery I25.10    Other and unspecified hyperlipidemia E78.5    GI bleed K92.2    Stercoral ulcer of rectum K62.6    Internal hemorrhoids K64.8    Diverticulosis K57.90         Visit Vitals  /64 (BP 1 Location: Right arm, BP Patient Position: At rest)   Pulse 77   Temp 96.9 °F (36.1 °C)   Resp 16   Ht 5' 8\" (1.727 m)   Wt 95.3 kg (210 lb 3.2 oz)   SpO2 98%   BMI 31.96 kg/m²           Intake/Output Summary (Last 24 hours) at 3/17/2019 1246  Last data filed at 3/17/2019 1040  Gross per 24 hour   Intake    Output 350 ml   Net -350 ml       CBC w/Diff    Lab Results   Component Value Date/Time    WBC 10.9 03/17/2019 06:43 AM    RBC 2.49 (L) 03/17/2019 06:43 AM    HGB 7.3 (L) 03/17/2019 06:43 AM    HCT 23.1 (L) 03/17/2019 06:43 AM    MCV 92.8 03/17/2019 06:43 AM    MCH 29.3 03/17/2019 06:43 AM    MCHC 31.6 03/17/2019 06:43 AM    RDW 14.0 03/17/2019 06:43 AM     03/17/2019 06:43 AM    Lab Results   Component Value Date/Time    GRANS 71 03/17/2019 06:43 AM    LYMPH 13 (L) 03/17/2019 06:43 AM    EOS 2 03/17/2019 06:43 AM    BASOS 1 03/17/2019 06:43 AM      Basic Metabolic Profile   Recent Labs     03/17/19  0643  03/15/19  0630      < > 143   K 3.7   < > 4.3   *   < > 112*   CO2 25   < > 25   BUN 9   < > 19*   CA 8.7   < > 8.9   MG  --   --  2.3    < > = values in this interval not displayed. Hepatic Function    No results found for: ALB, TP, AP No results found for: SGOT, GPT, TBIL       Coags   Recent Labs     03/16/19  0221   PTP 14.3   INR 1.1               Carmen Hargrove MD    [facility]. [email or web address]  Phone: [phone]  Pager: [phone]

## 2019-03-17 NOTE — ROUTINE PROCESS
Report received from [facility]. Patient is alert in bed, no distress noted. Family is at the bedside, continue to monitor. 2315  B/P elevated earlier 194/72. Hydralazine given as ordered. B/P now 159/69. Continue to monitor. 0730  Bedside, Verbal and Written shift change report given to Alexys Jaquez (oncoming nurse) by Mariela Mitchell (offgoing nurse). Report included the following information SBAR, Kardex, MAR and Accordion.

## 2019-03-18 ENCOUNTER — APPOINTMENT (OUTPATIENT)
Dept: GENERAL RADIOLOGY | Age: 84
DRG: 378 | End: 2019-03-18
Attending: INTERNAL MEDICINE
Payer: MEDICARE

## 2019-03-18 LAB
ANION GAP SERPL CALC-SCNC: 5 MMOL/L (ref 3–18)
BASOPHILS # BLD: 0 K/UL (ref 0–0.1)
BASOPHILS NFR BLD: 1 % (ref 0–2)
BUN SERPL-MCNC: 7 MG/DL (ref 7–18)
BUN/CREAT SERPL: 6 (ref 12–20)
CALCIUM SERPL-MCNC: 9 MG/DL (ref 8.5–10.1)
CHLORIDE SERPL-SCNC: 111 MMOL/L (ref 100–108)
CO2 SERPL-SCNC: 26 MMOL/L (ref 21–32)
CREAT SERPL-MCNC: 1.25 MG/DL (ref 0.6–1.3)
DIFFERENTIAL METHOD BLD: ABNORMAL
EOSINOPHIL # BLD: 0.2 K/UL (ref 0–0.4)
EOSINOPHIL NFR BLD: 3 % (ref 0–5)
ERYTHROCYTE [DISTWIDTH] IN BLOOD BY AUTOMATED COUNT: 14.6 % (ref 11.6–14.5)
GLUCOSE SERPL-MCNC: 99 MG/DL (ref 74–99)
HCT VFR BLD AUTO: 24.3 % (ref 36–48)
HCT VFR BLD AUTO: 25.2 % (ref 36–48)
HCT VFR BLD AUTO: 26 % (ref 36–48)
HGB BLD-MCNC: 7.7 G/DL (ref 13–16)
HGB BLD-MCNC: 7.9 G/DL (ref 13–16)
HGB BLD-MCNC: 8.2 G/DL (ref 13–16)
LYMPHOCYTES # BLD: 1.8 K/UL (ref 0.9–3.6)
LYMPHOCYTES NFR BLD: 22 % (ref 21–52)
MCH RBC QN AUTO: 29.7 PG (ref 24–34)
MCHC RBC AUTO-ENTMCNC: 31.7 G/DL (ref 31–37)
MCV RBC AUTO: 93.8 FL (ref 74–97)
MONOCYTES # BLD: 1.3 K/UL (ref 0.05–1.2)
MONOCYTES NFR BLD: 15 % (ref 3–10)
NEUTS SEG # BLD: 5 K/UL (ref 1.8–8)
NEUTS SEG NFR BLD: 59 % (ref 40–73)
PLATELET # BLD AUTO: 275 K/UL (ref 135–420)
PMV BLD AUTO: 10.5 FL (ref 9.2–11.8)
POTASSIUM SERPL-SCNC: 3.8 MMOL/L (ref 3.5–5.5)
RBC # BLD AUTO: 2.59 M/UL (ref 4.7–5.5)
SODIUM SERPL-SCNC: 142 MMOL/L (ref 136–145)
WBC # BLD AUTO: 8.3 K/UL (ref 4.6–13.2)

## 2019-03-18 PROCEDURE — 65660000000 HC RM CCU STEPDOWN

## 2019-03-18 PROCEDURE — 92526 ORAL FUNCTION THERAPY: CPT

## 2019-03-18 PROCEDURE — 85025 COMPLETE CBC W/AUTO DIFF WBC: CPT

## 2019-03-18 PROCEDURE — 97535 SELF CARE MNGMENT TRAINING: CPT

## 2019-03-18 PROCEDURE — 36415 COLL VENOUS BLD VENIPUNCTURE: CPT

## 2019-03-18 PROCEDURE — 74011250636 HC RX REV CODE- 250/636: Performed by: INTERNAL MEDICINE

## 2019-03-18 PROCEDURE — 97165 OT EVAL LOW COMPLEX 30 MIN: CPT

## 2019-03-18 PROCEDURE — 85018 HEMOGLOBIN: CPT

## 2019-03-18 PROCEDURE — 74230 X-RAY XM SWLNG FUNCJ C+: CPT

## 2019-03-18 PROCEDURE — 74011250637 HC RX REV CODE- 250/637: Performed by: INTERNAL MEDICINE

## 2019-03-18 PROCEDURE — 74011000255 HC RX REV CODE- 255: Performed by: FAMILY MEDICINE

## 2019-03-18 PROCEDURE — 80048 BASIC METABOLIC PNL TOTAL CA: CPT

## 2019-03-18 PROCEDURE — 92611 MOTION FLUOROSCOPY/SWALLOW: CPT

## 2019-03-18 PROCEDURE — 74011250637 HC RX REV CODE- 250/637: Performed by: FAMILY MEDICINE

## 2019-03-18 RX ORDER — VALSARTAN 160 MG/1
160 TABLET ORAL DAILY
Status: DISCONTINUED | OUTPATIENT
Start: 2019-03-19 | End: 2019-03-21 | Stop reason: HOSPADM

## 2019-03-18 RX ORDER — ALFUZOSIN HYDROCHLORIDE 10 MG/1
10 TABLET, EXTENDED RELEASE ORAL
Status: DISCONTINUED | OUTPATIENT
Start: 2019-03-18 | End: 2019-03-21 | Stop reason: HOSPADM

## 2019-03-18 RX ORDER — LORATADINE 10 MG/1
10 TABLET ORAL DAILY
Status: DISCONTINUED | OUTPATIENT
Start: 2019-03-18 | End: 2019-03-21 | Stop reason: HOSPADM

## 2019-03-18 RX ORDER — AMLODIPINE BESYLATE 5 MG/1
5 TABLET ORAL DAILY
Status: DISCONTINUED | OUTPATIENT
Start: 2019-03-19 | End: 2019-03-21 | Stop reason: HOSPADM

## 2019-03-18 RX ORDER — LANOLIN ALCOHOL/MO/W.PET/CERES
325 CREAM (GRAM) TOPICAL 2 TIMES DAILY
Status: DISCONTINUED | OUTPATIENT
Start: 2019-03-18 | End: 2019-03-21 | Stop reason: HOSPADM

## 2019-03-18 RX ORDER — HYDROCHLOROTHIAZIDE 12.5 MG/1
12.5 CAPSULE ORAL DAILY
Status: DISCONTINUED | OUTPATIENT
Start: 2019-03-19 | End: 2019-03-21 | Stop reason: HOSPADM

## 2019-03-18 RX ORDER — MONTELUKAST SODIUM 10 MG/1
10 TABLET ORAL
Status: DISCONTINUED | OUTPATIENT
Start: 2019-03-18 | End: 2019-03-21 | Stop reason: HOSPADM

## 2019-03-18 RX ORDER — RIVASTIGMINE TARTRATE 1.5 MG/1
1.5 CAPSULE ORAL 2 TIMES DAILY WITH MEALS
Status: DISCONTINUED | OUTPATIENT
Start: 2019-03-18 | End: 2019-03-21 | Stop reason: HOSPADM

## 2019-03-18 RX ORDER — METOPROLOL SUCCINATE 50 MG/1
50 TABLET, EXTENDED RELEASE ORAL DAILY
Status: DISCONTINUED | OUTPATIENT
Start: 2019-03-19 | End: 2019-03-21 | Stop reason: HOSPADM

## 2019-03-18 RX ORDER — CLONIDINE 0.2 MG/24H
1 PATCH, EXTENDED RELEASE TRANSDERMAL
Status: DISCONTINUED | OUTPATIENT
Start: 2019-03-18 | End: 2019-03-21 | Stop reason: HOSPADM

## 2019-03-18 RX ADMIN — Medication 10 ML: at 22:16

## 2019-03-18 RX ADMIN — LACTULOSE 30 G: 20 SOLUTION ORAL at 17:38

## 2019-03-18 RX ADMIN — LORATADINE 10 MG: 10 TABLET ORAL at 20:39

## 2019-03-18 RX ADMIN — FERROUS SULFATE TAB 325 MG (65 MG ELEMENTAL FE) 325 MG: 325 (65 FE) TAB at 20:39

## 2019-03-18 RX ADMIN — MONTELUKAST SODIUM 10 MG: 10 TABLET, FILM COATED ORAL at 22:16

## 2019-03-18 RX ADMIN — BARIUM SULFATE 700 MG: 700 TABLET ORAL at 13:15

## 2019-03-18 RX ADMIN — BARIUM SULFATE 60 ML: 400 SUSPENSION ORAL at 13:15

## 2019-03-18 RX ADMIN — HYDRALAZINE HYDROCHLORIDE 10 MG: 20 INJECTION INTRAMUSCULAR; INTRAVENOUS at 17:38

## 2019-03-18 RX ADMIN — Medication 10 ML: at 17:38

## 2019-03-18 RX ADMIN — BARIUM SULFATE 75 ML: 400 PASTE ORAL at 13:15

## 2019-03-18 RX ADMIN — BARIUM SULFATE 45 ML: 400 SUSPENSION ORAL at 13:15

## 2019-03-18 RX ADMIN — ALFUZOSIN HYDROCHLORIDE 10 MG: 10 TABLET ORAL at 20:39

## 2019-03-18 RX ADMIN — RIVASTIGMINE TARTRATE 1.5 MG: 1.5 CAPSULE ORAL at 17:38

## 2019-03-18 NOTE — ROUTINE PROCESS
Bedside shift change report given to Millicent Smith (oncoming nurse) by Marilee Crowell RN (offgoing nurse). Report included the following information SBAR, Kardex, Intake/Output, Recent Results and Cardiac Rhythm NSR.

## 2019-03-18 NOTE — PROGRESS NOTES
Problem: Mobility Impaired (Adult and Pediatric)  Goal: *Acute Goals and Plan of Care (Insert Text)  Physical Therapy Goals  Initiated 3/15/2019 and to be accomplished within 7 day(s)  1. Patient will move from supine to sit and sit to supine , scoot up and down and roll side to side in bed with supervision/set-up. 2.  Patient will transfer from bed to chair and chair to bed with supervision/set-up using the least restrictive device. 3.  Patient will perform sit to stand with supervision/set-up. 4.  Patient will ambulate with supervision/set-up for >100 feet with the least restrictive device. 1240 - Attempted to see pt for PT services. Transport team arrived to take pt off unit for X-ray. Will f/u later in day schedule permitting    0204 - 2nd attempt for PT. Pt still off unit. Will f/u at later date.

## 2019-03-18 NOTE — ROUTINE PROCESS
Report received from [facility]. Patient is alert, in bed, no distress noted. Call bell in reach. 2310  B/P elevated earlier 185/76. Hydralazine given IV as ordered. B/P now 165/69. Continue to monitor. 0800 Patient is alert, no distress noted. Bedside, Verbal and Written shift change report given to  Bharath Billingsley (oncoming nurse) by Marylen Mulberry (offgoing nurse). Report included the following information SBAR, Kardex, MAR and Accordion.

## 2019-03-18 NOTE — ROUTINE PROCESS
Bedside and Verbal shift change report given to Vilma (oncoming nurse) by Marcie Sarmiento (offgoing nurse). Report included the following information SBAR and Kardex.

## 2019-03-18 NOTE — PROGRESS NOTES
Problem: Dysphagia (Adult)  Goal: *Acute Goals and Plan of Care (Insert Text)  Patient will:  1. Tolerate PO trials with 0 s/s overt distress in 4/5 trials  2. Utilize compensatory swallow strategies/maneuvers (decrease bite/sip, size/rate, alt. liq/sol) with min cues in 4/5 trials  3. Perform oral-motor/laryngeal exercises to increase oropharyngeal swallow function with min cues  4. Complete an objective swallow study (i.e., MBSS) to assess swallow integrity, r/o aspiration, and determine of safest LRD, min A - met 3/18/19    Rec:     Puree diet with honey-thick liquids  Aspiration precautions  HOB >45 during po intake, remain >30 for 30-45 minutes after po   Small bites/sips; alternate liquid/solid with slow feeding rate   Oral care TID  Meds per pt preference  OP SLP services upon DC from this facility        Speech Pathology Modified barium swallow Study/treatment    Patient: My Newell (89 y.o. male)  Date: 3/18/2019  Primary Diagnosis: GI bleed [K92.2]  Procedure(s) (LRB):  COLONOSCOPY w/ polypectomies (N/A) 2 Days Post-Op   Precautions: aspiration       ASSESSMENT :  Based on the objective data described below, the patient presents with mod-sev pharyngeal dysphagia c/b SILENT aspiration of nectar-thick liquids both during and the after swallow as well as SILENT penetration to chords with mech soft trials. Small sips/bites ineffective at improving airway protection. Pt tolerated puree and honey-thick liquids with no aspiration/penetration events. Premature spillage noted across all PO presentations, improved with puree and honey-thick liquids. Deficits include decreased tongue base retraction, weak/delayed laryngeal elevation/excursion, and impaired pharyngeal motility/sensation. Pt safe for puree diet with honey-thick liquids, aspiration precautions, oral care TID, and meds as tolerated. He will benefit from OP SLP services upon DC from this facility.  ST will continue to follow during this admission. TREATMENT:  Training and education provided related to anatomy/physiology of oropharyngeal swallow, diet recs, significance of thickened liquids, compensatory strategy use (small sips, alternating bites/sips, double swallow per bite/sip) and positioning. Pt able to verbalize understanding; however, suspect limited carryover. Will continue to follow. Patient will benefit from skilled intervention to address the above impairments. Patients rehabilitation potential is considered to be Fair  Factors which may influence rehabilitation potential include:   [x]              Mental ability/status  [x]              Medical condition     PLAN :  Recommendations and Planned Interventions: See above  Frequency/Duration: Patient will be followed by speech-language pathology 1-2 times per day/3-5 days per week to address goals. Discharge Recommendations: Inpatient Rehab, [place] and To Be Determined     SUBJECTIVE:   Patient stated The speech therapist told me to say 'ah,' so I said 'ah. .    OBJECTIVE:     Past Medical History:   Diagnosis Date    Arthritis     Cancer ([facility] [address]     Coronary atherosclerosis of native coronary artery     AS DESCRIBED IN CATH CARDIAC CATH IN 9/07 SHOWS 80% DISTAL LAD AND 40% PROXIMAL LAD DISEASE . HE IS ON MEDICAL TREATMENT.  ASA AND B-BLOCKER 3/10 RENAL ARTERY DUPLEX : NL RT RENAL ARTERY WITH < 60% STENOSIS OF LEFT RENAL ARTERY    Diabetes ([address]     Glaucoma     Hypertension     Impotence of organic origin     Malignant neoplasm of prostate ([address]     Other and unspecified hyperlipidemia      Past Surgical History:   Procedure Laterality Date    COLONOSCOPY N/A 3/16/2019    COLONOSCOPY w/ polypectomies performed by Ramila Mancia MD at [facility]  3/16/2019         Octavia Cano  3/16/2019          Prior Level of Function/Home Situation: [facility]  Home Situation  Home Environment: Assisted living  [place] Residence: One story  Living Alone: No  Support Systems: Assisted living  Patient Expects to be Discharged to[de-identified] Assisted living  Current DME Used/Available at Home: Margie E Sam Hernadez prior to admission: unknown  Current Diet:  Puree with honey-thick   Radiologist:    Film Views: Fluoro;Lateral  Patient Position: 90 in fluoro chair    Trial 1: Trial 2:   Consistency Presented: Nectar thick liquid Consistency Presented: Mechanical soft   How Presented: Self-fed/presented;Cup/sip How Presented: Self-fed/presented;Spoon         Bolus Acceptance: No impairment Bolus Acceptance: No impairment   Bolus Formation/Control: Impaired: Premature spillage Bolus Formation/Control: Impaired: Delayed;Mastication;Premature spillage   Propulsion: Delayed (# of seconds) Propulsion: Delayed (# of seconds); Discoordination   Oral Residue: None Oral Residue: None   Initiation of Swallow: Triggered at pyriform sinus(es) Initiation of Swallow: Triggered at pyriform sinus(es)   Timing: Pooling 1-5 sec Timing: Pooling 1-5 sec   Penetration: None Penetration: During swallow; To cords   Aspiration/Timing: Silent ;From initial swallow;From residual;During; After Aspiration/Timing: No evidence of aspiration(at high risk)   Pharyngeal Clearance: No residue Pharyngeal Clearance: No residue   Attempted Modifications: Small sips and bites Attempted Modifications: Small sips and bites   Effective Modifications: None Effective Modifications: None   Cues for Modifications: Moderate Cues for Modifications: Moderate           Trial 3:   Consistency Presented: Puree; Honey thick liquid;Pill/Tablet   How Presented: Self-fed/presented;Cup/sip;Spoon       Bolus Acceptance: No impairment   Bolus Formation/Control: Impaired: Premature spillage   Propulsion: No impairment   Oral Residue: None   Initiation of Swallow: Triggered at valleculae   Timing: Pooling 1-5 sec   Penetration: None   Aspiration/Timing: No evidence of aspiration   Pharyngeal Clearance: Vallecular residue;Pyriform residue ; Less than 10%   Attempted Modifications: Small sips and bites; Alternate liquids/solids   Effective Modifications: (small sips/bites)   Cues for Modifications: Moderate         Decreased Tongue Base Retraction?: Yes  Laryngeal Elevation: Reduced excursion with laryngeal vestibule gap;Minimal movement of larynx/epiglottis  Aspiration/Penetration Score: 8 (Aspiration-Contrast passes cords/glottis with no effort to eject, ie/silent aspiration)  Pharyngeal Symmetry: Not assessed  Pharyngeal-Esophageal Segment: No impairment  Pharyngeal Dysfunction: Decreased elevation/closure;Decreased strength;Decreased tongue base retraction    Oral Phase Severity: Mild  Pharyngeal Phase Severity: Moderately severe    The severity rating is based on the following outcomes:    8-point Penetration-Aspiration Scale: Score 8  Professional Judgment    PAIN:  Pt reports 0/10 pain or discomfort prior to MBS. Pt reports 0/10 pain or discomfort post MBS. COMMUNICATION/EDUCATION:   [x]  Patient educated regarding MBS results and diet recommendations. [x]  Patient/family have participated as able in goal setting and plan of care.     Thank you for this referral.    Edith Lambert M.S. CCC-SLP/L  Speech-Language Pathologist

## 2019-03-18 NOTE — PROGRESS NOTES
Problem: Self Care Deficits Care Plan (Adult)  Goal: *Acute Goals and Plan of Care (Insert Text)  Occupational Therapy Goals  Initiated 3/18/2019 within 7 day(s). 1.  Patient will perform toileting with supervision/set-up   2. Patient will perform toilet transfer with supervision/set-up. 3.  Patient will perform a functional activity while standing with supervision/set-up for 3-5 minutes. 4.  Patient will participate in upper extremity therapeutic exercise/activities with supervision/set-up for 8 minutes. Outcome: Progressing Towards Goal  Occupational Therapy EVALUATION    Patient: Flakita Stephens (43 y.o. male)  Date: 3/18/2019  Primary Diagnosis: GI bleed [K92.2]  Procedure(s) (LRB):  COLONOSCOPY w/ polypectomies (N/A) 2 Days Post-Op   Precautions: Falls; Aspiration      ASSESSMENT :  Pt cleared to participate in OT evaluation by RN. Upon entering room, pt supine with HOB elevated, alert, and agreeable to therapy session with his granddaughter present. Based on the objective data described below, the patient presents with decreased strength, decreased endurance, decreased functional balance, and decreased functional mobility, affecting his performance in basic self-care/ADL tasks. Pt requires min assist for bed mobility to participate in self-care. While sitting EOB, pt is able to perform in LB dressing with SBA. Pt requires CGA for sit<>stand with verbal cues for correct hand placement on bed before using RW for safety, to participate in bathroom mobility for toileting/toilet transfer. Educated pt on the role of OT, evaluation process, and goals for therapy with pt demonstrating good understanding. The pt will benefit from further OT services, in order to maximize his ADL performance and decrease the risk for complications associated with decreased functional activity. At the end of the session, pt returned to bed, call-bell in reach, with all needs met.      Patient will benefit from skilled intervention to address the above impairments. Patients rehabilitation potential is considered to be Good  Factors which may influence rehabilitation potential include:   []             None noted  [x]             Mental ability/status  []             Medical condition  []             Home/family situation and support systems  []             Safety awareness  []             Pain tolerance/management  []             Other:      PLAN :  Recommendations and Planned Interventions:   [x]               Self Care Training                  [x]        Therapeutic Activities  [x]               Functional Mobility Training    []        Cognitive Retraining  [x]               Therapeutic Exercises           [x]        Endurance Activities  [x]               Balance Training                   []        Neuromuscular Re-Education  []               Visual/Perceptual Training     [x]   Home Safety Training  [x]               Patient Education                 [x]        Family Training/Education  []               Other (comment):    Frequency/Duration: Patient will be followed by occupational therapy 1-2 times per day/4-7 days per week to address goals. Discharge Recommendations: Home Health  Further Equipment Recommendations for Discharge: TBD     Barriers to Learning/Limitations: yes;  sensory deficits-vision/hearing/speech  Compensate with: visual, verbal, tactile, kinesthetic cues/model     PATIENT COMPLEXITY      Eval Complexity: History: MEDIUM Complexity : Expanded review of history including physical, cognitive and psychosocial  history ; Examination: LOW Complexity : 1-3 performance deficits relating to physical, cognitive , or psychosocial skils that result in activity limitations and / or participation restrictions ;  Decision Making:LOW Complexity : No comorbidities that affect functional and no verbal or physical assistance needed to complete eval tasks  Assessment: Low Complexity     SUBJECTIVE:   Patient stated 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 HE IS ON MEDICAL TREATMENT. ASA AND B-BLOCKER 3/10 RENAL ARTERY DUPLEX : NL RT RENAL ARTERY WITH < 60% STENOSIS OF LEFT RENAL ARTERY    Diabetes ([facility] [address]     Glaucoma     Hypertension     Impotence of organic origin     Malignant neoplasm of prostate ([facility] [address]     Other and unspecified hyperlipidemia      Past Surgical History:   Procedure Laterality Date    COLONOSCOPY N/A 3/16/2019    COLONOSCOPY w/ polypectomies performed by Sinai Solis MD at [address] [place] [address]  3/16/2019         COLONOSCOPY,MYRON Bonds  3/16/2019          Prior Level of Function/Home Situation: Pt reports he lives at the [address] where he was (I) with setup for dressing, toileting, bathing, grooming, and feeding PTA. Home Situation  Home Environment: Assisted living  One/Two Story Residence: One story  Living Alone: No  Support Systems: Assisted living  Patient Expects to be Discharged to[de-identified] Assisted living  Current DME Used/Available at Home: Walker  Tub or Shower Type: Tub/Shower combination (Shower hciar)  [x]  Right hand dominant   []  Left hand dominant  Cognitive/Behavioral Status:  Neurologic State: Alert  Orientation Level: Oriented to person;Oriented to place;Oriented to time  Cognition: Appropriate decision making; Follows commands  Safety/Judgement: Awareness of environment; Fall prevention    Skin: Visible skin appeared intact    Edema: None noted    Coordination:  Coordination: Within functional limits(BUEs)  Fine Motor Skills-Upper: Left Intact; Right Intact    Gross Motor Skills-Upper: Left Intact; Right Intact    Balance:  Sitting: Intact  Standing: Impaired; With support  Standing - Static: Good  Standing - Dynamic : Fair    Strength:  Strength: Generally decreased, functional(BUEs)    Tone & Sensation:  Tone: Normal(BUEs)  Sensation: Intact(BUEs)    Range of Motion:  AROM: Within functional limits(BUEs)    Functional Mobility and Transfers for ADLs:  Bed Mobility:  Supine to Sit: Minimum assistance  Scooting: Setup  Transfers:  Sit to Stand: Contact guard assistance   Toilet Transfer : Contact guard assistance   Bathroom Mobility: Contact guard assistance    ADL Assessment:  Feeding: Setup    Oral Facial Hygiene/Grooming: Setup    Bathing: Stand-by assistance;Supervision    Upper Body Dressing: Stand-by assistance;Supervision    Lower Body Dressing: Stand-by assistance;Supervision    Toileting: Contact guard assistance    ADL Intervention:  Lower Body Dressing Assistance  Dressing Assistance: Stand-by assistance    Toileting  Toileting Assistance: Contact guard assistance    Cognitive Retraining  Safety/Judgement: Awareness of environment; Fall prevention    Pain:  Pt reports 0/10 pain or discomfort prior to treatment.    Pt reports 0/10 pain or discomfort post treatment. Activity Tolerance:   Good    Please refer to the flowsheet for vital signs taken during this treatment. After treatment:   [] Patient left in no apparent distress sitting up in chair  [x] Patient left in no apparent distress in bed  [x] Call bell left within reach  [] Nursing notified  [x] Granddaughter present  [] Bed alarm activated    COMMUNICATION/EDUCATION:   [x] Home safety education was provided and the patient/caregiver indicated understanding. [x] Patient/family have participated as able in goal setting and plan of care. [x] Patient/family agree to work toward stated goals and plan of care. [] Patient understands intent and goals of therapy, but is neutral about his/her participation. [] Patient is unable to participate in goal setting and plan of care.     Thank you for this referral.  Aldair Hair OT  Time Calculation: 25 mins

## 2019-03-18 NOTE — PROGRESS NOTES
[facility] Hospitalist Group  Progress Note    Patient: My Newell Age: 80 y.o. : 3/11/1929 MR#: [id number] SSN: [id number]  Date/Time: 3/18/2019 2:36 PM    Subjective/24-hour events:     No new complaints. Assessment:   Lower GI bleed secondary to stercoral ulcer  Acute blood loss anemia  HTN  DM 2  Chronic constipation  Advanced age     Plan:  OK for diet per SLP - ordered. Monitor H/H.  PT/OT. Return to NH soon if remains stable. Case discussed with:  [x]Patient  []Family  [x]Nursing  []Case Management  DVT Prophylaxis:  []Lovenox  []Hep SQ  [x]SCDs  []Coumadin   []On Heparin gtt    Objective:   VS:   Visit Vitals  /67 (BP 1 Location: Right arm, BP Patient Position: At rest)   Pulse 80   Temp 97.1 °F (36.2 °C)   Resp 19   Ht 5' 8\" (1.727 m)   Wt 90.8 kg (200 lb 1.6 oz)   SpO2 97%   BMI 30.43 kg/m²      Tmax/24hrs: Temp (24hrs), Av.8 °F (36.6 °C), Min:97.1 °F (36.2 °C), Max:98.5 °F (36.9 °C)      Intake/Output Summary (Last 24 hours) at 3/18/2019 1436  Last data filed at 3/18/2019 1035  Gross per 24 hour   Intake    Output 500 ml   Net -500 ml       General:    Cardiovascular:    Pulmonary:    GI:    Extremities: Additional:      Labs:    Recent Results (from the past 24 hour(s))   HGB & HCT    Collection Time: 19  8:10 PM   Result Value Ref Range    HGB 7.8 (L) 13.0 - 16.0 g/dL    HCT 24.4 (L) 36.0 - 48.0 %   CBC WITH AUTOMATED DIFF    Collection Time: 19  4:59 AM   Result Value Ref Range    WBC 8.3 4.6 - 13.2 K/uL    RBC 2.59 (L) 4.70 - 5.50 M/uL    HGB 7.7 (L) 13.0 - 16.0 g/dL    HCT 24.3 (L) 36.0 - 48.0 %    MCV 93.8 74.0 - 97.0 FL    MCH 29.7 24.0 - 34.0 PG    MCHC 31.7 31.0 - 37.0 g/dL    RDW 14.6 (H) 11.6 - 14.5 %    PLATELET 668 700 - 694 K/uL    MPV 10.5 9.2 - 11.8 FL    NEUTROPHILS 59 40 - 73 %    LYMPHOCYTES 22 21 - 52 %    MONOCYTES 15 (H) 3 - 10 %    EOSINOPHILS 3 0 - 5 %    BASOPHILS 1 0 - 2 %    ABS.  NEUTROPHILS 5.0 1.8 - 8.0 K/UL    ABS. LYMPHOCYTES 1.8 0.9 - 3.6 K/UL    ABS. MONOCYTES 1.3 (H) 0.05 - 1.2 K/UL    ABS. EOSINOPHILS 0.2 0.0 - 0.4 K/UL    ABS.  BASOPHILS 0.0 0.0 - 0.1 K/UL    DF AUTOMATED     METABOLIC PANEL, BASIC    Collection Time: 03/18/19  4:59 AM   Result Value Ref Range    Sodium 142 136 - 145 mmol/L    Potassium 3.8 3.5 - 5.5 mmol/L    Chloride 111 (H) 100 - 108 mmol/L    CO2 26 21 - 32 mmol/L    Anion gap 5 3.0 - 18 mmol/L    Glucose 99 74 - 99 mg/dL    BUN 7 7.0 - 18 MG/DL    Creatinine 1.25 0.6 - 1.3 MG/DL    BUN/Creatinine ratio 6 (L) 12 - 20      GFR est AA >60 >60 ml/min/1.73m2    GFR est non-AA 54 (L) >60 ml/min/1.73m2    Calcium 9.0 8.5 - 10.1 MG/DL   HGB & HCT    Collection Time: 03/18/19 10:15 AM   Result Value Ref Range    HGB 7.9 (L) 13.0 - 16.0 g/dL    HCT 25.2 (L) 36.0 - 48.0 %       Signed By: Kishore Arriaga MD     March 18, 2019 2:36 PM

## 2019-03-19 LAB
HCT VFR BLD AUTO: 23.8 % (ref 36–48)
HCT VFR BLD AUTO: 24.6 % (ref 36–48)
HGB BLD-MCNC: 7.3 G/DL (ref 13–16)
HGB BLD-MCNC: 7.7 G/DL (ref 13–16)

## 2019-03-19 PROCEDURE — 74011250637 HC RX REV CODE- 250/637: Performed by: FAMILY MEDICINE

## 2019-03-19 PROCEDURE — 65660000000 HC RM CCU STEPDOWN

## 2019-03-19 PROCEDURE — 74011250637 HC RX REV CODE- 250/637: Performed by: INTERNAL MEDICINE

## 2019-03-19 PROCEDURE — 74011250636 HC RX REV CODE- 250/636: Performed by: FAMILY MEDICINE

## 2019-03-19 PROCEDURE — 92526 ORAL FUNCTION THERAPY: CPT

## 2019-03-19 PROCEDURE — 36415 COLL VENOUS BLD VENIPUNCTURE: CPT

## 2019-03-19 PROCEDURE — 85018 HEMOGLOBIN: CPT

## 2019-03-19 RX ORDER — ONDANSETRON 2 MG/ML
4 INJECTION INTRAMUSCULAR; INTRAVENOUS
Status: DISCONTINUED | OUTPATIENT
Start: 2019-03-19 | End: 2019-03-21 | Stop reason: HOSPADM

## 2019-03-19 RX ORDER — FACIAL-BODY WIPES
10 EACH TOPICAL DAILY PRN
Status: DISCONTINUED | OUTPATIENT
Start: 2019-03-19 | End: 2019-03-21 | Stop reason: HOSPADM

## 2019-03-19 RX ADMIN — Medication 10 ML: at 22:06

## 2019-03-19 RX ADMIN — HYDROCHLOROTHIAZIDE 12.5 MG: 12.5 CAPSULE ORAL at 08:41

## 2019-03-19 RX ADMIN — Medication 10 ML: at 18:05

## 2019-03-19 RX ADMIN — RIVASTIGMINE TARTRATE 1.5 MG: 1.5 CAPSULE ORAL at 08:41

## 2019-03-19 RX ADMIN — SITAGLIPTIN 100 MG: 100 TABLET, FILM COATED ORAL at 08:42

## 2019-03-19 RX ADMIN — LACTULOSE 30 G: 20 SOLUTION ORAL at 08:42

## 2019-03-19 RX ADMIN — ALFUZOSIN HYDROCHLORIDE 10 MG: 10 TABLET ORAL at 21:59

## 2019-03-19 RX ADMIN — Medication 10 ML: at 08:47

## 2019-03-19 RX ADMIN — LORATADINE 10 MG: 10 TABLET ORAL at 08:41

## 2019-03-19 RX ADMIN — LACTULOSE 30 G: 20 SOLUTION ORAL at 21:59

## 2019-03-19 RX ADMIN — AMLODIPINE BESYLATE 5 MG: 5 TABLET ORAL at 08:42

## 2019-03-19 RX ADMIN — VALSARTAN 160 MG: 160 TABLET, FILM COATED ORAL at 08:46

## 2019-03-19 RX ADMIN — ONDANSETRON 4 MG: 2 INJECTION INTRAMUSCULAR; INTRAVENOUS at 09:02

## 2019-03-19 RX ADMIN — MONTELUKAST SODIUM 10 MG: 10 TABLET, FILM COATED ORAL at 21:59

## 2019-03-19 RX ADMIN — SIMVASTATIN 80 MG: 40 TABLET, FILM COATED ORAL at 08:42

## 2019-03-19 RX ADMIN — METOPROLOL SUCCINATE 50 MG: 50 TABLET, EXTENDED RELEASE ORAL at 08:42

## 2019-03-19 RX ADMIN — RIVASTIGMINE TARTRATE 1.5 MG: 1.5 CAPSULE ORAL at 18:04

## 2019-03-19 RX ADMIN — FERROUS SULFATE TAB 325 MG (65 MG ELEMENTAL FE) 325 MG: 325 (65 FE) TAB at 18:04

## 2019-03-19 RX ADMIN — LACTULOSE 30 G: 20 SOLUTION ORAL at 18:04

## 2019-03-19 RX ADMIN — FERROUS SULFATE TAB 325 MG (65 MG ELEMENTAL FE) 325 MG: 325 (65 FE) TAB at 08:45

## 2019-03-19 NOTE — PROGRESS NOTES
Problem: Dysphagia (Adult) Goal: *Acute Goals and Plan of Care (Insert Text) Patient will: 1. Tolerate PO trials with 0 s/s overt distress in 4/5 trials 2. Utilize compensatory swallow strategies/maneuvers (decrease bite/sip, size/rate, alt. liq/sol) with min cues in 4/5 trials 3. Perform oral-motor/laryngeal exercises to increase oropharyngeal swallow function with min cues 4. Complete an objective swallow study (i.e., MBSS) to assess swallow integrity, r/o aspiration, and determine of safest LRD, min A - met 3/18/19 Rec:    
Puree diet with honey-thick liquids Aspiration precautions HOB >45 during po intake, remain >30 for 30-45 minutes after po Small bites/sips; alternate liquid/solid with slow feeding rate Oral care TID Meds per pt preference OP SLP services upon DC from this facility Outcome: Progressing Towards Goal 
Speech language pathology dysphagia treatment Patient: Sasha Vasques (49 y.o. male) Date: 3/19/2019 Diagnosis: GI bleed [K92.2] <principal problem not specified> Procedure(s) (LRB): 
COLONOSCOPY w/ polypectomies (N/A) 3 Days Post-Op Precautions: Aspiration ASSESSMENT: 
Pt was seen at bedside for f/u dysphagia management. Pt alert and able to follow all commands throughout eval. Pt educated on results of MBS completed 3/18/19. Pt tolerated honey thick liquid via cup sip in single sips with function laryngeal elevation to palpation and no overt s/sx of aspiration. Pt tolerated puree with appropriate A-P transfer, large audible gag x1 trial which was resolved with smaller bites, and no overt s/sx of aspiration. Pt completed 1 set of 5 reps of oropharyngeal exercises with min-mod SLP assistance. Pt educated on completing these exercises twice daily, verbalized understanding. Rec continued puree diet with honey thick liquids, meds with honey thick or puree, aspiration precautions, and oral care TID.  Discussed with pt, verbalized understanding. SLP to follow. Progression toward goals: 
[x]         Improving appropriately and progressing toward goals 
[]         Improving slowly and progressing toward goals 
[]         Not making progress toward goals and plan of care will be adjusted PLAN: 
Recommendations and Planned Interventions: See above Patient continues to benefit from skilled intervention to address the above impairments. Continue treatment per established plan of care. Discharge Recommendations:  Outpatient and To Be Determined SUBJECTIVE:  
Patient stated I haven't been [place]. OBJECTIVE:  
Cognitive and Communication Status: 
Neurologic State: Alert Orientation Level: Appropriate for age Cognition: Follows commands Perception: Appears intact Perseveration: No perseveration noted Safety/Judgement: Awareness of environment, Fall prevention Dysphagia Treatment: 
Oral Assessment: 
Oral Assessment Labial: No impairment Dentition: Upper dentures, Lower dentures Oral Hygiene: adequate Lingual: No impairment Velum: No impairment Mandible: No impairment P.O. Trials: 
 Patient Position: [facility] at [facility] Vocal quality prior to P.O.: No impairment Consistency Presented: Honey thick liquid, Puree How Presented: Self-fed/presented, SLP-fed/presented, Cup/sip, Spoon Bolus Acceptance: No impairment Bolus Formation/Control: No impairment Propulsion: No impairment Oral Residue: None Initiation of Swallow: No impairment Laryngeal Elevation: Functional 
 Aspiration Signs/Symptoms: None Pharyngeal Phase Characteristics: No impairment, issues, or problems Effective Modifications: Small sips and bites Cues for Modifications: Minimal 
 Oral Phase Severity: No impairment Pharyngeal Phase Severity : No impairment Oral Motor Exercises: 
 Lingual Protrusion: Yes Sets : 1 Reps : 5 Lingual Protraction: Yes Sets : 1 Reps : 5 Lingual Lateralization-Exterior: Yes Sets: 1 Reps : 5 Exercises: Laryngeal Exercises: 
Sustained \"ah\": Yes Sets : 1 Reps : 5 Effortful Swallow: Yes Sets : 1 Reps : 5 Kell: Yes Sets : 1 Reps : 5 PAIN: 
Pt reports 0/10 pain or discomfort prior to tx. Pt reports 0/10 pain or discomfort post tx. After treatment:  
[]              Patient left in no apparent distress sitting up in chair 
[x]              Patient left in no apparent distress in bed 
[x]              Call bell left within reach 
[]              Nursing notified 
[]              Caregiver present 
[]              Bed alarm activated COMMUNICATION/EDUCATION:  
[x]              SLP educated pt with regard to compensatory swallow strategies and 
      aspiration/reflux precautions including: small bites/sips, 
      alternate liquids/solids, decrease feeding rate, HOB > 45 with all po, and 
                 upright in bed at 30 degrees after po for at least 45 
      minutes. Zohreh Michael, SLP intern

## 2019-03-19 NOTE — PROGRESS NOTES
Pt.'s family wishes to speak to MD regarding gagging/vomiting episodes. Phone numbers are on pt white board.

## 2019-03-19 NOTE — PROGRESS NOTES
conducted an initial consultation and Spiritual Assessment for My Newell, who is a 80 y.o.,male. Patients Primary Language is: [place]. According to the patients EMR Hinduism Affiliation is: [place]. The reason the Patient came to the hospital is:  
Patient Active Problem List  
 Diagnosis Date Noted  Stercoral ulcer of rectum 03/16/2019 [facility] Internal hemorrhoids 03/16/2019  Diverticulosis 03/16/2019  GI bleed 03/13/2019  Coronary atherosclerosis of native coronary artery  Other and unspecified hyperlipidemia  Malignant neoplasm of prostate ([facility] [address] 09/17/2010 The  provided the following Interventions: 
Initiated a relationship of care and support. Explored issues of brandee, spirituality and/or Episcopalian needs while hospitalized. Listened empathically. Provided chaplaincy education. Provided information about Spiritual Care Services. Offered prayer and assurance of continued prayers on patient's behalf. Chart reviewed. The following outcomes were achieved: 
Patient shared some information about their medical narrative and spiritual journey/beliefs. Patient processed feeling about current hospitalization. Patient expressed gratitude for the 's visit. Assessment: 
Patient did not indicate any spiritual or Episcopalian issues which require Spiritual Care Services interventions at this time. Patient does not have any Episcopalian/cultural needs that will affect patients preferences in health care. Plan: 
Chaplains will continue to follow and will provide pastoral care on an as needed or requested basis.  recommends bedside caregivers page  on duty if patient shows signs of acute spiritual or emotional distress. keri Burris [facility] Spiritual Care Department [phone]

## 2019-03-19 NOTE — PROGRESS NOTES
[facility] Hospitalist Group Progress Note Patient: Kg Odom Age: 80 y.o. : 3/11/1929 MR#: [id number] SSN: [id number] Date/Time: 3/19/2019 Subjective/24-hour events:  
 
Some nausea this AM with poor intake. No BM recorded overnight but patient reports having one. Assessment:  
Lower GI bleed secondary to stercoral ulcer Acute blood loss anemia HTN 
DM 2 Chronic constipation Advanced age Plan: 
Increase lactulose to TID, add dulcolax suppository. Zofran added PRN. Continue diet as tolerated. PT/OT, mobilize as much as able/tolerated. Return to SNF as soon as medically stable and authorization obtained. Case discussed with:  [x]Patient  []Family  [x]Nursing  [x]Case Management DVT Prophylaxis:  []Lovenox  []Hep SQ  [x]SCDs  []Coumadin   []On Heparin gtt Objective:  
VS:  
Visit Vitals /63 (BP 1 Location: Right arm, BP Patient Position: At rest) Pulse 67 Temp 97.7 °F (36.5 °C) Resp 20 Ht 5' 8\" (1.727 m) Wt 90.8 kg (200 lb 1.6 oz) SpO2 95% BMI 30.43 kg/m² Tmax/24hrs: Temp (24hrs), Av.7 °F (36.5 °C), Min:97 °F (36.1 °C), Max:98.5 °F (36.9 °C) No intake or output data in the 24 hours ending 19 1149 General:  In NAD. Zenaida Ross Cardiovascular: RRR. Pulmonary:  No wheezes, effort nonlabored. GI:  Abdomen soft. Extremities:  Warm, no ischemia. Neuro:  Sleepy but arousable. Moves extremities spontaneously. Labs:   
Recent Results (from the past 24 hour(s)) HGB & HCT Collection Time: 19  8:13 PM  
Result Value Ref Range HGB 8.2 (L) 13.0 - 16.0 g/dL HCT 26.0 (L) 36.0 - 48.0 % HGB & HCT Collection Time: 19  9:55 AM  
Result Value Ref Range HGB 7.7 (L) 13.0 - 16.0 g/dL HCT 24.6 (L) 36.0 - 48.0 % Signed By: Roz Hayward MD   
 2019

## 2019-03-19 NOTE — ROUTINE PROCESS
Bedside shift change report given to Santos Yarbrough RN (oncoming nurse) by Gisella Hawkins RN (offgoing nurse). Report included the following information SBAR, Kardex, Intake/Output, Recent Results and Cardiac Rhythm NSR.

## 2019-03-19 NOTE — PROGRESS NOTES
Accepted to [facility], authorization received, MD contacted.      MAE Ramos  Case Management  [phone]

## 2019-03-20 LAB
ANION GAP SERPL CALC-SCNC: 5 MMOL/L (ref 3–18)
BUN SERPL-MCNC: 8 MG/DL (ref 7–18)
BUN/CREAT SERPL: 5 (ref 12–20)
CALCIUM SERPL-MCNC: 8.7 MG/DL (ref 8.5–10.1)
CHLORIDE SERPL-SCNC: 111 MMOL/L (ref 100–108)
CO2 SERPL-SCNC: 27 MMOL/L (ref 21–32)
CREAT SERPL-MCNC: 1.59 MG/DL (ref 0.6–1.3)
GLUCOSE SERPL-MCNC: 99 MG/DL (ref 74–99)
HCT VFR BLD AUTO: 24.6 % (ref 36–48)
HCT VFR BLD AUTO: 26.2 % (ref 36–48)
HGB BLD-MCNC: 7.6 G/DL (ref 13–16)
HGB BLD-MCNC: 7.8 G/DL (ref 13–16)
POTASSIUM SERPL-SCNC: 3.8 MMOL/L (ref 3.5–5.5)
SODIUM SERPL-SCNC: 143 MMOL/L (ref 136–145)

## 2019-03-20 PROCEDURE — 74011250637 HC RX REV CODE- 250/637: Performed by: INTERNAL MEDICINE

## 2019-03-20 PROCEDURE — 97116 GAIT TRAINING THERAPY: CPT

## 2019-03-20 PROCEDURE — 85018 HEMOGLOBIN: CPT

## 2019-03-20 PROCEDURE — 97530 THERAPEUTIC ACTIVITIES: CPT

## 2019-03-20 PROCEDURE — 36415 COLL VENOUS BLD VENIPUNCTURE: CPT

## 2019-03-20 PROCEDURE — 97535 SELF CARE MNGMENT TRAINING: CPT

## 2019-03-20 PROCEDURE — 74011250637 HC RX REV CODE- 250/637: Performed by: FAMILY MEDICINE

## 2019-03-20 PROCEDURE — 80048 BASIC METABOLIC PNL TOTAL CA: CPT

## 2019-03-20 PROCEDURE — 65660000000 HC RM CCU STEPDOWN

## 2019-03-20 PROCEDURE — 92526 ORAL FUNCTION THERAPY: CPT

## 2019-03-20 RX ADMIN — LORATADINE 10 MG: 10 TABLET ORAL at 09:44

## 2019-03-20 RX ADMIN — LACTULOSE 30 G: 20 SOLUTION ORAL at 21:54

## 2019-03-20 RX ADMIN — VALSARTAN 160 MG: 160 TABLET, FILM COATED ORAL at 09:43

## 2019-03-20 RX ADMIN — LACTULOSE 30 G: 20 SOLUTION ORAL at 09:46

## 2019-03-20 RX ADMIN — SIMVASTATIN 80 MG: 40 TABLET, FILM COATED ORAL at 09:42

## 2019-03-20 RX ADMIN — Medication 10 ML: at 23:19

## 2019-03-20 RX ADMIN — METOPROLOL SUCCINATE 50 MG: 50 TABLET, EXTENDED RELEASE ORAL at 09:44

## 2019-03-20 RX ADMIN — HYDROCHLOROTHIAZIDE 12.5 MG: 12.5 CAPSULE ORAL at 09:45

## 2019-03-20 RX ADMIN — FERROUS SULFATE TAB 325 MG (65 MG ELEMENTAL FE) 325 MG: 325 (65 FE) TAB at 17:35

## 2019-03-20 RX ADMIN — Medication 10 ML: at 23:18

## 2019-03-20 RX ADMIN — LACTULOSE 30 G: 20 SOLUTION ORAL at 17:35

## 2019-03-20 RX ADMIN — Medication 10 ML: at 17:36

## 2019-03-20 RX ADMIN — RIVASTIGMINE TARTRATE 1.5 MG: 1.5 CAPSULE ORAL at 09:51

## 2019-03-20 RX ADMIN — Medication 10 ML: at 09:51

## 2019-03-20 RX ADMIN — ALFUZOSIN HYDROCHLORIDE 10 MG: 10 TABLET ORAL at 21:22

## 2019-03-20 RX ADMIN — FERROUS SULFATE TAB 325 MG (65 MG ELEMENTAL FE) 325 MG: 325 (65 FE) TAB at 09:43

## 2019-03-20 RX ADMIN — SITAGLIPTIN 100 MG: 100 TABLET, FILM COATED ORAL at 09:43

## 2019-03-20 RX ADMIN — MONTELUKAST SODIUM 10 MG: 10 TABLET, FILM COATED ORAL at 21:22

## 2019-03-20 RX ADMIN — RIVASTIGMINE TARTRATE 1.5 MG: 1.5 CAPSULE ORAL at 17:36

## 2019-03-20 RX ADMIN — AMLODIPINE BESYLATE 5 MG: 5 TABLET ORAL at 09:44

## 2019-03-20 NOTE — PROGRESS NOTES
Granddaughter present in room, asked questions about pt's status and d/c plans. Confirmed return to [facility]. CM explained swallowing issues, and consulted MD to speak with granddaughter or son. MAE Farooq Case Management [phone]

## 2019-03-20 NOTE — PROGRESS NOTES
Problem: Dysphagia (Adult) Goal: *Acute Goals and Plan of Care (Insert Text) Description Patient will: 1. Tolerate PO trials with 0 s/s overt distress in 4/5 trials 2. Utilize compensatory swallow strategies/maneuvers (decrease bite/sip, size/rate, alt. liq/sol) with min cues in 4/5 trials 3. Perform oral-motor/laryngeal exercises to increase oropharyngeal swallow function with min cues 4. Complete an objective swallow study (i.e., MBSS) to assess swallow integrity, r/o aspiration, and determine of safest LRD, min A - met 3/18/19 Rec:    
Puree diet with pudding-thick liquids Aspiration precautions HOB >45 during po intake, remain >30 for 30-45 minutes after po Small bites/sips; alternate liquid/solid with slow feeding rate Oral care TID Meds per pt preference OP SLP services upon DC from this facility Outcome: Not Progressing Towards Goal 
 
SPEECH LANGUAGE PATHOLOGY DYSPHAGIA TREATMENT Patient: Bob Navarrete (28 y.o. male) Date: 3/20/2019 Diagnosis: GI bleed [K92.2] <principal problem not specified> Procedure(s) (LRB): 
COLONOSCOPY w/ polypectomies (N/A) 4 Days Post-Op Precautions: aspiration ASSESSMENT: 
Pt was seen at bedside for follow up dysphagia management. Pt observed gagging/throat clearing after honey-thick liquids; improved with puree and pudding-thick liquids. Laryngeal elevation was weak/delayed to palpation. Pt completed samy and effortful swallows 5 rep x 1 with mod cues from SLP. Rec diet change to puree with pudding-thick liquids, aspiration precautions, oral care TID and meds as tolerated. SLP available for repeat MBS per MD request. ? May benefit from GI to further assess esophageal motility? D/w RN, pt, and MD. Maria L Hamm will continue to follow. Progression toward goals: 
?         Improving appropriately and progressing toward goals ? Improving slowly and progressing toward goals ?         Not making progress toward goals and plan of care will be adjusted PLAN: 
Recommendations and Planned Interventions: See above Patient continues to benefit from skilled intervention to address the above impairments. Continue treatment per established plan of care. Discharge Recommendations:  Robert Paredes and To Be Determined SUBJECTIVE:  
Patient stated ? Sometimes it feels like it gets hung up and then I cough? . 
 
OBJECTIVE:  
Cognitive and Communication Status: 
Neurologic State: Alert Orientation Level: Oriented X4 Cognition: Follows commands Perception: Appears intact Perseveration: No perseveration noted Safety/Judgement: Awareness of environment, Fall prevention Dysphagia Treatment: 
Oral Assessment: 
Oral Assessment Labial: No impairment Dentition: Upper dentures, Lower dentures Oral Hygiene: adequate Lingual: No impairment Velum: No impairment Mandible: No impairment P.O. Trials: 
Patient Position: 50 at [facility] Vocal quality prior to P.O.: No impairment Consistency Presented: Honey thick liquid, Puree How Presented: Self-fed/presented, SLP-fed/presented, Cup/sip, Spoon Bolus Acceptance: No impairment Bolus Formation/Control: No impairment Propulsion: No impairment Oral Residue: None Initiation of Swallow: No impairment Laryngeal Elevation: weak Aspiration Signs/Symptoms: None Pharyngeal Phase Characteristics: gagging, coughing/choking with honey-thick liquids Effective Modifications: Small sips and bites Cues for Modifications: Minimal 
 
 Oral Phase Severity: No impairment Pharyngeal Phase Severity : mod-sev Exercises: 
Laryngeal Exercises: 
 
Effortful Swallow: Yes Sets : 1 Reps : 5 Kell: Yes Sets : 1 Reps : 5 PAIN: 
Pt reports 0/10 pain or discomfort prior to tx. Pt reports  0/10 pain or discomfort post tx. After treatment:  
?              Patient left in no apparent distress sitting up in chair ?              Patient left in no apparent distress in bed 
? Call bell left within reach ? Nursing notified ? Caregiver present ? Bed alarm activated COMMUNICATION/EDUCATION:  
?          SLP educated pt with regard to compensatory swallow strategies and 
      aspiration/reflux precautions including: small bites/sips, 
      alternate liquids/solids, decrease feeding rate, HOB > 45 with all po, and 
             upright in bed at 30 degrees after po for at least 45 minutes. Thank you for this referral. 
 
Braulio Zuleta M.S. CCC-SLP/L Speech-Language Pathologist

## 2019-03-20 NOTE — PROGRESS NOTES
OCCUPATIONAL THERAPY TREATMENT Patient: Emmy Kruger (24 y.o. male) Date: 3/20/2019 Diagnosis: GI bleed [K92.2] <principal problem not specified> Procedure(s) (LRB): 
COLONOSCOPY w/ polypectomies (N/A) 4 Days Post-Op Precautions:   
Chart, occupational therapy assessment, plan of care, and goals were reviewed. ASSESSMENT: 
Pt is very pleasant and cooperative, agreeable to participate in OT and to get OOB for ADLs. Pt required additional time and VCs to maneuver to the EOB in preparation for ADLs. Pt is w/occasional episodes of forgetfulness throughout the session, requiring frequent repetition of the education. Pt educated on role of OT in acute setting and on the importance of participating in OT sessions to improve strength and endurance and increase independence with ADLs. Pt verbalized understanding. Pt was able to perform LB dressing w/Set-up, following which maneuvered to std w/SBA and max VCs for hands placement on FWW. Pt performed toileting hygiene w/SBA 2/2 slight BM on the tata pad. Pt maneuvered to the chair w/FWW, simulating txfr to the [facility] w/SBA for safety and VCs for sequencing during descend. Pt educated on not getting up w/o assist and caling nurse if needed to go to the BR, pt verbalized understanding. MAKAYLA Sands is present in room at the end of the session, pt set up to eat breakfast. 
Progression toward goals: 
?          Improving appropriately and progressing toward goals ? Improving slowly and progressing toward goals ? Not making progress toward goals and plan of care will be adjusted PLAN: 
Patient continues to benefit from skilled intervention to address the above impairments. Continue treatment per established plan of care. Discharge Recommendations:  Robert Paredes Further Equipment Recommendations for Discharge:  N/A  
  
 
SUBJECTIVE:  
Patient stated ? Thank you for explaining everything. ? OBJECTIVE DATA SUMMARY:  
 Cognitive/Behavioral Status: 
Neurologic State: Alert Orientation Level: Oriented X4 Cognition: Follows commands Safety/Judgement: Awareness of environment, Fall prevention Functional Mobility and Transfers for ADLs: 
Bed Mobility: 
  
Supine to Sit: Contact guard assistance Transfers: 
Sit to Stand: Stand-by assistance Bed to Chair: Stand-by assistance Toilet Transfer : Stand-by assistance(simulated) Bathroom Mobility: Stand-by assistance Balance: 
Sitting: Intact Standing: Impaired; With support Standing - Static: Good Standing - Dynamic : Fair ADL Intervention: 
Grooming Grooming Assistance: Set-up(seated EOB) Washing Hands: Supervision/set-up Lower Body Dressing Assistance Socks: Supervision/set-up Toileting Toileting Assistance: Stand-by assistance Bowel Hygiene: Stand-by assistance Pain: 
Pt reports 0/10 pain or discomfort prior to treatment. Pt reports 0/10 pain or discomfort post treatment. Activity Tolerance:   
Fair Please refer to the flowsheet for vital signs taken during this treatment. After treatment:  
?  Patient left in no apparent distress sitting up in chair ? Patient left in no apparent distress in bed 
? Call bell left within reach ? Nursing notified ? CNA present ? Bed alarm activated FANY SpannA/L Time Calculation: 23 mins

## 2019-03-20 NOTE — ROUTINE PROCESS
Bedside and Verbal shift change report given to Arron Xiao (oncoming nurse) by Jeyson Vargas (offgoing nurse). Report included the following information SBAR, Intake/Output, MAR, Recent Results and Cardiac Rhythm Sinus Rhythm.

## 2019-03-20 NOTE — PROGRESS NOTES
NUTRITION Nutrition Screen RECOMMENDATIONS / PLAN:  
 
- Continue current diet order per SLP. Monitor diet tolerance. - Continue RD inpatient monitoring and evaluation. NUTRITION INTERVENTIONS & DIAGNOSIS:  
 
[x] Meals/snacks: modify composition Nutrition Diagnosis: Swallowing difficulty related to decreased oral function as evidenced by SLP recommendation requiring modified consistency diet with thickened liquids. ASSESSMENT:  
 
Pt sleeping soundly in chair during visit. SLP following pt and liquids downgraded to pudding thick today. Noted some episodes of nausea yesterday however per RN pt not c/o nausea today and consumed 100% of breakfast. 
 
Average po intake adequate to meet patients estimated nutritional needs:   [x] Yes     [] No   [] Unable to determine at this time Diet: DIET DIABETIC CONSISTENT CARB Pureed; 4 Pudding/4 Extremely Thick Food Allergies: NKFA Current Appetite:   [x] Good per RN     [] Fair     [] Poor     [] Other: 
Appetite/meal intake prior to admission:   [] Good     [] Fair     [] Poor     [x] Other: unknown Feeding Limitations:  [x] Swallowing difficulty: SLP following: MBS completed 3/18/19    [] Chewing difficulty    [] Other: 
Current Meal Intake:  
Patient Vitals for the past 100 hrs: 
 % Diet Eaten 03/18/19 1619 95 % BM: 3/19 Skin Integrity: WDL Edema:   [] No     [x] Yes Pertinent Medications: Reviewed: PRN dulcolax, ferrous sulfate, lactulose, zofran, Isabelle Seals ordered) Recent Labs  
  03/20/19 
0255 03/18/19 
0459  142  
K 3.8 3.8 * 111* CO2 27 26 GLU 99 99 BUN 8 7 CREA 1.59* 1.25  
CA 8.7 9.0 No intake or output data in the 24 hours ending 03/20/19 1122 Anthropometrics: 
Ht Readings from Last 1 Encounters:  
03/16/19 5' 8\" (1.727 m) Last 3 Recorded Weights in this Encounter 03/16/19 0505 03/18/19 9996 03/20/19 8535 Weight: 95.3 kg (210 lb 3.2 oz) 90.8 kg (200 lb 1.6 oz) 88.8 kg (195 lb 11.2 oz) Body mass index is 29.76 kg/m². Weight History: Pt sleeping during initial visit. Per chart review pt with a weight gain x 1 year PTA. Weight Metrics 3/20/2019 1/23/2018 12/12/2017 2/13/2014 1/24/2013 9/16/2011 8/2/2011 Weight 195 lb 11.2 oz 180 lb 179 lb 212 lb 213 lb 213 lb 213 lb BMI 29.76 kg/m2 31.89 kg/m2 31.71 kg/m2 36.37 kg/m2 36.54 kg/m2 36.54 kg/m2 - Admitting Diagnosis: GI bleed [K92.2] Pertinent PMHx: prostate cancer, CAD, DM, HLD Education Needs:        [x] None identified  [] Identified - Not appropriate at this time  []  Identified and addressed - refer to education log Learning Limitations:   [x] None identified  [] Identified Cultural, Buddhism & ethnic food preferences:  [x] None identified    [] Identified and addressed ESTIMATED NUTRITION NEEDS:  
 
Calories: 9729-4533 kcal (MSJx1.2-1.3) based on  [x] Actual BW: 89 kg      [] IBW Protein: 71-89 gm (0.8-1 gm/kg) based on  [x] Actual BW      [] IBW Fluid: 1 mL/kcal 
  
MONITORING & EVALUATION:  
 
Nutrition Goal(s): 1. Po intake of meals will meet >75% of patient estimated nutritional needs within the next 7 days. Outcome:  [] Met/Ongoing    []  Not Met    [x] New/Initial Goal  
 
Monitoring:   [x] Food and beverage intake   [x] Diet order   [x] Nutrition-focused physical findings   [x] Treatment/therapy   [] Weight   [] Enteral nutrition intake Previous Recommendations (for follow-up assessments only):     []   Implemented       []   Not Implemented (RD to address)      [] No Longer Appropriate     [] No Recommendation Made Discharge Planning: diabetic diet; consistency per SLP [x] Participated in care planning, discharge planning, & interdisciplinary rounds as appropriate Gilberto Fish RD Pager: [phone]

## 2019-03-20 NOTE — PROGRESS NOTES
[facility] Hospitalist Group Progress Note Patient: Flakita Stephens Age: 80 y.o. : 3/11/1929 MR#: [id number] SSN: [id number] Date/Time: 3/20/2019 Subjective/24-hour events:  
 
Patient has continued to have some coughing with oral intake. Sitting in chair at bedside currently, no new issues o/w. Assessment:  
Lower GI bleed secondary to stercoral ulcer Acute blood loss anemia HTN 
DM 2 Chronic constipation Advanced age Plan: D/W SLP this AM - plan to modify diet further and monitor intake. If continues to exhibit difficulty with PO, may require GI consultation for further evaluation. Supportive care o/w. Patient has SNF bed at [facility]. Disposition as soon as able. [facility] aware of continued issues with swallowing. Case discussed with:  [x]Patient  []Family  []Nursing  [x]Case Management DVT Prophylaxis:  []Lovenox  []Hep SQ  [x]SCDs  []Coumadin   []On Heparin gtt Objective:  
VS:  
Visit Vitals /58 (BP 1 Location: Right arm, BP Patient Position: At rest) Pulse 70 Temp 96.4 °F (35.8 °C) Resp 20 Ht 5' 8\" (1.727 m) Wt 88.8 kg (195 lb 11.2 oz) SpO2 (!) 87% BMI 29.76 kg/m² Tmax/24hrs: Temp (24hrs), Av.3 °F (36.3 °C), Min:96.4 °F (35.8 °C), Max:98 °F (36.7 °C) No intake or output data in the 24 hours ending 19 1233 General:  In NAD. Joon Carvalho Cardiovascular: RRR. Pulmonary:  No wheezes, effort nonlabored. GI:  Abdomen soft. Extremities:  Warm, no ischemia. Neuro:  Awake and alert. Moves extremities spontaneously. Labs:   
Recent Results (from the past 24 hour(s)) HGB & HCT Collection Time: 19  9:34 PM  
Result Value Ref Range HGB 7.3 (L) 13.0 - 16.0 g/dL HCT 23.8 (L) 36.0 - 48.0 % METABOLIC PANEL, BASIC Collection Time: 19  2:55 AM  
Result Value Ref Range Sodium 143 136 - 145 mmol/L Potassium 3.8 3.5 - 5.5 mmol/L  Chloride 111 (H) 100 - 108 mmol/L  
 CO2 27 21 - 32 mmol/L  
 Anion gap 5 3.0 - 18 mmol/L Glucose 99 74 - 99 mg/dL BUN 8 7.0 - 18 MG/DL Creatinine 1.59 (H) 0.6 - 1.3 MG/DL  
 BUN/Creatinine ratio 5 (L) 12 - 20 GFR est AA 50 (L) >60 ml/min/1.73m2 GFR est non-AA 41 (L) >60 ml/min/1.73m2 Calcium 8.7 8.5 - 10.1 MG/DL  
HGB & HCT Collection Time: 03/20/19  9:10 AM  
Result Value Ref Range HGB 7.8 (L) 13.0 - 16.0 g/dL HCT 26.2 (L) 36.0 - 48.0 % Signed By: Edd Lucero MD   
 March 20, 2019

## 2019-03-20 NOTE — PROGRESS NOTES
Problem: Mobility Impaired (Adult and Pediatric) Goal: *Acute Goals and Plan of Care (Insert Text) Description Physical Therapy Goals Initiated 3/15/2019 and to be accomplished within 7 day(s) 1. Patient will move from supine to sit and sit to supine , scoot up and down and roll side to side in bed with supervision/set-up. 2.  Patient will transfer from bed to chair and chair to bed with supervision/set-up using the least restrictive device. 3.  Patient will perform sit to stand with supervision/set-up. 4.  Patient will ambulate with supervision/set-up for >100 feet with the least restrictive device. Outcome: Progressing Towards Goal 
  
PHYSICAL THERAPY TREATMENT Patient: King Solis (72 y.o. male) Date: 3/20/2019 Diagnosis: GI bleed [K92.2] <principal problem not specified> Procedure(s) (LRB): 
COLONOSCOPY w/ polypectomies (N/A) 4 Days Post-Op Precautions:    
Chart, physical therapy assessment, plan of care and goals were reviewed. OBJECTIVE/ ASSESSMENT: 
Patient found semi reclined in bed willing to work with PT. Pt transferred supine<>sit Min A and VC's for use of hand rails. Pt then performed seated therex on EOB (see below). Pt stood to RW SBA and VC's for hand placement. Pt ambulated into [facility] 85ft CGA for increased safety and no rest breaks and no LOB this tx. Pt demonstrated a narrowed krish, decreased step clearance, and slow gait speed. Pt able to widen stance with VC's. Pt also required occ VC's for RW mgmt to keep RW close to KRISH. Pt declined sitting up in chair and stated he had been up majority of the day already. Pt returned to EOB in room and demonstrated safe stand to sit transfer. Pt performed sit<>supine with CGA and scooted up towards HOB SBA and use of bed rails in trendelenburg position. Pt was positioned to comfort semi reclined in bed and was left with all needs in reach and nursing notified. Education: 
?         Bed mobility ?         Transfers ? Ambulation / gait ? Assistive device management ? Stairs ? Body mechanics ? Position change ? Therapeutic exercise ? Activity pacing / energy conservation ? Other: 
 
Progression toward goals: 
?      Improving appropriately and progressing toward goals ? Improving slowly and progressing toward goals ? Not making progress toward goals and plan of care will be adjusted PLAN: 
Patient continues to benefit from skilled intervention to address the above impairments. Continue treatment per established plan of care. Discharge Recommendations:  Robert Paredes Further Equipment Recommendations for Discharge:  rolling walker SUBJECTIVE:  
Patient stated ? I will do whatever it is you want me to do.? OBJECTIVE DATA SUMMARY:  
Critical Behavior: 
Neurologic State: Alert Orientation Level: Oriented X4 Cognition: Follows commands Safety/Judgement: Awareness of environment, Fall prevention Functional Mobility Training: 
Bed Mobility: 
Supine to Sit: Minimum assistance Scooting: Contact guard assistance Transfers: 
Sit to Stand: Stand-by assistance Stand to Sit: Stand-by assistance Bed to Chair: Stand-by assistance Balance: 
Sitting: Intact Standing: Impaired; With support Standing - Static: Good Standing - Dynamic : Fair Ambulation/Gait Training: 
Distance (ft): 85 Feet (ft) Assistive Device: Walker, rolling Ambulation - Level of Assistance: Contact guard assistance Gait Abnormalities: Decreased step clearance Base of Support: Center of gravity altered;Narrowed Speed/Marlen: Slow Step Length: Right shortened;Left shortened Interventions: Verbal cues; Tactile cues; Safety awareness training Therapeutic Exercises:  
 
 
EXERCISE Sets Reps Active Active Assist  
Passive Self- assited ROM Comments Ankle Pumps 1 10 ? ? ? ?   
Quad Sets   ? ? ? ?    
Glut Sets   ? ? ? ?   
 Short Arc Quads   ? ? ? ? Heel Slides   ? ? ? ? Straight Leg Raises   ? ? ? ? Hip Abd   ? ? ? ? Long Arc Quads 1 10 ? ? ? ? Seated Marching   ? ? ? ? Seated Knee Flexion   ? ? ? ? Standing Marching   ? ? ? ? Pain: 
Pre tx pain: 0/10 Post tx pain: 0/10 Activity Tolerance:  
fair Please refer to the flowsheet for vital signs taken during this treatment. After treatment:  
? Patient left in no apparent distress sitting up in chair ? Patient left in no apparent distress in bed 
? Call bell left within reach ? Nursing notified ? Caregiver present ? Bed alarm activated ? SCDs applied ? Ice applied Kailee Campoverde PTA Time Calculation: 24 mins

## 2019-03-21 VITALS
RESPIRATION RATE: 18 BRPM | DIASTOLIC BLOOD PRESSURE: 74 MMHG | SYSTOLIC BLOOD PRESSURE: 120 MMHG | WEIGHT: 193.5 LBS | HEART RATE: 71 BPM | OXYGEN SATURATION: 100 % | BODY MASS INDEX: 29.33 KG/M2 | TEMPERATURE: 97.2 F | HEIGHT: 68 IN

## 2019-03-21 LAB
HCT VFR BLD AUTO: 25.1 % (ref 36–48)
HGB BLD-MCNC: 7.6 G/DL (ref 13–16)

## 2019-03-21 PROCEDURE — 92526 ORAL FUNCTION THERAPY: CPT

## 2019-03-21 PROCEDURE — 36415 COLL VENOUS BLD VENIPUNCTURE: CPT

## 2019-03-21 PROCEDURE — 74011250637 HC RX REV CODE- 250/637: Performed by: FAMILY MEDICINE

## 2019-03-21 PROCEDURE — 85018 HEMOGLOBIN: CPT

## 2019-03-21 PROCEDURE — 74011250637 HC RX REV CODE- 250/637: Performed by: INTERNAL MEDICINE

## 2019-03-21 RX ADMIN — AMLODIPINE BESYLATE 5 MG: 5 TABLET ORAL at 09:29

## 2019-03-21 RX ADMIN — VALSARTAN 160 MG: 160 TABLET, FILM COATED ORAL at 09:29

## 2019-03-21 RX ADMIN — RIVASTIGMINE TARTRATE 1.5 MG: 1.5 CAPSULE ORAL at 09:28

## 2019-03-21 RX ADMIN — METOPROLOL SUCCINATE 50 MG: 50 TABLET, EXTENDED RELEASE ORAL at 09:29

## 2019-03-21 RX ADMIN — FERROUS SULFATE TAB 325 MG (65 MG ELEMENTAL FE) 325 MG: 325 (65 FE) TAB at 09:29

## 2019-03-21 RX ADMIN — HYDROCHLOROTHIAZIDE 12.5 MG: 12.5 CAPSULE ORAL at 09:29

## 2019-03-21 RX ADMIN — LORATADINE 10 MG: 10 TABLET ORAL at 09:29

## 2019-03-21 RX ADMIN — SITAGLIPTIN 50 MG: 50 TABLET, FILM COATED ORAL at 09:29

## 2019-03-21 RX ADMIN — SIMVASTATIN 80 MG: 40 TABLET, FILM COATED ORAL at 09:29

## 2019-03-21 RX ADMIN — Medication 10 ML: at 06:14

## 2019-03-21 NOTE — PROGRESS NOTES
Pt not seen for skilled PT due to: 
[]  Nausea/vomiting 
[]  Eating 
[]  Pain 
[x]  Pt lethargic 
[]  Off Unit for cardiac cath Will f/u later as schedule allows. Thank you.  
Arturo Perales, PT, DPT

## 2019-03-21 NOTE — ROUTINE PROCESS
Bedside and Verbal shift change report given to Enoch Fabian RN (oncoming nurse) by Meagan Chahal RN (offgoing nurse). Report included the following information SBAR, Kardex, Intake/Output, MAR, Recent Results, Med Rec Status and Cardiac Rhythm NSR.

## 2019-03-21 NOTE — ADT AUTH CERT NOTES
Gastrointestinal Bleeding, Upper - Care Day 8 (3/20/2019) by Irma Martinez  
 
   
Review Status Review Entered Completed 3/21/2019 13:47  
   
Criteria Review Care Day: 8 Care Date: 3/20/2019 Level of Care: Telemetry Guideline Day 2 Level Of Care (X) Floor Clinical Status  
(X) * Hypotension absent 3/21/2019 13:47:21 EDT by Guerita Weems   
96.9 71 20, bp 145/57, sat 87 to 98 room air. Seferino Muhammad wt 195 # (X) * Bleeding absent or reduced Activity (X) Activity as tolerated 3/21/2019 13:47:21 EDT by Guerita Weems   
ambulated in room Routes ( ) * Oral hydration, and tolerating diet Interventions  
(X) * NG tube absent   
(X) Hgb/Hct   
3/21/2019 13:47:21 EDT by Guerita Weems   
hh 7.6/ 24.6, chloride 111, cr 1.59, gfr est non aa 41. Medications ( ) Proton pump inhibitor 3/21/2019 13:47:21 EDT by Guerita Weems   
meds; uroxatral po q hs, norvasc po qd, fe po bid, diovan 160 mg and HCTZ and toprol xl po daily, lactulose 30 g po tid, exelon po bid, zocor po qd, januvia po ,   
  
  
  
  
  
* Milestone Additional Notes  
-----diet ; diabetic puree-pudding extremely thick   
-----ORDERS; AMB w assist, SCDs, aspiration precautions, fall prec, PT, OT, ST.   
-----DC plan ; Granddaughter present in room, asked questions about pt's status and d/c plans. Confirmed return to [facility]. CM explained swallowing issues, and consulted MD to speak with granddaughter or son.   
-----PT; Ambulated with PT who recommends SNF.   
  
------SLP  
ASSESSMENT:  
Pt was seen at bedside for follow up dysphagia management. Pt observed gagging/throat clearing after honey-thick liquids; improved with puree and pudding-thick liquids. Laryngeal elevation was weak/delayed to palpation. Pt completed samy and effortful swallows 5 rep x 1 with mod cues from SLP.  Rec diet change to puree with pudding-thick liquids, aspiration precautions, oral care TID and meds as tolerated. SLP available for repeat MBS per MD request. ? May benefit from GI to further assess esophageal motility? D/w RN, pt, and MD. [address] [place] [address] Dr will continue to follow. Rec:     
Puree diet with pudding-thick liquids Aspiration precautions HOB >45 during po intake, remain >30 for 30-45 minutes after po Small bites/sips; alternate liquid/solid with slow feeding rate Oral care TID Meds per pt preference OP SLP services upon DC from this facility  
      
Outcome: Not Progressing Towards Goal.   
  
-----------Charles Cole Pt sleeping soundly in chair during visit. SLP following pt and liquids downgraded to pudding thick today. Noted some episodes of nausea yesterday however per RN pt not c/o nausea today and consumed 100% of breakfast  
  
-------MEDICINE Patient has continued to have some coughing with oral intake. Sitting in chair at bedside currently, no new issues o/w.  
   
Assessment:  
Lower GI bleed secondary to stercoral ulcer Acute blood loss anemia HTN  
DM 2 Chronic constipation Advanced age    
Plan: D/W SLP this AM - plan to modify diet further and monitor intake.  If continues to exhibit difficulty with PO, may require GI consultation for further evaluation. Supportive care o/w.  Patient has SNF bed at [facility].  Disposition as soon as able. ALEXANDRA GRANADOS aware of continued issues with swallowing.  
  
  
   
Gastrointestinal Bleeding, Upper - Care Day 7 (3/19/2019) by Alhaji Allen  
 
   
Review Status Review Entered Completed 3/20/2019 16:05  
   
Criteria Review Care Day: 7 Care Date: 3/19/2019 Level of Care: Telemetry Guideline Day 2 Level Of Care (X) Floor Clinical Status  
(X) * Hypotension absent 3/20/2019 16:03:35 EDT by Tuan Brown   
97.5 72 16, bp 131/72, sat 95 RA.   
  
(X) * Bleeding absent or reduced Activity ( ) Activity as tolerated 3/20/2019 16:03:35 EDT by Tuan Brown not noted if up today Routes ( ) * Oral hydration, and tolerating diet 3/20/2019 16:05:35 EDT by Audelia Baker DIET Order is puree, honey thick 3/20/2019 16:03:35 EDT by Audelia Baker   
diabetic diet order noted. Intake quantity not noted Interventions  
(X) * NG tube absent   
(X) Hgb/Hct   
3/20/2019 16:03:35 EDT by Audelia Baker HH 7.3/ 23.8.   
  
  
3/20/2019 16:03:35 EDT by Audelia Baker Subject: Additional Clinical Information MEDS; UROXATRAL PO Q HS, norvasc po daily, Fe po bid, Diovan po daily, HCTZ po daily, lactulose 30 g po tid, toprol xl po daily, zofran iv 4 mg , exelon po bid, januvia po daily.   
   
  
3/20/2019 16:03:35 EDT by Audelia Baker Subject: Additional Clinical Information 3/18 XR SWALLOW FUNC VIDEO-Silent aspiration of nectar consistency.   
     
Penetration of solid consistency.   
     
Please see speech pathologist report for additional details and recommendations.   
  
  
   
  
  
  
  
* Milestone Additional Notes  
------------MEDICINE Some nausea this AM with poor intake. No BM recorded overnight but patient reports having one.  
   
Assessment:  
Lower GI bleed secondary to stercoral ulcer Acute blood loss anemia HTN  
DM 2 Chronic constipation Advanced age   
   
Plan:  
Increase lactulose to TID, add dulcolax suppository. Zofran added PRN. Continue diet as tolerated. PT/OT, mobilize as much as able/tolerated. Return to SNF as soon as medically stable and authorization obtained. ----------PER SLP Pt alert and able to follow all commands throughout eval. Pt educated on results of MBS completed 3/18/19. Pt tolerated honey thick liquid via cup sip in single sips with function laryngeal elevation to palpation and no overt s/sx of aspiration.  Pt tolerated puree with appropriate A-P transfer, large audible gag x1 trial which was resolved with smaller bites, and no overt s/sx of aspiration. Pt completed 1 set of 5 reps of oropharyngeal exercises with min-mod SLP assistance. Pt educated on completing these exercises twice daily, verbalized understanding. Rec continued puree diet with honey thick liquids, meds with honey thick or puree, aspiration precautions, and oral care TID. Discussed with pt, verbalized understanding. SLP to follow. -------------DC Plan ; Accepted to [facility], authorization received, MD contacted  
  
  
   
Gastrointestinal Bleeding, Upper - Care Day 5 (3/17/2019) by Rosa Isela Chávez  
 
   
Review Status Review Entered Completed 3/19/2019 15:25  
   
Criteria Review Care Day: 5 Care Date: 3/17/2019 Level of Care: Telemetry Guideline Day 2 Level Of Care (X) Floor Clinical Status  
(X) * Hypotension absent 3/19/2019 15:25:20 EDT by Kiersten Zaldivar  
  97.4   78    18, 185/76,   SAT 98 ra. (X) * Bleeding absent or reduced 3/19/2019 15:25:20 EDT by Kiersten Zaldivar  
  No bleeding today, Hgb stable PER GI  
  
  
Routes ( ) * Oral hydration, and tolerating diet 3/19/2019 15:25:20 EDT by Kiersten Zaldivar  
  meds   --apresoline 10 mg iv x 2, EXELON AND  Lactulose bid was held due to NPO. ...per MEDICINE- Dysphagia - new issue - MBS in AM by SLP Interventions  
(X) * NG tube absent  
(X) Hgb/Hct  
3/19/2019 15:25:20 EDT by Kiersten Zaldivar  
  hh 7.3/ 23.1, wbc 10.9, chloride 109, * Milestone Additional Notes  
------------MEDICINE Subjective:Patient has no more bleed Had some difficulty in eating yesterday No cp   
noSOB No NVD    
Assessment/Plan:   
- Dysphagia - new issue - MBS in AM by SLP   
-GI Bleed, ?diverticular  
- acute blood loss anemia   
- HTN  
- DM2  
- Dementia  
- h/o Prostate Cancer - Chronic constipation and food impaction   
 PLAN   
- GI bleed - lower GI bleed , stercoral ulcer - stable - need to avoid development of constipation - Speech work up should finish by tomorrow and then placement to SNF    
- patient will be transferred back to SNF once he is approved by [employer]   
   
------------PER GI;   
Impression:1. Hematochezia with blood loss anemia: s/p colonoscopy-suspected this is from stercoral ulcer-diverticular bleed is also possible, but less likely. Mass lesion/AVM is also less likely. No bleeding today, Hgb stable. 2. Chronic constipation 3. Dementia 4. Colon polyps-s/p polypectomy 5. H/o prostate cancer.    
Plan:1. [id number] [address] for discharge from my perspective 2. Needs chronic bowel regimen to make sure he does not get constipated-started on lactulose, can be up titrated as needed. 3. Watch for rebleeding 4. Can resume ASA/Antiplatelets as needed in 3-4 days. 5. Avoid Non aspirin NSAID's.   
  
--------------PER SLP  
 Speech Pathology Note   
Noted new order for swallow eval, chart reviewed.  Pt currently NPO, failed nursing swallow screen as pt with h/o dysphagia.  Upon deeper chart review, Pt was admitted to [facility] 3/1- 3/8/1919 due to [address].  During hospitalization, pt participated in [facility] with the following results noted by radiology: \"FINDINGS:  
Thin barium: Drinking from a cup: Oral and swallow delay with premature spillage. Silent aspiration. Nectar consistency: Drinking from a cup: Oral and swallow delay with premature spillage. Penetration to the vocal cords without evidence of aspiration. Honey consistency: Not given Pudding: Oral and swallow delay. Solid: Oral and swallow delay. \"  
   
Pt was placed on puree solids with nectar thick liquids.  Unable to find SLP notes related to above. Dali Turcios h/o silent aspiration, would rec MBS prior to initiation of any po trials/ feeds as that is the only way to determine silent aspiration and current swallow function.  Orders written. Candy Cooper- house SLP to complete MBS next business date.

## 2019-03-21 NOTE — PROGRESS NOTES
Problem: Dysphagia (Adult) Goal: *Acute Goals and Plan of Care (Insert Text) Description Patient will: 1. Tolerate PO trials with 0 s/s overt distress in 4/5 trials 2. Utilize compensatory swallow strategies/maneuvers (decrease bite/sip, size/rate, alt. liq/sol) with min cues in 4/5 trials 3. Perform oral-motor/laryngeal exercises to increase oropharyngeal swallow function with min cues 4. Complete an objective swallow study (i.e., MBSS) to assess swallow integrity, r/o aspiration, and determine of safest LRD, min A - met 3/18/19 Rec:    
Puree diet with pudding-thick liquids Aspiration precautions HOB >45 during po intake, remain >30 for 30-45 minutes after po Small bites/sips; alternate liquid/solid with slow feeding rate Oral care TID Meds per pt preference OP SLP services upon DC from this facility Outcome: Progressing Towards Goal 
 
SPEECH LANGUAGE PATHOLOGY DYSPHAGIA TREATMENT Patient: Eliecer Murray (75 y.o. male) Date: 3/21/2019 Diagnosis: GI bleed [K92.2] <principal problem not specified> Procedure(s) (LRB): 
COLONOSCOPY w/ polypectomies (N/A) 5 Days Post-Op Precautions: aspiration ASSESSMENT: 
Pt was seen at bedside for follow up dysphagia management. Nectar-thick liquids noted at bedside. Liquids removed and pt/floor staff educated on need for pudding-thick liquids. Pt observed tolerating puree and pudding-thick liquids with no overt s/sx of aspiration. Laryngeal elevation appeared weak to palpation. Called pt's granddaughter, Gloria Chowdary, and reviewed recommendations and s/sx of aspiration. Continue to rec puree diet with pudding-thick liquids, aspiration precautions, oral care TID, and meds in puree. Rec OP SLP services upon DC from this facility. D/w Dr. Rekha Peters. ST will continue to follow while in house. Progression toward goals: 
?         Improving appropriately and progressing toward goals ? Improving slowly and progressing toward goals ?         Not making progress toward goals and plan of care will be adjusted PLAN: 
Recommendations and Planned Interventions: See above Patient continues to benefit from skilled intervention to address the above impairments. Continue treatment per established plan of care. Discharge Recommendations:  Robert Paredes SUBJECTIVE:  
Patient stated ? I'm doing just fine? . 
 
OBJECTIVE:  
Cognitive and Communication Status: 
Neurologic State: Alert Orientation Level: Oriented X4 Cognition: Follows commands Perception: Appears intact Perseveration: No perseveration noted Safety/Judgement: Awareness of environment, Fall prevention Dysphagia Treatment: 
Oral Assessment: 
Oral Assessment Labial: No impairment Dentition: Upper dentures, Lower dentures Oral Hygiene: adequate Lingual: No impairment Velum: No impairment Mandible: No impairment P.O. Trials: 
        Patient Position: [facility] at [facility] Vocal quality prior to P.O.: No impairment Consistency Presented: pudding- thick liquid, Puree How Presented: Self-fed/presented, SLP-fed/presented, Cup/sip, Spoon Bolus Acceptance: No impairment Bolus Formation/Control: No impairment Propulsion: No impairment Oral Residue: None Initiation of Swallow: No impairment Laryngeal Elevation: weak Aspiration Signs/Symptoms: None Pharyngeal Phase Characteristics: None Effective Modifications: Small sips and bites Cues for Modifications: Minimal 
  
        Oral Phase Severity: No impairment Pharyngeal Phase Severity : mod-sev PAIN: 
Pt reports 0/10 pain or discomfort prior to tx. Pt reports 0/10 pain or discomfort post tx. After treatment:  
?              Patient left in no apparent distress sitting up in chair ? Patient left in no apparent distress in bed 
? Call bell left within reach ?              Nursing notified ? Caregiver present ? Bed alarm activated COMMUNICATION/EDUCATION:  
?              SLP educated pt with regard to compensatory swallow strategies and 
      aspiration/reflux precautions including: small bites/sips, 
      alternate liquids/solids, decrease feeding rate, HOB > 45 with all po, and 
                 upright in bed at 30 degrees after po for at least 45 
      minutes. Aida Raymundo

## 2019-03-21 NOTE — DISCHARGE SUMMARY
Discharge Summary    Patient: Rock Esposito MRN: [id number]  CSN: [id number]    YOB: 1929  Age: 80 y.o. Sex: male    DOA: 3/13/2019 LOS:  LOS: 8 days   Discharge Date: 3/21/2019     Admission Diagnoses:   GI bleed    Discharge Diagnoses:    Lower GI bleed secondary to stercoral ulcer - bleed resolved  Acute blood loss anemia  Colon polyposis   Severe diverticulosis without acute diverticulitis   Oropharyngeal dysphagia  HTN  DM 2  Chronic constipation  Advanced age       Discharge Condition: Stable    PHYSICAL EXAM  Visit Vitals  /74 (BP 1 Location: Right arm, BP Patient Position: At rest)   Pulse 71   Temp 97.2 °F (36.2 °C)   Resp 18   Ht 5' 8\" (1.727 m)   Wt 87.8 kg (193 lb 8 oz)   SpO2 100%   BMI 29.42 kg/m²       General: In NAD. HEENT: NCAT. Sclerae anicteric. EOMI. Lungs:  Clear, no wheezes. Effort nonlabored. Heart:  RRR. Abdomen: Soft, NTTP. Extremities: Warm, no ischemia. Psych:   Mood normal.    Neurologic:  Awake and alert, moves extremities spontaneously. Hospital Course:   See admission H&P for full details of HPI. Patient admitted to telemetry bed with GI in consultation for further evaluation and management of GI bleed. Patient underwent colonoscopy on 3/16 with findings as documented below. He has remained quite stable post procedure and has had no further acute blood loss. Most recent H/H on day of discharge is 7.6/25.1 and this has been stable for the past several days. Patient has required continued hospitalization for management of dysphagia. He was noted early on to have some fairly significant coughing/choking with oral intake. SLP evaluation was obtained and MBS was done with findings as documented below. Patient has been recommended for pureed diet with extremely thick/pudding thick liquids - this is extremely important for this patient.    He has been shown to do very well with pudding thick liquids but anything that is not thickened to this consistency may cause him some difficulty. SLP has also recommended continuing speech therapy on arrival to skilled nursing facility as he will likely benefit from continued treatment in this regard. PT/OT evaluations were obtained and patient has been recommended for subacute rehab prior to returning to assisted living. He is medically stable for discharge today with recommendations as outlined. Consults:   Gastroenterology      Significant Diagnostic Studies:  Colonoscopy:    Findings:   Rectum: Moderate sized internal hemorrhoids noted-no bleeding stigmata. 2 cm stercoral ulcer in rectum noted with clean base  Sigmoid: Severe diverticulosis  Descending Colon: Severe diverticulosis  Transverse Colon: Normal, but prep was suboptmial  Ascending Colon: 2 polyps seen 8 mm or so, removed by cold snare, few scattered diverticula; suboptimal prep  Cecum: 2 polyps-4 to 6 mm or so-removed by cold snare  Terminal Ileum: normal     Interventions:  As above     Specimen Removed:    ID Type Source Tests Collected by Time Destination   1 : Cecum polyps x2 and Ascending colon polyps x 2 Preservative Colon   Farzaneh Ferraro MD 3/16/2019 0912 Pathology         Complications: None.      EBL:  None.     Impression: Suspect the bleeding is from the stercoral ulcer. Self limited diverticular bleed is also possible. Prep was suboptimal, so polyps, or a flat mass lesion could have been missed, but thought to be less likely. No blood seen in the colon, stool was brown.     Recommendations:     1. Resume diet  2. Will need life long bowel regimen to make sure he does not get constipated. 3. Follow path. 4. Can be discharged once tolerating diet and no further bleeding noted.    5. Given age and co-morbidities, would recommend against further colonoscopy        MBS:  IMPRESSION:     Silent aspiration of nectar consistency.     Penetration of solid consistency.     Please see speech pathologist report for additional details and recommendations. Labs:  Results for Giana Lara (MRN [id number]) as of 3/21/2019 12:59   Ref. Range 3/20/2019 02:55   Sodium Latest Ref Range: 136 - 145 mmol/L 143   Potassium Latest Ref Range: 3.5 - 5.5 mmol/L 3.8   Chloride Latest Ref Range: 100 - 108 mmol/L 111 (H)   CO2 Latest Ref Range: 21 - 32 mmol/L 27   Anion gap Latest Ref Range: 3.0 - 18 mmol/L 5   Glucose Latest Ref Range: 74 - 99 mg/dL 99   BUN Latest Ref Range: 7.0 - 18 MG/DL 8   Creatinine Latest Ref Range: 0.6 - 1.3 MG/DL 1.59 (H)   BUN/Creatinine ratio Latest Ref Range: 12 - 20   5 (L)   Calcium Latest Ref Range: 8.5 - 10.1 MG/DL 8.7   GFR est non-AA Latest Ref Range: >60 ml/min/1.73m2 41 (L)   GFR est AA Latest Ref Range: >60 ml/min/1.73m2 50 (L)       Results for Giana Lara (MRN [id number]) as of 3/21/2019 12:59   Ref. Range 3/19/2019 09:55 3/19/2019 21:34 3/20/2019 09:10 3/20/2019 19:50 3/21/2019 09:50   HGB Latest Ref Range: 13.0 - 16.0 g/dL 7.7 (L) 7.3 (L) 7.8 (L) 7.6 (L) 7.6 (L)   HCT Latest Ref Range: 36.0 - 48.0 % 24.6 (L) 23.8 (L) 26.2 (L) 24.6 (L) 25.1 (L)         Discharge Medications:     Current Discharge Medication List      START taking these medications    Details   lactulose (CHRONULAC) 10 gram/15 mL solution Take 45 mL by mouth three (3) times daily as needed (constipation). Qty: 1 Bottle, Refills: 0         CONTINUE these medications which have NOT CHANGED    Details   montelukast (SINGULAIR) 10 mg tablet TAKE ONE TABLET BY MOUTH EVERY NIGHT AT BEDTIME      rivastigmine tartrate (EXELON) 1.5 mg capsule TAKE ONE CAPSULE BY MOUTH TWICE DAILY WITH MEALS      SITagliptin (JANUVIA) 100 mg tablet TAKE 1 TABLET BY MOUTH ONCE DAILY      FERROUS SULFATE (FEOSOL PO) Take  by mouth two (2) times a day. cloNIDine (CATAPRES-TTS-2) 0.2 mg/24 hr patch 1 Patch by TransDERmal route every seven (7) days. simvastatin (ZOCOR) 80 mg tablet Take 80 mg by mouth daily.       metoprolol-XL (TOPROL XL) 50 mg XL tablet Take  by mouth daily. valsartan-hydrochlorothiazide (DIOVAN HCT) 160-12.5 mg per tablet Take 1 Tab by mouth daily. alfuzosin SR (UROXATRAL) 10 mg SR tablet Take  by mouth nightly as needed. fexofenadine (ALLEGRA) 60 mg tablet Take 60 mg by mouth two (2) times a day. STOP taking these medications       HYDROcodone-acetaminophen (NORCO) 5-325 mg per tablet Comments:   Reason for Stopping:         ramipril (ALTACE) 5 mg capsule Comments:   Reason for Stopping:         amlodipine (NORVASC) 5 mg tablet Comments:   Reason for Stopping:         pioglitazone (ACTOS) 45 mg tablet Comments:   Reason for Stopping:         aspirin (ASPIRIN) 325 mg tablet Comments:   Reason for Stopping:               Activity: As tolerated    Diet:   Pureed diabetic diet with pudding-thick liquids (extremely important). Aspiration precautions  HOB >45 during po intake, remain >30 for 30-45 minutes after po   Small bites/sips; alternate liquid/solid with slow feeding rate   Oral care TID  Meds per pt preference        Disposition:  SNF    Follow-up: with PCP, Shruthi Salazar MD on discharge from skilled nursing facility. Minutes spent on discharge: >30 minutes spent coordinating this discharge. Ray Cox.  Riddhi Brink MD  [facility]

## 2019-03-21 NOTE — PROGRESS NOTES
Discharge order noted for today. Patient has been accepted to [facility] skilled nursing facility. Confirmed with Parul that bed is available today. Met with patient and pt's son and granddaughter and are agreeable to the transition plan today. [facility] authorization [facility] been obtained. ** Transport to facility has been arranged through [facility] time. Patient's discharge summary has been forwarded to skilled nursing facility via [facility]. Bedside RN, Manny Fothergill, has been updated to the transition plan. Discharge information has been updated on the AVS.  Please call report to [phone]. [facility] [phone]

## 2019-04-02 ENCOUNTER — HOME HEALTH ADMISSION (OUTPATIENT)
Dept: HOME HEALTH SERVICES | Facility: HOME HEALTH | Age: 84
End: 2019-04-02
Payer: MEDICARE

## 2019-04-05 ENCOUNTER — HOME CARE VISIT (OUTPATIENT)
Dept: SCHEDULING | Facility: HOME HEALTH | Age: 84
End: 2019-04-05
Payer: MEDICARE

## 2019-04-05 ENCOUNTER — HOME CARE VISIT (OUTPATIENT)
Dept: HOME HEALTH SERVICES | Facility: HOME HEALTH | Age: 84
End: 2019-04-05
Payer: MEDICARE

## 2019-04-05 VITALS
RESPIRATION RATE: 20 BRPM | OXYGEN SATURATION: 96 % | TEMPERATURE: 96.1 F | HEART RATE: 64 BPM | DIASTOLIC BLOOD PRESSURE: 70 MMHG | SYSTOLIC BLOOD PRESSURE: 160 MMHG

## 2019-04-05 PROCEDURE — 400013 HH SOC

## 2019-04-05 PROCEDURE — G0299 HHS/HOSPICE OF RN EA 15 MIN: HCPCS

## 2019-04-05 PROCEDURE — 3331090001 HH PPS REVENUE CREDIT

## 2019-04-05 PROCEDURE — 3331090002 HH PPS REVENUE DEBIT

## 2019-04-06 PROCEDURE — 3331090001 HH PPS REVENUE CREDIT

## 2019-04-06 PROCEDURE — 3331090002 HH PPS REVENUE DEBIT

## 2019-04-07 PROCEDURE — 3331090001 HH PPS REVENUE CREDIT

## 2019-04-07 PROCEDURE — 3331090002 HH PPS REVENUE DEBIT

## 2019-04-08 ENCOUNTER — HOME CARE VISIT (OUTPATIENT)
Dept: SCHEDULING | Facility: HOME HEALTH | Age: 84
End: 2019-04-08
Payer: MEDICARE

## 2019-04-08 ENCOUNTER — HOME CARE VISIT (OUTPATIENT)
Dept: HOME HEALTH SERVICES | Facility: HOME HEALTH | Age: 84
End: 2019-04-08
Payer: MEDICARE

## 2019-04-08 VITALS
DIASTOLIC BLOOD PRESSURE: 80 MMHG | TEMPERATURE: 96.5 F | HEART RATE: 55 BPM | SYSTOLIC BLOOD PRESSURE: 150 MMHG | RESPIRATION RATE: 18 BRPM | OXYGEN SATURATION: 94 %

## 2019-04-08 VITALS
SYSTOLIC BLOOD PRESSURE: 130 MMHG | RESPIRATION RATE: 16 BRPM | DIASTOLIC BLOOD PRESSURE: 90 MMHG | HEART RATE: 57 BPM | OXYGEN SATURATION: 96 %

## 2019-04-08 PROCEDURE — G0299 HHS/HOSPICE OF RN EA 15 MIN: HCPCS

## 2019-04-08 PROCEDURE — 3331090001 HH PPS REVENUE CREDIT

## 2019-04-08 PROCEDURE — 3331090002 HH PPS REVENUE DEBIT

## 2019-04-08 PROCEDURE — G0151 HHCP-SERV OF PT,EA 15 MIN: HCPCS

## 2019-04-09 ENCOUNTER — HOME CARE VISIT (OUTPATIENT)
Dept: HOME HEALTH SERVICES | Facility: HOME HEALTH | Age: 84
End: 2019-04-09
Payer: MEDICARE

## 2019-04-09 PROCEDURE — 3331090001 HH PPS REVENUE CREDIT

## 2019-04-09 PROCEDURE — 3331090002 HH PPS REVENUE DEBIT

## 2019-04-10 PROCEDURE — 3331090001 HH PPS REVENUE CREDIT

## 2019-04-10 PROCEDURE — 3331090002 HH PPS REVENUE DEBIT

## 2019-04-11 ENCOUNTER — HOME CARE VISIT (OUTPATIENT)
Dept: SCHEDULING | Facility: HOME HEALTH | Age: 84
End: 2019-04-11
Payer: MEDICARE

## 2019-04-11 VITALS
SYSTOLIC BLOOD PRESSURE: 146 MMHG | OXYGEN SATURATION: 98 % | DIASTOLIC BLOOD PRESSURE: 68 MMHG | HEART RATE: 77 BPM | RESPIRATION RATE: 16 BRPM | TEMPERATURE: 97.8 F

## 2019-04-11 PROCEDURE — G0299 HHS/HOSPICE OF RN EA 15 MIN: HCPCS

## 2019-04-11 PROCEDURE — 3331090002 HH PPS REVENUE DEBIT

## 2019-04-11 PROCEDURE — 3331090001 HH PPS REVENUE CREDIT

## 2019-04-12 PROCEDURE — 3331090002 HH PPS REVENUE DEBIT

## 2019-04-12 PROCEDURE — 3331090001 HH PPS REVENUE CREDIT

## 2019-04-13 PROCEDURE — 3331090002 HH PPS REVENUE DEBIT

## 2019-04-13 PROCEDURE — 3331090001 HH PPS REVENUE CREDIT

## 2019-04-14 PROCEDURE — 3331090002 HH PPS REVENUE DEBIT

## 2019-04-14 PROCEDURE — 3331090001 HH PPS REVENUE CREDIT

## 2019-04-15 ENCOUNTER — HOME CARE VISIT (OUTPATIENT)
Dept: SCHEDULING | Facility: HOME HEALTH | Age: 84
End: 2019-04-15
Payer: MEDICARE

## 2019-04-15 PROCEDURE — 3331090002 HH PPS REVENUE DEBIT

## 2019-04-15 PROCEDURE — G0157 HHC PT ASSISTANT EA 15: HCPCS

## 2019-04-15 PROCEDURE — 3331090001 HH PPS REVENUE CREDIT

## 2019-04-16 ENCOUNTER — HOME CARE VISIT (OUTPATIENT)
Dept: SCHEDULING | Facility: HOME HEALTH | Age: 84
End: 2019-04-16
Payer: MEDICARE

## 2019-04-16 VITALS
DIASTOLIC BLOOD PRESSURE: 85 MMHG | SYSTOLIC BLOOD PRESSURE: 147 MMHG | HEART RATE: 57 BPM | OXYGEN SATURATION: 99 % | RESPIRATION RATE: 16 BRPM | TEMPERATURE: 98.3 F

## 2019-04-16 PROCEDURE — 3331090002 HH PPS REVENUE DEBIT

## 2019-04-16 PROCEDURE — G0299 HHS/HOSPICE OF RN EA 15 MIN: HCPCS

## 2019-04-16 PROCEDURE — G0152 HHCP-SERV OF OT,EA 15 MIN: HCPCS

## 2019-04-16 PROCEDURE — 3331090001 HH PPS REVENUE CREDIT

## 2019-04-17 ENCOUNTER — HOME CARE VISIT (OUTPATIENT)
Dept: SCHEDULING | Facility: HOME HEALTH | Age: 84
End: 2019-04-17
Payer: MEDICARE

## 2019-04-17 VITALS
HEART RATE: 55 BPM | TEMPERATURE: 97.4 F | DIASTOLIC BLOOD PRESSURE: 76 MMHG | OXYGEN SATURATION: 97 % | SYSTOLIC BLOOD PRESSURE: 174 MMHG

## 2019-04-17 VITALS
RESPIRATION RATE: 17 BRPM | DIASTOLIC BLOOD PRESSURE: 64 MMHG | RESPIRATION RATE: 18 BRPM | OXYGEN SATURATION: 98 % | HEART RATE: 58 BPM | TEMPERATURE: 98.1 F | TEMPERATURE: 97.7 F | OXYGEN SATURATION: 97 % | SYSTOLIC BLOOD PRESSURE: 139 MMHG | SYSTOLIC BLOOD PRESSURE: 124 MMHG | HEART RATE: 67 BPM | DIASTOLIC BLOOD PRESSURE: 75 MMHG

## 2019-04-17 PROCEDURE — G0157 HHC PT ASSISTANT EA 15: HCPCS

## 2019-04-17 PROCEDURE — 3331090001 HH PPS REVENUE CREDIT

## 2019-04-17 PROCEDURE — 3331090002 HH PPS REVENUE DEBIT

## 2019-04-17 PROCEDURE — G0153 HHCP-SVS OF S/L PATH,EA 15MN: HCPCS

## 2019-04-18 ENCOUNTER — HOME CARE VISIT (OUTPATIENT)
Dept: SCHEDULING | Facility: HOME HEALTH | Age: 84
End: 2019-04-18
Payer: MEDICARE

## 2019-04-18 VITALS
SYSTOLIC BLOOD PRESSURE: 187 MMHG | TEMPERATURE: 97.4 F | DIASTOLIC BLOOD PRESSURE: 77 MMHG | OXYGEN SATURATION: 96 % | HEART RATE: 54 BPM

## 2019-04-18 VITALS
DIASTOLIC BLOOD PRESSURE: 77 MMHG | TEMPERATURE: 98.2 F | OXYGEN SATURATION: 98 % | HEART RATE: 53 BPM | RESPIRATION RATE: 16 BRPM | SYSTOLIC BLOOD PRESSURE: 139 MMHG

## 2019-04-18 PROCEDURE — G0299 HHS/HOSPICE OF RN EA 15 MIN: HCPCS

## 2019-04-18 PROCEDURE — 3331090003 HH PPS REVENUE ADJ

## 2019-04-18 PROCEDURE — 3331090002 HH PPS REVENUE DEBIT

## 2019-04-18 PROCEDURE — 3331090001 HH PPS REVENUE CREDIT

## 2019-04-18 PROCEDURE — G0153 HHCP-SVS OF S/L PATH,EA 15MN: HCPCS

## 2019-04-19 PROCEDURE — 3331090002 HH PPS REVENUE DEBIT

## 2019-04-19 PROCEDURE — 3331090001 HH PPS REVENUE CREDIT

## 2019-08-14 ENCOUNTER — OFFICE VISIT (OUTPATIENT)
Dept: CARDIOLOGY CLINIC | Age: 84
End: 2019-08-14

## 2019-08-14 VITALS
HEART RATE: 60 BPM | BODY MASS INDEX: 30.01 KG/M2 | OXYGEN SATURATION: 97 % | DIASTOLIC BLOOD PRESSURE: 80 MMHG | HEIGHT: 68 IN | WEIGHT: 198 LBS | SYSTOLIC BLOOD PRESSURE: 140 MMHG

## 2019-08-14 DIAGNOSIS — R00.1 BRADYCARDIA: Primary | ICD-10-CM

## 2019-08-14 RX ORDER — HYDRALAZINE HYDROCHLORIDE 25 MG/1
25 TABLET, FILM COATED ORAL 4 TIMES DAILY
Status: ON HOLD | COMMUNITY
End: 2019-09-17 | Stop reason: SDUPTHER

## 2019-08-14 RX ORDER — CLONIDINE 0.3 MG/24H
1 PATCH, EXTENDED RELEASE TRANSDERMAL
Status: ON HOLD | COMMUNITY
End: 2019-09-17 | Stop reason: SDUPTHER

## 2019-08-14 RX ORDER — SIMVASTATIN 40 MG/1
40 TABLET, FILM COATED ORAL
Status: ON HOLD | COMMUNITY
End: 2019-09-17 | Stop reason: SDUPTHER

## 2019-08-14 RX ORDER — ACETAMINOPHEN 325 MG/1
325 TABLET ORAL
Status: ON HOLD | COMMUNITY
End: 2019-09-17 | Stop reason: SDUPTHER

## 2019-08-14 NOTE — PROGRESS NOTES
Reina Manzano    Establish with cardiologist    BLANCO    Reina Manzano is a 80 y.o. referred for HTN and low HRs. I personally obtained and reviewed available records. I see pt was last hospitalized in 3/2019 for a LGIB/ acute blood loss anemia. He has some remote history of CAD but hasn't been seen by a cardiologist in years. I was able to find an echocardiogram report from 2009 with normal LV function 65%, no significant valvular pathology but abnormal diastolic parameters. More recently he was hospitalized [facility] 4/6/19 for acute mental status change: He had an echo that was normal. There was questionable AV block in route by EMS but during hospitalization only sinus lilia documented. Pt himself has dementia, had an acute CVA (embolic suspected) as has known moderate carotid stenosis. He doesn't know why he is here today. Came with no records. A family friend brings him who also knows very little details. He tells me he feels fine but if he needs a pacemaker he would do it. Unclear his code status/ who helps with decision making (seems to be son who is a )? Past Medical History:   Diagnosis Date    Arthritis     Cancer ([address]     Coronary atherosclerosis of native coronary artery     AS DESCRIBED IN CATH CARDIAC CATH IN 9/07 SHOWS 80% DISTAL LAD AND 40% PROXIMAL LAD DISEASE . HE IS ON MEDICAL TREATMENT.  ASA AND B-BLOCKER 3/10 RENAL ARTERY DUPLEX : NL RT RENAL ARTERY WITH < 60% STENOSIS OF LEFT RENAL ARTERY    Diabetes ([address]     Glaucoma     Hypertension     Impotence of organic origin     Malignant neoplasm of prostate ([address]     Other and unspecified hyperlipidemia        Past Surgical History:   Procedure Laterality Date    COLONOSCOPY N/A 3/16/2019    COLONOSCOPY w/ polypectomies performed by Eloise Aceves MD at [address]  3/16/2019         Pauly Ramirez  3/16/2019            Current Outpatient Medications   Medication Sig Dispense Refill    hydrALAZINE (APRESOLINE) 25 mg tablet Take 25 mg by mouth four (4) times daily.  acetaminophen (TYLENOL) 325 mg tablet Take 325 mg by mouth every four (4) hours as needed for Pain.  cloNIDine (CATAPRES) 0.3 mg/24 hr 1 Patch by TransDERmal route every seven (7) days.  simvastatin (ZOCOR) 40 mg tablet Take 40 mg by mouth nightly.  lactulose (CHRONULAC) 10 gram/15 mL solution Take 45 mL by mouth three (3) times daily as needed (constipation). 1 Bottle 0    montelukast (SINGULAIR) 10 mg tablet TAKE ONE TABLET BY MOUTH EVERY NIGHT AT BEDTIME      rivastigmine tartrate (EXELON) 1.5 mg capsule TAKE ONE CAPSULE BY MOUTH TWICE DAILY WITH MEALS      SITagliptin (JANUVIA) 100 mg tablet TAKE 1 TABLET BY MOUTH ONCE DAILY      FERROUS SULFATE (FEOSOL PO) Take 325 mg by mouth two (2) times a day. for anemia        valsartan-hydrochlorothiazide (DIOVAN HCT) 160-12.5 mg per tablet Take 1 Tab by mouth daily.  alfuzosin SR (UROXATRAL) 10 mg SR tablet Take 10 mg by mouth nightly as needed for Other (prostate).  fexofenadine (ALLEGRA) 60 mg tablet Take 60 mg by mouth two (2) times a day.          No Known Allergies    Social History     Socioeconomic History    Marital status:      Spouse name: Not on file    Number of children: Not on file    Years of education: Not on file    Highest education level: Not on file   Occupational History    Not on file   Social Needs    Financial resource strain: Not on file    Food insecurity:     Worry: Not on file     Inability: Not on file    Transportation needs:     Medical: Not on file     Non-medical: Not on file   Tobacco Use    Smoking status: Never Smoker    Smokeless tobacco: Never Used   Substance and Sexual Activity    Alcohol use: No    Drug use: Not on file    Sexual activity: Not on file   Lifestyle    Physical activity:     Days per week: Not on file     Minutes per session: Not on file    Stress: Not on file   Relationships    Social connections:     Talks on phone: Not on file     Gets together: Not on file     Attends Religion service: Not on file     Active member of club or organization: Not on file     Attends meetings of clubs or organizations: Not on file     Relationship status: Not on file    Intimate partner violence:     Fear of current or ex partner: Not on file     Emotionally abused: Not on file     Physically abused: Not on file     Forced sexual activity: Not on file   Other Topics Concern    Not on file   Social History Narrative    Not on file        The patient has a family history of    Review of Systems    14 pt Review of Systems is negative unless otherwise mentioned in the HPI. Wt Readings from Last 3 Encounters:   08/14/19 89.8 kg (198 lb)   03/21/19 87.8 kg (193 lb 8 oz)   01/23/18 81.6 kg (180 lb)     Temp Readings from Last 3 Encounters:   04/18/19 97.4 °F (36.3 °C)   04/18/19 98.2 °F (36.8 °C)   04/17/19 97.4 °F (36.3 °C)     BP Readings from Last 3 Encounters:   08/14/19 140/80   04/18/19 187/77   04/18/19 139/77     Pulse Readings from Last 3 Encounters:   08/14/19 60   04/18/19 (!) 54   04/18/19 (!) 53            Physical Exam:    Visit Vitals  /80 (BP 1 Location: Left arm, BP Patient Position: Sitting)   Pulse 60   Ht 5' 8\" (1.727 m)   Wt 89.8 kg (198 lb)   SpO2 97%   BMI 30.11 kg/m²      Physical Exam   Constitutional: He is oriented to person, place, and time. He appears well-developed and well-nourished. HENT:   Head: Normocephalic and atraumatic. Eyes: Pupils are equal, round, and reactive to light. EOM are normal. No scleral icterus. Neck: No JVD present. Cardiovascular: Normal rate, regular rhythm, normal heart sounds and intact distal pulses. Exam reveals no gallop and no friction rub. No murmur heard. Pulmonary/Chest: Effort normal and breath sounds normal. No respiratory distress. He has no wheezes. He has no rales.  He exhibits no tenderness. Abdominal: Soft. Bowel sounds are normal.   Musculoskeletal: He exhibits no edema. Neurological: He is alert and oriented to person, place, and time. Skin: Skin is warm and dry. No rash noted. Psychiatric: He has a normal mood and affect. EKG today shows: 60 bpm Second degree AVB (Mobitz type II), Wide QRS/ RBBB appearance    Impression and Plan:  Melissa Barnett is a 80 y.o. with:    1.) Sinus bradycardia, with suspected intermittent AVB  2.) Recent acute CVA  3.) Carotid disease  4.) Remote CAD, details unknown  5.) Normal LV function  6.) Dementia/ advanced age    3.) Try to hold BB and will repeat ekg, changes could be transient/ reversible- since asymptomatic would try this first  2.) Obtained records after patient left, very little history could be taken from pt, and if PPM needed would need to discuss with son for appropriate consent  3.) Pt would be higher risk for PPM due to his advanced age/ multiple comorbidities but told me he would want to have it done if needed  4.) Pending on repeat ekg next visit, can decide if MCT warranted to help further decision making  5.) RTC 6-8 weeks    As pt seems asymptomatic and hospital records did not mention AVB, I think we should first try to hold BB. This seems transient for him. Difficult to say if supra/ infra his but QRS is wide on todays ekg. I cant see prior ekgs from hospital but the reports read sinus lilia with narrow QRS/ no previous RBBB etc.    But unlike SSS, high degree AVB is an indication for PPM as it shows significant conduction system disease. Further recs to follow. I wrote my plan down for pt's family friend to discuss with pt's son who can call me if questions or further details. Thank you for allowing me to participate in the care of your patient, please do not hesitate to call with questions or concerns.     Kindest Regards,    Alexi Calderon, DO

## 2019-08-14 NOTE — PATIENT INSTRUCTIONS
Hold metoprolol Get records from recent hospital stay Follow up 6-8 weeks ( facility soon contact office if patients has another syncopal episode)

## 2019-09-15 ENCOUNTER — APPOINTMENT (OUTPATIENT)
Dept: CT IMAGING | Age: 84
DRG: 069 | End: 2019-09-15
Attending: PHYSICIAN ASSISTANT
Payer: MEDICARE

## 2019-09-15 ENCOUNTER — HOSPITAL ENCOUNTER (INPATIENT)
Age: 84
LOS: 3 days | Discharge: HOME HEALTH | DRG: 069 | End: 2019-09-18
Attending: EMERGENCY MEDICINE | Admitting: HOSPITALIST
Payer: MEDICARE

## 2019-09-15 DIAGNOSIS — G45.9 TIA (TRANSIENT ISCHEMIC ATTACK): Primary | ICD-10-CM

## 2019-09-15 LAB
AMPHET UR QL SCN: NEGATIVE
ANION GAP SERPL CALC-SCNC: 9 MMOL/L (ref 3–18)
ATRIAL RATE: 53 BPM
BARBITURATES UR QL SCN: NEGATIVE
BASOPHILS # BLD: 0.1 K/UL (ref 0–0.1)
BASOPHILS NFR BLD: 1 % (ref 0–2)
BENZODIAZ UR QL: NEGATIVE
BUN SERPL-MCNC: 21 MG/DL (ref 7–18)
BUN/CREAT SERPL: 11 (ref 12–20)
CALCIUM SERPL-MCNC: 9.2 MG/DL (ref 8.5–10.1)
CALCULATED P AXIS, ECG09: 66 DEGREES
CALCULATED R AXIS, ECG10: -25 DEGREES
CALCULATED T AXIS, ECG11: 10 DEGREES
CANNABINOIDS UR QL SCN: NEGATIVE
CHLORIDE SERPL-SCNC: 106 MMOL/L (ref 100–111)
CK MB CFR SERPL CALC: 1.7 % (ref 0–4)
CK MB SERPL-MCNC: 1.3 NG/ML (ref 5–25)
CK SERPL-CCNC: 75 U/L (ref 39–308)
CO2 SERPL-SCNC: 25 MMOL/L (ref 21–32)
COCAINE UR QL SCN: NEGATIVE
CREAT SERPL-MCNC: 1.83 MG/DL (ref 0.6–1.3)
DIAGNOSIS, 93000: NORMAL
DIFFERENTIAL METHOD BLD: ABNORMAL
EOSINOPHIL # BLD: 0.2 K/UL (ref 0–0.4)
EOSINOPHIL NFR BLD: 3 % (ref 0–5)
ERYTHROCYTE [DISTWIDTH] IN BLOOD BY AUTOMATED COUNT: 13.3 % (ref 11.6–14.5)
GLUCOSE BLD STRIP.AUTO-MCNC: 139 MG/DL (ref 70–110)
GLUCOSE BLD STRIP.AUTO-MCNC: 187 MG/DL (ref 70–110)
GLUCOSE SERPL-MCNC: 234 MG/DL (ref 74–99)
HCT VFR BLD AUTO: 34.6 % (ref 36–48)
HDSCOM,HDSCOM: NORMAL
HGB BLD-MCNC: 10.8 G/DL (ref 13–16)
LYMPHOCYTES # BLD: 1.2 K/UL (ref 0.9–3.6)
LYMPHOCYTES NFR BLD: 15 % (ref 21–52)
MCH RBC QN AUTO: 29.3 PG (ref 24–34)
MCHC RBC AUTO-ENTMCNC: 31.2 G/DL (ref 31–37)
MCV RBC AUTO: 93.8 FL (ref 74–97)
METHADONE UR QL: NEGATIVE
MONOCYTES # BLD: 0.5 K/UL (ref 0.05–1.2)
MONOCYTES NFR BLD: 6 % (ref 3–10)
NEUTS SEG # BLD: 6.1 K/UL (ref 1.8–8)
NEUTS SEG NFR BLD: 75 % (ref 40–73)
OPIATES UR QL: NEGATIVE
P-R INTERVAL, ECG05: 234 MS
PCP UR QL: NEGATIVE
PLATELET # BLD AUTO: 334 K/UL (ref 135–420)
PMV BLD AUTO: 9.5 FL (ref 9.2–11.8)
POTASSIUM SERPL-SCNC: 4.1 MMOL/L (ref 3.5–5.5)
Q-T INTERVAL, ECG07: 504 MS
QRS DURATION, ECG06: 158 MS
QTC CALCULATION (BEZET), ECG08: 472 MS
RBC # BLD AUTO: 3.69 M/UL (ref 4.7–5.5)
SODIUM SERPL-SCNC: 140 MMOL/L (ref 136–145)
TROPONIN I SERPL-MCNC: 0.02 NG/ML (ref 0–0.04)
VENTRICULAR RATE, ECG03: 53 BPM
WBC # BLD AUTO: 8.1 K/UL (ref 4.6–13.2)

## 2019-09-15 PROCEDURE — 77030037878 HC DRSG MEPILEX >48IN BORD MOLN -B

## 2019-09-15 PROCEDURE — 74011250636 HC RX REV CODE- 250/636: Performed by: PHYSICIAN ASSISTANT

## 2019-09-15 PROCEDURE — 82550 ASSAY OF CK (CPK): CPT

## 2019-09-15 PROCEDURE — 74011250636 HC RX REV CODE- 250/636: Performed by: HOSPITALIST

## 2019-09-15 PROCEDURE — 74011250637 HC RX REV CODE- 250/637: Performed by: PHYSICIAN ASSISTANT

## 2019-09-15 PROCEDURE — 80048 BASIC METABOLIC PNL TOTAL CA: CPT

## 2019-09-15 PROCEDURE — 82962 GLUCOSE BLOOD TEST: CPT

## 2019-09-15 PROCEDURE — 99285 EMERGENCY DEPT VISIT HI MDM: CPT

## 2019-09-15 PROCEDURE — 93005 ELECTROCARDIOGRAM TRACING: CPT

## 2019-09-15 PROCEDURE — 65660000000 HC RM CCU STEPDOWN

## 2019-09-15 PROCEDURE — 80307 DRUG TEST PRSMV CHEM ANLYZR: CPT

## 2019-09-15 PROCEDURE — 85025 COMPLETE CBC W/AUTO DIFF WBC: CPT

## 2019-09-15 PROCEDURE — 70450 CT HEAD/BRAIN W/O DYE: CPT

## 2019-09-15 PROCEDURE — 74011000250 HC RX REV CODE- 250: Performed by: HOSPITALIST

## 2019-09-15 RX ORDER — GUAIFENESIN 100 MG/5ML
81 LIQUID (ML) ORAL DAILY
Status: DISCONTINUED | OUTPATIENT
Start: 2019-09-16 | End: 2019-09-18 | Stop reason: HOSPADM

## 2019-09-15 RX ORDER — LABETALOL HCL 20 MG/4 ML
5 SYRINGE (ML) INTRAVENOUS
Status: DISCONTINUED | OUTPATIENT
Start: 2019-09-15 | End: 2019-09-18 | Stop reason: HOSPADM

## 2019-09-15 RX ORDER — SODIUM CHLORIDE 0.9 % (FLUSH) 0.9 %
5-40 SYRINGE (ML) INJECTION EVERY 8 HOURS
Status: DISCONTINUED | OUTPATIENT
Start: 2019-09-15 | End: 2019-09-18 | Stop reason: HOSPADM

## 2019-09-15 RX ORDER — SODIUM CHLORIDE 0.9 % (FLUSH) 0.9 %
5-40 SYRINGE (ML) INJECTION AS NEEDED
Status: DISCONTINUED | OUTPATIENT
Start: 2019-09-15 | End: 2019-09-18 | Stop reason: HOSPADM

## 2019-09-15 RX ORDER — ONDANSETRON 2 MG/ML
4 INJECTION INTRAMUSCULAR; INTRAVENOUS
Status: DISCONTINUED | OUTPATIENT
Start: 2019-09-15 | End: 2019-09-18 | Stop reason: HOSPADM

## 2019-09-15 RX ORDER — ASPIRIN 325 MG
325 TABLET ORAL
Status: COMPLETED | OUTPATIENT
Start: 2019-09-15 | End: 2019-09-15

## 2019-09-15 RX ORDER — ATORVASTATIN CALCIUM 40 MG/1
80 TABLET, FILM COATED ORAL
Status: DISCONTINUED | OUTPATIENT
Start: 2019-09-15 | End: 2019-09-18 | Stop reason: HOSPADM

## 2019-09-15 RX ORDER — ACETAMINOPHEN 325 MG/1
650 TABLET ORAL
Status: DISCONTINUED | OUTPATIENT
Start: 2019-09-15 | End: 2019-09-18 | Stop reason: HOSPADM

## 2019-09-15 RX ORDER — HEPARIN SODIUM 5000 [USP'U]/ML
5000 INJECTION, SOLUTION INTRAVENOUS; SUBCUTANEOUS EVERY 8 HOURS
Status: DISCONTINUED | OUTPATIENT
Start: 2019-09-15 | End: 2019-09-18 | Stop reason: HOSPADM

## 2019-09-15 RX ORDER — SODIUM CHLORIDE 9 MG/ML
75 INJECTION, SOLUTION INTRAVENOUS CONTINUOUS
Status: DISCONTINUED | OUTPATIENT
Start: 2019-09-15 | End: 2019-09-16

## 2019-09-15 RX ADMIN — HEPARIN SODIUM 5000 UNITS: 5000 INJECTION INTRAVENOUS; SUBCUTANEOUS at 22:00

## 2019-09-15 RX ADMIN — Medication 10 ML: at 22:42

## 2019-09-15 RX ADMIN — SODIUM CHLORIDE 1000 ML: 900 INJECTION, SOLUTION INTRAVENOUS at 10:58

## 2019-09-15 RX ADMIN — ASPIRIN 325 MG: 325 TABLET, FILM COATED ORAL at 10:45

## 2019-09-15 RX ADMIN — FAMOTIDINE 20 MG: 10 INJECTION INTRAVENOUS at 22:42

## 2019-09-15 RX ADMIN — SODIUM CHLORIDE 75 ML/HR: 900 INJECTION, SOLUTION INTRAVENOUS at 22:41

## 2019-09-15 NOTE — ED NOTES
Nursing supervisor, Darcy Cadet, contacted regarding bed status update. Patient has remained stable throughout ED stay. Informed that awaiting discharges for bed availability. [facility] and [employer] made aware of update.

## 2019-09-15 NOTE — ED NOTES
Pt/family not wanting patient to be admitted to [facility], wanting admission to [facility]. In dept conversation by [facility] with family and patient, however still wanting admission to [facility].  Nursing supervisor contacted to discuss this, agree to leave in que until able to establish definite placement at a [facility] facility due to patient/family request.

## 2019-09-15 NOTE — ED PROVIDER NOTES
EMERGENCY DEPARTMENT HISTORY AND PHYSICAL EXAM    Date: 9/15/2019  Patient Name: Kassandra Samson    History of Presenting Illness       History Provided By: Patient, EMS, nursing home    Chief Complaint: Facial droop and left-sided extremity weakness  Duration: Started at 945  Timing: Acute and resolving  Location: Left-sided facial droop and left upper extremity weakness  Quality: Flaccid  Severity: Severe  Modifying Factors: None  Associated Symptoms: none       Additional History (Context): Kassandra Samson is a 80 y.o. male with a history of hypertension, diabetes, hyperlipidemia, CAD who presents today for left-sided facial droop and left-sided extremity weakness. Patient lives at a nursing home and was witnessed by staff to be walking down the stairs when he started to slump over to the left side. Patient did not actually fall or hit his head. EMS on scene did verify that there was significant left-sided facial droop in the left upper extremity was flaccid. EMS stated that in route the symptoms began resolving and confirmed that upon arrival all symptoms had resolved at the time of my neuro exam.  Patient states he feels fine and has no symptoms at this time. PCP: Shabnam Nguyen MD    Current Outpatient Medications   Medication Sig Dispense Refill    hydrALAZINE (APRESOLINE) 25 mg tablet Take 25 mg by mouth four (4) times daily.  acetaminophen (TYLENOL) 325 mg tablet Take 325 mg by mouth every four (4) hours as needed for Pain.  cloNIDine (CATAPRES) 0.3 mg/24 hr 1 Patch by TransDERmal route every seven (7) days.  simvastatin (ZOCOR) 40 mg tablet Take 40 mg by mouth nightly.  lactulose (CHRONULAC) 10 gram/15 mL solution Take 45 mL by mouth three (3) times daily as needed (constipation).  1 Bottle 0    montelukast (SINGULAIR) 10 mg tablet TAKE ONE TABLET BY MOUTH EVERY NIGHT AT BEDTIME      rivastigmine tartrate (EXELON) 1.5 mg capsule TAKE ONE CAPSULE BY MOUTH TWICE DAILY WITH MEALS      SITagliptin (JANUVIA) 100 mg tablet TAKE 1 TABLET BY MOUTH ONCE DAILY      FERROUS SULFATE (FEOSOL PO) Take 325 mg by mouth two (2) times a day. for anemia        valsartan-hydrochlorothiazide (DIOVAN HCT) 160-12.5 mg per tablet Take 1 Tab by mouth daily.  alfuzosin SR (UROXATRAL) 10 mg SR tablet Take 10 mg by mouth nightly as needed for Other (prostate).  fexofenadine (ALLEGRA) 60 mg tablet Take 60 mg by mouth two (2) times a day. Past History     Past Medical History:  Past Medical History:   Diagnosis Date    Arthritis     [address] [address]     Coronary atherosclerosis of native coronary artery     AS DESCRIBED IN CATH CARDIAC CATH IN 9/07 SHOWS 80% DISTAL LAD AND 40% PROXIMAL LAD DISEASE . HE IS ON MEDICAL TREATMENT. ASA AND B-BLOCKER 3/10 RENAL ARTERY DUPLEX : NL RT RENAL ARTERY WITH < 60% STENOSIS OF LEFT RENAL ARTERY    Diabetes [address] [facility]     Glaucoma     Hypertension     Impotence of organic origin     Malignant neoplasm of prostate ([facility]     Other and unspecified hyperlipidemia        Past Surgical History:  Past Surgical History:   Procedure Laterality Date    COLONOSCOPY N/A 3/16/2019    COLONOSCOPY w/ polypectomies performed by Roseline [facility]ger [facility] at [facility]  3/16/2019         COLONOSCOPY,MYRON Kim  3/16/2019            Family History:  Family History   Problem Relation Age of Onset    Diabetes Other     Heart Disease Other        Social History:  Social History     Tobacco Use    Smoking status: Never Smoker    Smokeless tobacco: Never Used   Substance Use Topics    Alcohol use: No    Drug use: Not on file       Allergies:  No Known Allergies      Review of Systems   Review of Systems   Constitutional: Negative for chills and fever. HENT: Negative for congestion, rhinorrhea and sore throat. Respiratory: Negative for cough and shortness of breath. Cardiovascular: Negative for chest pain. Gastrointestinal: Negative for abdominal pain, blood in stool, constipation, diarrhea, nausea and vomiting. Genitourinary: Negative for dysuria, frequency and hematuria. Musculoskeletal: Negative for back pain and myalgias. Skin: Negative for rash and wound. Neurological: Positive for facial asymmetry and weakness. Negative for dizziness and headaches. All other systems reviewed and are negative. All Other Systems Negative  Physical Exam     Vitals:    09/15/19 1033 09/15/19 1053 09/15/19 1100 09/15/19 1143   BP:  104/44 94/50 127/50   Pulse:  (!) 51 (!) 53 (!) 55   Resp:  18 14 15   Temp:  97.5 °F (36.4 °C)     SpO2: 94%  97% 96%   Weight:  83.9 kg (185 lb)     Height:  5' 10\" (1.778 m)       Physical Exam   Constitutional: He is oriented to person, place, and time. He appears well-developed and well-nourished. No distress. HENT:   Head: Normocephalic and atraumatic. Eyes: Pupils are equal, round, and reactive to light. Conjunctivae and EOM are normal. Right eye exhibits no nystagmus. Left eye exhibits no nystagmus. Neck: Normal range of motion. Neck supple. Cardiovascular: Normal rate, regular rhythm and normal heart sounds. Pulmonary/Chest: Effort normal and breath sounds normal. No respiratory distress. He exhibits no tenderness. Abdominal: Soft. Bowel sounds are normal. He exhibits no distension. There is no tenderness. There is no rebound and no guarding. Musculoskeletal: Normal range of motion. He exhibits no edema or deformity. Neurological: He is alert and oriented to person, place, and time. He has normal strength. He is not disoriented. No cranial nerve deficit or sensory deficit. Coordination normal. GCS eye subscore is 4. GCS verbal subscore is 5. GCS motor subscore is 6. No extremity weakness,  strength is equal, no facial droop or dysarthria, alert and oriented x4   Skin: Skin is warm and dry. He is not diaphoretic. Psychiatric: He has a normal mood and affect. His behavior is normal.   Nursing note and vitals reviewed. Diagnostic Study Results     Labs -     Recent Results (from the past 12 hour(s))   METABOLIC PANEL, BASIC    Collection Time: 09/15/19 10:30 AM   Result Value Ref Range    Sodium 140 136 - 145 mmol/L    Potassium 4.1 3.5 - 5.5 mmol/L    Chloride 106 100 - 111 mmol/L    CO2 25 21 - 32 mmol/L    Anion gap 9 3.0 - 18 mmol/L    Glucose 234 (H) 74 - 99 mg/dL    BUN 21 (H) 7.0 - 18 MG/DL    Creatinine 1.83 (H) 0.6 - 1.3 MG/DL    BUN/Creatinine ratio 11 (L) 12 - 20      GFR est AA 42 (L) >60 ml/min/1.73m2    GFR est non-AA 35 (L) >60 ml/min/1.73m2    Calcium 9.2 8.5 - 10.1 MG/DL   CBC WITH AUTOMATED DIFF    Collection Time: 09/15/19 10:30 AM   Result Value Ref Range    WBC 8.1 4.6 - 13.2 K/uL    RBC 3.69 (L) 4.70 - 5.50 M/uL    HGB 10.8 (L) 13.0 - 16.0 g/dL    HCT 34.6 (L) 36.0 - 48.0 %    MCV 93.8 74.0 - 97.0 FL    MCH 29.3 24.0 - 34.0 PG    MCHC 31.2 31.0 - 37.0 g/dL    RDW 13.3 11.6 - 14.5 %    PLATELET 210 688 - 273 K/uL    MPV 9.5 9.2 - 11.8 FL    NEUTROPHILS 75 (H) 40 - 73 %    LYMPHOCYTES 15 (L) 21 - 52 %    MONOCYTES 6 3 - 10 %    EOSINOPHILS 3 0 - 5 %    BASOPHILS 1 0 - 2 %    ABS. NEUTROPHILS 6.1 1.8 - 8.0 K/UL    ABS. LYMPHOCYTES 1.2 0.9 - 3.6 K/UL    ABS. MONOCYTES 0.5 0.05 - 1.2 K/UL    ABS. EOSINOPHILS 0.2 0.0 - 0.4 K/UL    ABS.  BASOPHILS 0.1 0.0 - 0.1 K/UL    DF AUTOMATED     CARDIAC PANEL,(CK, CKMB & TROPONIN)    Collection Time: 09/15/19 10:30 AM   Result Value Ref Range    CK 75 39 - 308 U/L    CK - MB 1.3 <3.6 ng/ml    CK-MB Index 1.7 0.0 - 4.0 %    Troponin-I, QT 0.02 0.0 - 0.045 NG/ML   EKG, 12 LEAD, INITIAL    Collection Time: 09/15/19 10:38 AM   Result Value Ref Range    Ventricular Rate 53 BPM    Atrial Rate 53 BPM    P-R Interval 234 ms    QRS Duration 158 ms    Q-T Interval 504 ms    QTC Calculation (Bezet) 472 ms    Calculated P Axis 66 degrees    Calculated R Axis -25 degrees    Calculated T Axis 10 degrees    Diagnosis Sinus bradycardia with 1st degree AV block  Right bundle branch block  Moderate voltage criteria for LVH, may be normal variant  Abnormal ECG  No previous ECGs available         Radiologic Studies -   CT HEAD WO CONT   Final Result   Impression:       No CT evidence of acute intracranial pathology. Moderate burden of presumed chronic white matter microvascular ischemic changes. Cerebral volume loss. I have telephoned a wet reading directly to   Anson Alfred at 10:37 AM on   9/15/2019. CT Results  (Last 48 hours)               09/15/19 1027  CT HEAD WO CONT Final result    Impression:  Impression:        No CT evidence of acute intracranial pathology. Moderate burden of presumed chronic white matter microvascular ischemic changes. Cerebral volume loss. I have telephoned a wet reading directly to   Anson Alfred at 10:37 AM on   9/15/2019. Narrative:  NONCONTRAST HEAD CT       CPT CODE: 10684       INDICATION: Above. Facial muscle weakness/paralysis, left-sided. CODE S stroke   protocol head CT.       COMPARISON: No prior head CT in PACS. Reference MRI 4/16/2013. TECHNIQUE: Serial axial CT images through the brain were obtained without   administration of intravenous contrast. Additional coronal and sagittal   reformation images were also performed. All CT scans at this facility are performed using dose optimization technique as   appropriate to the performed exam, to include automated exposure control,   adjustment of the mA and/or kV according to patient's size (Including   appropriate matching for site-specific examinations), or use of iterative   reconstruction technique. FINDINGS:       There is mild to moderate diffuse prominence of the cortical sulci best seen in   the perisylvian regions compatible with cerebral volume loss, not grossly   remarkable for the patient's age. There is no evidence of acute intracranial hemorrhage.        There is moderate burden of diffuse patchy decreased attenuation in the deep   periventricular and subcortical white matter bilaterally compatible with   nonspecific white matter disease, likely chronic microvascular ischemic changes. No midline shift or other mass effect is seen. No mass lesion identified. No evidence of acute ischemic stroke/ cerebrovascular accident (CVA) is   identified. Head CT is often insensitive early to acute ischemic stroke. Left ethmoid air cell opacification noted. The visualized portions of the   paranasal sinuses otherwise demonstrate no significant mucosal pathology. The   mastoid air cells are aerated  The calvarium appears intact. CXR Results  (Last 48 hours)    None            Medical Decision Making   I am the first provider for this patient. I reviewed the vital signs, available nursing notes, past medical history, past surgical history, family history and social history. Vital Signs-Reviewed the patient's vital signs. Records Reviewed: Nursing Notes and Old Medical Records     Procedures: None   Procedures    Provider Notes (Medical Decision Making):     Differential: TIA, CVA      Plan: Code S was called immediately upon arrival at 1017. All symptoms began at the nursing home at 945. Neurology, Dr. Estrada All agreed neuro exam was within normal limits and agreed with concern for TIA. Advised admission for further work-up. Patient agrees with admission. Will order dose of aspirin. Discussed case with Dr. Natalie Edge who agrees with admission. Core Measures:    Critical Care Time:   Critical Care Time:   I have spent 40 minutes of critical care time involved in lab review, consultations with specialist, family decision-making, and documentation. During this entire length of time I was immediately available to the patient.     Critical Care:   The reason for providing this level of medical care for this critically ill patient was due a critical illness that impaired one or more vital organ systems such that there was a high probability of imminent or life threatening deterioration in the patients condition. This care involved high complexity decision making to assess, manipulate, and support vital system functions, to treat this degreee vital organ system failure and to prevent further life threatening deterioration of the patients condition. For Hospitalized Patients:    1. Hospitalization Decision Time:  The decision to hospitalize the patient was made by Dr. Lala Borges at 10:45 on 9/15/2019    2. Aspirin: Aspirin was given      11:34 AM  Patient family arrived and states they will not allow him to be admitted to [facility]. Family states they have discussed at length with nursing home they are to send patient to only [facility] facilities, however the patient still arrived here today. 12:06 PM  Discussed case with [facility] who states there are no beds currently available and bed status is discharge dependant, have discussed this with the patients sons who state they would prefer the first bed available and agree to go to [facility] RECONCILIATION:  No current facility-administered medications for this encounter. Current Outpatient Medications   Medication Sig    hydrALAZINE (APRESOLINE) 25 mg tablet Take 25 mg by mouth four (4) times daily.  acetaminophen (TYLENOL) 325 mg tablet Take 325 mg by mouth every four (4) hours as needed for Pain.  cloNIDine (CATAPRES) 0.3 mg/24 hr 1 Patch by TransDERmal route every seven (7) days.  simvastatin (ZOCOR) 40 mg tablet Take 40 mg by mouth nightly.  lactulose (CHRONULAC) 10 gram/15 mL solution Take 45 mL by mouth three (3) times daily as needed (constipation).     montelukast (SINGULAIR) 10 mg tablet TAKE ONE TABLET BY MOUTH EVERY NIGHT AT BEDTIME    rivastigmine tartrate (EXELON) 1.5 mg capsule TAKE ONE CAPSULE BY MOUTH TWICE DAILY WITH MEALS    SITagliptin (JANUVIA) 100 mg tablet TAKE 1 TABLET BY MOUTH ONCE DAILY    FERROUS SULFATE (FEOSOL PO) Take 325 mg by mouth two (2) times a day. for anemia      valsartan-hydrochlorothiazide (DIOVAN HCT) 160-12.5 mg per tablet Take 1 Tab by mouth daily.  alfuzosin SR (UROXATRAL) 10 mg SR tablet Take 10 mg by mouth nightly as needed for Other (prostate).  fexofenadine (ALLEGRA) 60 mg tablet Take 60 mg by mouth two (2) times a day. Disposition:  Admission        Diagnosis     Clinical Impression:   1.  TIA (transient ischemic attack)

## 2019-09-15 NOTE — ED TRIAGE NOTES
Pt arrived via ems with a cc of left sided weakness, and facial droop. The EMS staff stated that he was slumped over with a completely flaccid left side. HIis bg was 270 upon per EMS. Pt has no weakness or slurred speech upon arrival..

## 2019-09-15 NOTE — ED NOTES
After discussions with [facility] by [facility] (no bed availability there), patient and family made aware. Patient and family now agree to admission to [facility]. Nursing supervisor, [facility], made aware of agreed admission now to [facility]. Awaiting bed assignment.

## 2019-09-15 NOTE — ROUTINE PROCESS
TRANSFER - OUT REPORT:    Verbal report given to Jerod Giles RN on Cleve Speaks  being transferred to [id number] [place] [address] (unit) for routine progression of care       Report consisted of patients Situation, Background, Assessment and   Recommendations(SBAR). Information from the following report(s) ED Summary and MAR was reviewed with the receiving nurse. Lines:   Peripheral IV 09/15/19 Right Hand (Active)   Site Assessment Clean, dry, & intact 9/15/2019 10:30 AM   Dressing Status Clean, dry, & intact 9/15/2019 10:30 AM   Hub Color/Line Status Flushed 9/15/2019 10:30 AM        Opportunity for questions and clarification was provided.       Patient transported with:   Monitor

## 2019-09-16 ENCOUNTER — APPOINTMENT (OUTPATIENT)
Dept: GENERAL RADIOLOGY | Age: 84
DRG: 069 | End: 2019-09-16
Attending: HOSPITALIST
Payer: MEDICARE

## 2019-09-16 ENCOUNTER — APPOINTMENT (OUTPATIENT)
Dept: MRI IMAGING | Age: 84
DRG: 069 | End: 2019-09-16
Attending: INTERNAL MEDICINE
Payer: MEDICARE

## 2019-09-16 PROBLEM — I63.9 ACUTE EMBOLIC STROKE (HCC): Status: ACTIVE | Noted: 2019-03-08

## 2019-09-16 PROBLEM — K85.90 ACUTE PANCREATITIS: Status: ACTIVE | Noted: 2019-09-02

## 2019-09-16 PROBLEM — N17.9 ACUTE RENAL FAILURE (HCC): Status: ACTIVE | Noted: 2019-09-16

## 2019-09-16 PROBLEM — I49.3 PVC (PREMATURE VENTRICULAR CONTRACTION): Status: ACTIVE | Noted: 2019-02-23

## 2019-09-16 LAB
CHOLEST SERPL-MCNC: 92 MG/DL
EST. AVERAGE GLUCOSE BLD GHB EST-MCNC: 126 MG/DL
GLUCOSE BLD STRIP.AUTO-MCNC: 138 MG/DL (ref 70–110)
GLUCOSE BLD STRIP.AUTO-MCNC: 149 MG/DL (ref 70–110)
GLUCOSE BLD STRIP.AUTO-MCNC: 164 MG/DL (ref 70–110)
GLUCOSE BLD STRIP.AUTO-MCNC: 173 MG/DL (ref 70–110)
HBA1C MFR BLD: 6 % (ref 4.2–5.6)
HDLC SERPL-MCNC: 37 MG/DL (ref 40–60)
HDLC SERPL: 2.5 {RATIO} (ref 0–5)
LDLC SERPL CALC-MCNC: 39.8 MG/DL (ref 0–100)
LIPID PROFILE,FLP: ABNORMAL
TRIGL SERPL-MCNC: 76 MG/DL (ref ?–150)
VLDLC SERPL CALC-MCNC: 15.2 MG/DL

## 2019-09-16 PROCEDURE — 80061 LIPID PANEL: CPT

## 2019-09-16 PROCEDURE — 74011000250 HC RX REV CODE- 250: Performed by: HOSPITALIST

## 2019-09-16 PROCEDURE — 74011250636 HC RX REV CODE- 250/636: Performed by: HOSPITALIST

## 2019-09-16 PROCEDURE — 74011000255 HC RX REV CODE- 255: Performed by: FAMILY MEDICINE

## 2019-09-16 PROCEDURE — 82962 GLUCOSE BLOOD TEST: CPT

## 2019-09-16 PROCEDURE — 92610 EVALUATE SWALLOWING FUNCTION: CPT

## 2019-09-16 PROCEDURE — 65660000000 HC RM CCU STEPDOWN

## 2019-09-16 PROCEDURE — 92611 MOTION FLUOROSCOPY/SWALLOW: CPT

## 2019-09-16 PROCEDURE — 74011636637 HC RX REV CODE- 636/637: Performed by: INTERNAL MEDICINE

## 2019-09-16 PROCEDURE — 36415 COLL VENOUS BLD VENIPUNCTURE: CPT

## 2019-09-16 PROCEDURE — 74011250637 HC RX REV CODE- 250/637: Performed by: FAMILY MEDICINE

## 2019-09-16 PROCEDURE — 83036 HEMOGLOBIN GLYCOSYLATED A1C: CPT

## 2019-09-16 PROCEDURE — 94762 N-INVAS EAR/PLS OXIMTRY CONT: CPT

## 2019-09-16 PROCEDURE — 97535 SELF CARE MNGMENT TRAINING: CPT

## 2019-09-16 PROCEDURE — 74230 X-RAY XM SWLNG FUNCJ C+: CPT

## 2019-09-16 PROCEDURE — 97165 OT EVAL LOW COMPLEX 30 MIN: CPT

## 2019-09-16 PROCEDURE — 92526 ORAL FUNCTION THERAPY: CPT

## 2019-09-16 PROCEDURE — 97116 GAIT TRAINING THERAPY: CPT

## 2019-09-16 PROCEDURE — 74011250637 HC RX REV CODE- 250/637: Performed by: HOSPITALIST

## 2019-09-16 PROCEDURE — 70551 MRI BRAIN STEM W/O DYE: CPT

## 2019-09-16 PROCEDURE — 77030010545

## 2019-09-16 PROCEDURE — 97162 PT EVAL MOD COMPLEX 30 MIN: CPT

## 2019-09-16 RX ORDER — LABETALOL 100 MG/1
100 TABLET, FILM COATED ORAL EVERY 8 HOURS
Status: ON HOLD | COMMUNITY
Start: 2019-09-05 | End: 2019-09-17 | Stop reason: SDUPTHER

## 2019-09-16 RX ORDER — MAGNESIUM SULFATE 100 %
4 CRYSTALS MISCELLANEOUS AS NEEDED
Status: DISCONTINUED | OUTPATIENT
Start: 2019-09-16 | End: 2019-09-18 | Stop reason: HOSPADM

## 2019-09-16 RX ORDER — BENZONATATE 100 MG/1
1 CAPSULE ORAL
Refills: 0 | Status: ON HOLD | COMMUNITY
Start: 2019-09-07 | End: 2019-09-17 | Stop reason: SDUPTHER

## 2019-09-16 RX ORDER — HYDRALAZINE HYDROCHLORIDE 20 MG/ML
10 INJECTION INTRAMUSCULAR; INTRAVENOUS
Status: CANCELLED | OUTPATIENT
Start: 2019-09-16

## 2019-09-16 RX ORDER — POLYETHYLENE GLYCOL 3350 17 G/17G
17 POWDER, FOR SOLUTION ORAL DAILY
Refills: 0 | Status: ON HOLD | COMMUNITY
Start: 2019-09-07 | End: 2019-09-17 | Stop reason: SDUPTHER

## 2019-09-16 RX ORDER — NIFEDIPINE 30 MG/1
30 TABLET, EXTENDED RELEASE ORAL DAILY
Status: ON HOLD | COMMUNITY
Start: 2019-09-06 | End: 2019-09-17 | Stop reason: SDUPTHER

## 2019-09-16 RX ORDER — INSULIN LISPRO 100 [IU]/ML
INJECTION, SOLUTION INTRAVENOUS; SUBCUTANEOUS
Status: DISCONTINUED | OUTPATIENT
Start: 2019-09-16 | End: 2019-09-18 | Stop reason: HOSPADM

## 2019-09-16 RX ORDER — CLONIDINE 0.3 MG/24H
1 PATCH, EXTENDED RELEASE TRANSDERMAL
Status: DISCONTINUED | OUTPATIENT
Start: 2019-09-16 | End: 2019-09-18 | Stop reason: HOSPADM

## 2019-09-16 RX ORDER — DEXTROSE MONOHYDRATE 100 MG/ML
125-250 INJECTION, SOLUTION INTRAVENOUS AS NEEDED
Status: DISCONTINUED | OUTPATIENT
Start: 2019-09-16 | End: 2019-09-18 | Stop reason: HOSPADM

## 2019-09-16 RX ORDER — INSULIN LISPRO 100 [IU]/ML
INJECTION, SOLUTION INTRAVENOUS; SUBCUTANEOUS EVERY 6 HOURS
Status: DISCONTINUED | OUTPATIENT
Start: 2019-09-16 | End: 2019-09-16

## 2019-09-16 RX ADMIN — FAMOTIDINE 20 MG: 10 INJECTION INTRAVENOUS at 21:39

## 2019-09-16 RX ADMIN — INSULIN LISPRO 2 UNITS: 100 INJECTION, SOLUTION INTRAVENOUS; SUBCUTANEOUS at 06:29

## 2019-09-16 RX ADMIN — Medication 10 ML: at 17:11

## 2019-09-16 RX ADMIN — ATORVASTATIN CALCIUM 80 MG: 40 TABLET, FILM COATED ORAL at 21:38

## 2019-09-16 RX ADMIN — Medication 10 ML: at 06:09

## 2019-09-16 RX ADMIN — BARIUM SULFATE 45 ML: 400 SUSPENSION ORAL at 12:00

## 2019-09-16 RX ADMIN — HEPARIN SODIUM 5000 UNITS: 5000 INJECTION INTRAVENOUS; SUBCUTANEOUS at 17:09

## 2019-09-16 RX ADMIN — ASPIRIN 81 MG 81 MG: 81 TABLET ORAL at 08:39

## 2019-09-16 RX ADMIN — BARIUM SULFATE 45 ML: 400 PASTE ORAL at 12:00

## 2019-09-16 RX ADMIN — Medication 10 ML: at 21:37

## 2019-09-16 RX ADMIN — LABETALOL 20 MG/4 ML (5 MG/ML) INTRAVENOUS SYRINGE 5 MG: at 12:41

## 2019-09-16 RX ADMIN — BARIUM SULFATE 700 MG: 700 TABLET ORAL at 12:00

## 2019-09-16 RX ADMIN — INSULIN LISPRO 2 UNITS: 100 INJECTION, SOLUTION INTRAVENOUS; SUBCUTANEOUS at 12:42

## 2019-09-16 RX ADMIN — BARIUM SULFATE 30 ML: 400 SUSPENSION ORAL at 12:00

## 2019-09-16 RX ADMIN — HEPARIN SODIUM 5000 UNITS: 5000 INJECTION INTRAVENOUS; SUBCUTANEOUS at 06:09

## 2019-09-16 RX ADMIN — HEPARIN SODIUM 5000 UNITS: 5000 INJECTION INTRAVENOUS; SUBCUTANEOUS at 21:38

## 2019-09-16 NOTE — ROUTINE PROCESS
4563 Pt received from [facility] ER. NAD. Oriented x 4. Soiled with urine. Bilateral hearing aids with no batteris at bedside with a silver color wrist watch. Brief on. Nahomi care performed. Sweat suit top and bottom placed in closet. Hospital gown on.    1830 Family at bedside. Pt did not pass bedside swallow evaluation with water and straw and water by cup due to coughing. Tolerated apple sauce with no difficulty. Bedside shift change report given to SOHA Soni RN (oncoming nurse) by Omid Morelos RN  (offgoing nurse).  Report included the following information SBAR, MAR, KARDEX AND RECENT RESULTS

## 2019-09-16 NOTE — PROGRESS NOTES
Pt generated mews score of 3 related to bp of 204/84 at approx 1617 . Pt given clonidine patch at 1709

## 2019-09-16 NOTE — PROGRESS NOTES
ARU/IPR REFERRAL CONTACT NOTE  [id number] [place] [address] Physical Rehabilitation    RE: Ellis Srinivasan    Referral received to review this patient's case for admission to [address] [place] [address] Physical Rehabilitation. Current status reviewed.  When appropriate, will need PT/OT evaluation/treatment on this patient to complete the pre-admission evaluation.  Will continue to follow. Thank you for this referral.  Should you have any questions please do not hesitate to call. Sincerely,  Kenia Connor  [address] [place] [address] Physical Rehabilitation  [phone]

## 2019-09-16 NOTE — PROGRESS NOTES
MBS completed with recs of [facility] soft and honey-thick liquids, meds as tolerated. Full report to follow.      Thank you for this referral.    Sabino Brannon M.S. CCC-SLP/L  Speech-Language Pathologist

## 2019-09-16 NOTE — PROGRESS NOTES
Problem: Dysphagia (Adult)  Goal: *Acute Goals and Plan of Care (Insert Text)  Description  Patient will:  1. Tolerate PO trials with 0 s/s overt distress in 4/5 trials  2. Utilize compensatory swallow strategies/maneuvers (decrease bite/sip, size/rate, alt. liq/sol) with min cues in 4/5 trials  3. Perform oral-motor/laryngeal exercises to increase oropharyngeal swallow function with min cues  4. Complete an objective swallow study (i.e., MBSS) to assess swallow integrity, r/o aspiration, and determine of safest LRD, min A - met 9/16/19    Rec:     Verdene Griffins soft diet with honey-thick liquids  Aspiration precautions  HOB >45 during po intake, remain >30 for 30-45 minutes after po   Small bites/sips; alternate liquid/solid with slow feeding rate   Oral care TID  Meds in puree or with HTL wash         9/16/2019 0807 by Lexy BERKOWITZ  Outcome: Progressing Towards Goal    SPEECH PATHOLOGY MODIFIED BARIUM SWALLOW STUDY & TREATMENT    Patient: Leatha Fabian (7 y.o. male)  Date: 9/16/2019  Primary Diagnosis: TIA (transient ischemic attack) [G45.9]        Precautions:   Aspiration, Fall    ASSESSMENT :  Based on the objective data described below, the patient presents with mild oral and mod pharyngeal dysphagia c/b silent penetration to laryngeal vestibule with nectar-thick liquids. Pt tolerated reg solid, puree, honey-thick, and 13 mm Ba pill with honey-thick wash without aspiration/penetration events. Increased mastication of solids observed with positive oral clearance. Deficits include decreased laryngeal elevation and impaired pharyngeal motility/sensation. Pt safe for [facility] soft diet with honey-thick liquids, aspiration precautions, oral care TID, and meds as tolerated. ST will continue to follow to ensure safety of PO.     TREATMENT :  Treatment provided post diagnostic testing including oropharyngeal anatomy/physiology, MBS results, diet recommendations and compensatory strategies/positioning.   Pt able to verbalize understanding. Will continue to follow. Patient will benefit from skilled intervention to address the above impairments. Patient's rehabilitation potential is considered to be Fair  Factors which may influence rehabilitation potential include:   ?              Mental ability/status  ? Medical condition     PLAN :  Recommendations and Planned Interventions: See above  Frequency/Duration: Patient will be followed by speech-language pathology 1-2 times per day/3-5 days per week to address goals. Discharge Recommendations: [place] and To Be Determined     SUBJECTIVE:   Patient stated Thats good. OBJECTIVE:     Past Medical History:   Diagnosis Date    Arthritis     Cancer ([facility] [address]     Coronary atherosclerosis of native coronary artery     AS DESCRIBED IN CATH CARDIAC CATH IN 9/07 SHOWS 80% DISTAL LAD AND 40% PROXIMAL LAD DISEASE . HE IS ON MEDICAL TREATMENT.  ASA AND B-BLOCKER 3/10 RENAL ARTERY DUPLEX : NL RT RENAL ARTERY WITH < 60% STENOSIS OF LEFT RENAL ARTERY    Diabetes ([address]     Glaucoma     Hypertension     Impotence of organic origin     Malignant neoplasm of prostate ([address]     Other and unspecified hyperlipidemia      Past Surgical History:   Procedure Laterality Date    COLONOSCOPY N/A 3/16/2019    COLONOSCOPY w/ polypectomies performed by Arely Coulter MD at [facility]  3/16/2019         [facility]  3/16/2019          Prior Level of Function/Home Situation: see below  [address]: Assisted living  One/Two Story Residence: One story  Living Alone: No  Support Systems: Assisted living  Patient Expects to be Discharged to[de-identified] Assisted living  Current DME Used/Available at Home: Marisela Reas, rollator  Diet prior to admission: puree with pudding-thick per previous SLP recs  Current Diet:  mech soft with honey-thick   Radiologist:    Film Views: Lateral;Fluoro  Patient Position: 90 in fluoro chair    Trial 1: Trial 2:   Consistency Presented: Nectar thick liquid Consistency Presented: Honey thick liquid;Puree; Solid;Pill/Tablet   How Presented: Self-fed/presented;Cup/sip How Presented: Self-fed/presented;Cup/sip;Spoon         Bolus Acceptance: No impairment Bolus Acceptance: No impairment   Bolus Formation/Control: No impairment:   Bolus Formation/Control: Impaired: Mastication   Propulsion: No impairment Propulsion: No impairment   Oral Residue: None Oral Residue: None   Initiation of Swallow: No impairment     Timing: No impairment Timing: No impairment   Penetration: During swallow; To laryngeal vestibule Penetration: None   Aspiration/Timing: No evidence of aspiration Aspiration/Timing: No evidence of aspiration   Pharyngeal Clearance: Vallecular residue; Less than 10% Pharyngeal Clearance: No residue   Attempted Modifications: Small sips and bites Attempted Modifications: Small sips and bites; Alternate liquids/solids   Effective Modifications: None Effective Modifications: Alternate liquids/solids(small bites/sips)   Cues for Modifications: Minimal-moderate Cues for Modifications: Minimal-Mod         Decreased Tongue Base Retraction?: No  Laryngeal Elevation: Reduced excursion with laryngeal vestibule gap  Aspiration/Penetration Score: 3 (Penetration/Visible residue-Contrast remains above the folds/cords, but is not cleared)  Pharyngeal Symmetry: Not assessed  Pharyngeal-Esophageal Segment: No impairment  Pharyngeal Dysfunction: Decreased strength;Decreased elevation/closure    Oral Phase Severity: Mild  Pharyngeal Phase Severity: Moderate    8-point Penetration-Aspiration Scale: Score 3    PAIN:  Pt reports 0/10 pain or discomfort prior to MBS. Pt reports 0/10 pain or discomfort post MBS. COMMUNICATION/EDUCATION:   ?  Patient educated regarding MBS results and diet recommendations. ?  Patient/family have participated as able in goal setting and plan of care.     Thank you for this referral.    Jairo Evans CLINTON Raymundo. CCC-SLP/L  Speech-Language Pathologist

## 2019-09-16 NOTE — PROGRESS NOTES
OCCUPATIONAL THERAPY EVALUATION/DISCHARGE    Patient: Sanna Newell (53 y.o. male)  Date: 9/16/2019  Primary Diagnosis: TIA (transient ischemic attack) [G45.9]        Precautions:   Fall  PLOF: Pt was modified independent with basic self care tasks and used a RW for functional mobility PTA. He lives in an snf. ASSESSMENT AND RECOMMENDATIONS:  Based on the objective data described below, the patient presents is able to perform basic self care tasks without assistance while seated. Contact guard assist given for functional standing and transfers. Will defer to PT for mobility training. No deficits noted in UE strength or sensation. Patient lives in an assisted living facility and has all needed DME for home safety. Skilled occupational therapy is not indicated at this time. Discharge Recommendations: Home Health safety eval  Further Equipment Recommendations for Discharge: N/A      SUBJECTIVE:   Patient stated I wear adult diapers usually.     OBJECTIVE DATA SUMMARY:     Past Medical History:   Diagnosis Date    Arthritis     Cancer ([facility] [address]     Coronary atherosclerosis of native coronary artery     AS DESCRIBED IN CATH CARDIAC CATH IN 9/07 SHOWS 80% DISTAL LAD AND 40% PROXIMAL LAD DISEASE . HE IS ON MEDICAL TREATMENT.  ASA AND B-BLOCKER 3/10 RENAL ARTERY DUPLEX : NL RT RENAL ARTERY WITH < 60% STENOSIS OF LEFT RENAL ARTERY    Diabetes ([address]     Glaucoma     Hypertension     Impotence of organic origin     Malignant neoplasm of prostate ([address]     Other and unspecified hyperlipidemia      Past Surgical History:   Procedure Laterality Date    COLONOSCOPY N/A 3/16/2019    COLONOSCOPY w/ polypectomies performed by Milly Marques MD at [address] [place] [address]  3/16/2019         KARISHMA,MYRON Vazquez  3/16/2019          Barriers to Learning/Limitations: None  Compensate with: visual, verbal, tactile, kinesthetic cues/model    Home Situation:   Home Situation  Home Environment: Assisted living  One/Two Story Residence: One story  Living Alone: No  Support Systems: Assisted living  Patient Expects to be Discharged to[de-identified] Assisted living  Current DME Used/Available at Home: Tess Sinning, rollator  Tub or Shower Type: Shower(with seat/grab bar)  ? Right hand dominant   ? Left hand dominant    Cognitive/Behavioral Status:  Neurologic State: Alert  Orientation Level: Oriented X4  Cognition: Follows commands  Safety/Judgement: Awareness of environment; Fall prevention    Skin: Intact on UEs  Edema: None noted in UEs    Vision/Perceptual:     Acuity: Able to read clock/calendar on wall without difficulty      Coordination: BUE  Coordination: Within functional limits  Fine Motor Skills-Upper: Left Intact; Right Intact    Gross Motor Skills-Upper: Left Intact; Right Intact    Balance:  Sitting: Intact  Standing: With support    Strength: BUE  Strength: Within functional limits    Tone & Sensation: BUE  Tone: Normal  Sensation: Intact    Range of Motion: BUE  AROM: Within functional limits    Functional Mobility and Transfers for ADLs:  Bed Mobility:  Supine to Sit: Supervision  Sit to Supine: Contact guard assistance    Transfers:  Sit to Stand: Contact guard assistance  Stand to Sit: Contact guard assistance   Toilet Transfer : Contact guard assistance    ADL Assessment:  Feeding: Modified independent    Oral Facial Hygiene/Grooming: Modified Independent    Bathing: Contact guard assistance    Upper Body Dressing: Setup    Lower Body Dressing: Contact guard assistance    Toileting: Setup;Supervision(with urinal)    ADL Intervention:  Patient with bladder accident in bed. He was able to bathe his perineal area using bath wipes after setup while seated on EOB and in standing. CGA given for standing balance. Patient continued to urinate but was able to keep the urinal in place until finished. Clean hospital gown applied with setup. Cognitive Retraining  Safety/Judgement: Awareness of environment; Fall prevention    Pain:  Pain level pre-treatment: 0/10   Pain level post-treatment: 0/10   Pain Intervention(s): NA  Response to intervention: NA    Activity Tolerance:   Good  Please refer to the flowsheet for vital signs taken during this treatment. After treatment:   ?  Patient left in no apparent distress sitting up in chair  ? Patient left in no apparent distress in bed  ? Call bell left within reach  ? Nursing notified  ? Caregiver present  ? Bed alarm activated    COMMUNICATION/EDUCATION:   ?      Role of Occupational Therapy in the acute care setting  ? Home safety education was provided and the patient/caregiver indicated understanding. ? Patient/family have participated as able and agree with findings and recommendations. ?      Patient is unable to participate in plan of care at this time. Thank you for this referral.  Franco Velasquez MS OTR/L   Time Calculation: 26 mins      Eval Complexity: History: LOW Complexity : Brief history review ; Examination: LOW Complexity : 1-3 performance deficits relating to physical, cognitive , or psychosocial skils that result in activity limitations and / or participation restrictions ;    Decision Making:LOW Complexity : No comorbidities that affect functional and no verbal or physical assistance needed to complete eval tasks

## 2019-09-16 NOTE — PROGRESS NOTES
MRI Screening form needs to be filled out and faxed to [phone] MRI can be scheduled. If unable to obtain information from pt, [facility] needs to be contacted.  If pt is claustro or will need pain meds, please have ordered in advance in order to facilitate exam.

## 2019-09-16 NOTE — PROGRESS NOTES
Pt generated mews score of 3 related to bp of 222/89. Dr. Emma Rogers made aware and neuro stated we can start to treat elevated bp slowly for pt dx of stroke. Pt given labetalol 5mg prn at 1241 after pt returned to floor from testing. Will reassess bp shortly.

## 2019-09-16 NOTE — H&P
History & Physical    Patient: Uche Aguilar MRN: [id number]  CSN: [id number]    YOB: 1929  Age: 80 y.o. Sex: male      DOA: 9/15/2019       HPI:     Uche Aguilar is a very pleasant, alert  80 y.o. male who was discharged from [facility] 9/5/2019 for treatment of acute pancreatitis. He was treated at [place] March 2019 for lower GI bleed and had dysphagia at that time and was discharged on a pureed diet with pudding thickened liquids. 3/1/2019 he was treated at [facility] for embolic CVA and acute renal failure. \"Echo w/bubble study - EF 65%, negative bubble study  Carotid doppler - 50-69% stenosis in DEREK, LICA-<50%  MRA head - Stenosis is most severe involving the left DERIK and bilateral PCA\". He has additional history of CAD, Diabetes and hypertension. Mr Milind Mata demonstrated left sided weakness while walking to the dining room at [facility] on 9/15/2019. EMS was called and reported left facial droop and flaccid left arm resolved en route; he presented to [facility] ER. In the ER all symptoms were resolved on arrival. CT head with no acute pathology. /44, HR 55, RR 14, pulse ox 97% Creatinine 1.83, BUN 21, GFR 42, Hgb 10.8, Hct 34.6 troponin 0.02. EKG sinus lilia 53 with 1st degree AV block . 234. Tele Neurology was consulted and recommended admission for further workup. Patient's son wanted patient to [place] [address] for continuity of care, having just been discharged from there. Yadiraara relayed back there were no beds and the family opted not to defer for bed and accepted admission to [address] [place] [address] Unit. Past Medical History:   Diagnosis Date    Arthritis     Cancer [facility] [address]     Coronary atherosclerosis of native coronary artery     AS DESCRIBED IN CATH CARDIAC CATH IN 9/07 SHOWS 80% DISTAL LAD AND 40% PROXIMAL LAD DISEASE . HE IS ON MEDICAL TREATMENT.  ASA AND B-BLOCKER 3/10 RENAL ARTERY DUPLEX : NL RT RENAL ARTERY WITH < 60% STENOSIS OF LEFT RENAL ARTERY    Diabetes ([facility] [address]     Glaucoma     Hypertension     Impotence of organic origin     Malignant neoplasm of prostate ([facility] [address]     Other and unspecified hyperlipidemia        Past Surgical History:   Procedure Laterality Date    COLONOSCOPY N/A 3/16/2019    COLONOSCOPY w/ polypectomies performed by Doris García MD at [address]  3/16/2019         COLONOSCOPY,MYRON Moreno Ruben  3/16/2019            Family History   Problem Relation Age of Onset    Diabetes Other     Heart Disease Other        Social History     Socioeconomic History    Marital status:      Spouse name: Not on file    Number of children: Not on file    Years of education: Not on file    Highest education level: Not on file   Tobacco Use    Smoking status: Never Smoker    Smokeless tobacco: Never Used   Substance and Sexual Activity    Alcohol use: No       Prior to Admission medications    Medication Sig Start Date End Date Taking? Authorizing Provider   hydrALAZINE (APRESOLINE) 25 mg tablet Take 25 mg by mouth four (4) times daily. Provider, Historical   acetaminophen (TYLENOL) 325 mg tablet Take 325 mg by mouth every four (4) hours as needed for Pain. Provider, Historical   cloNIDine (CATAPRES) 0.3 mg/24 hr 1 Patch by TransDERmal route every seven (7) days. Provider, Historical   simvastatin (ZOCOR) 40 mg tablet Take 40 mg by mouth nightly. Provider, Historical   lactulose (CHRONULAC) 10 gram/15 mL solution Take 45 mL by mouth three (3) times daily as needed (constipation).  3/21/19   Tristen Louis MD   montelukast (SINGULAIR) 10 mg tablet TAKE ONE TABLET BY MOUTH EVERY NIGHT AT BEDTIME 7/19/17   Provider, Historical   rivastigmine tartrate (EXELON) 1.5 mg capsule TAKE ONE CAPSULE BY MOUTH TWICE DAILY WITH MEALS 12/3/17   Provider, Historical   SITagliptin (JANUVIA) 100 mg tablet TAKE 1 TABLET BY MOUTH ONCE DAILY 10/23/17   Provider, Historical   FERROUS SULFATE (Sherrell Rush PO) Take 325 mg by mouth two (2) times a day. for anemia      Provider, Historical   valsartan-hydrochlorothiazide (DIOVAN HCT) 160-12.5 mg per tablet Take 1 Tab by mouth daily. Provider, Historical   alfuzosin SR (UROXATRAL) 10 mg SR tablet Take 10 mg by mouth nightly as needed for Other (prostate). Provider, Historical   fexofenadine (ALLEGRA) 60 mg tablet Take 60 mg by mouth two (2) times a day. Provider, Historical       No Known Allergies    Review of Systems:  A 12- point review of systems was performed and unremarkable except as noted in HPI  Additionally the patient stated he needed batteries for hearing aid and is very hard of hearing. Physical Exam:      Visit Vitals  /78 (BP 1 Location: Left arm, BP Patient Position: At rest)   Pulse (!) 54   Temp 97.5 °F (36.4 °C)   Resp 18   Ht 5' 10\" (1.778 m)   Wt 84.9 kg (187 lb 1.6 oz)   SpO2 95%   BMI 26.85 kg/m²       Physical Exam:  GENERAL: alert, cooperative, no distress, appears younger than stated age  [de-identified] Head atraumatic, [place], speech clear and goal directed, RICH, conjunctiva clear, Anicteric, facial symmetry, pharynx clear, neck supple  Chest HR 54 reg, lungs clear  Abdomen soft, non tender, BS+  Extremities moving well in bed, BUE 5/5, BLE 5/5, no edema    Lab/Data Review:  Labs: Results:       Chemistry Recent Labs     09/15/19  1030   *      K 4.1      CO2 25   BUN 21*   CREA 1.83*   CA 9.2   AGAP 9   BUCR 11*      CBC w/Diff Recent Labs     09/15/19  1030   WBC 8.1   RBC 3.69*   HGB 10.8*   HCT 34.6*      GRANS 75*   LYMPH 15*   EOS 3      Coagulation No results for input(s): PTP, INR, APTT in the last 72 hours. No lab exists for component: INREXT    Iron/Ferritin No results for input(s): IRON in the last 72 hours. No lab exists for component: TIBCCALC   BNP No results for input(s): BNPP in the last 72 hours.    Cardiac Enzymes Recent Labs     09/15/19  1030   CPK 75   CKND1 1.7      Liver Enzymes No results for input(s): TP, ALB, TBIL, AP, SGOT, GPT in the last 72 hours. No lab exists for component: DBIL   Thyroid Studies No results found for: T4, T3U, TSH, TSHEXT       All Micro Results     None          Imaging Reviewed:  FINDINGS:     There is mild to moderate diffuse prominence of the cortical sulci best seen in  the perisylvian regions compatible with cerebral volume loss, not grossly  remarkable for the patient's age. There is no evidence of acute intracranial hemorrhage.     There is moderate burden of diffuse patchy decreased attenuation in the deep  periventricular and subcortical white matter bilaterally compatible with  nonspecific white matter disease, likely chronic microvascular ischemic changes.     No midline shift or other mass effect is seen. No mass lesion identified.       No evidence of acute ischemic stroke/ cerebrovascular accident (CVA) is  identified. Head CT is often insensitive early to acute ischemic stroke.      Left ethmoid air cell opacification noted. The visualized portions of the  paranasal sinuses otherwise demonstrate no significant mucosal pathology. The  mastoid air cells are aerated  The calvarium appears intact.     IMPRESSION  Impression:      No CT evidence of acute intracranial pathology.     Moderate burden of presumed chronic white matter microvascular ischemic changes. Cerebral volume loss. Assessment:   Principal Problem:    TIA (transient ischemic attack) (9/15/2019)    Active Problems:    Coronary atherosclerosis of native coronary artery ()      Overview: AS DESCRIBED IN CATH CARDIAC CATH IN 9/07 SHOWS 80% DISTAL LAD AND 40%       PROXIMAL LAD DISEASE . HE IS ON MEDICAL TREATMENT.  ASA AND B-BLOCKER 3/10       RENAL ARTERY DUPLEX : NL RT RENAL ARTERY WITH < 60% STENOSIS OF LEFT RENAL       ARTERY      Type 2 diabetes mellitus with hyperglycemia, without long-term current use of insulin ([address] (2/3/2009)      Essential hypertension (2/3/2009)      Plan:   Admit to [facility]  Inpatient neurology consulted  Patient was hypotensive on arrival 104/44 and bradycardic  Possibly a low flow event precipitated symptoms that are now resolved after the patient was placed supine  He has been on hydralazine, Catapres and his ARB was recently replaced with Labetolol and Nifedipine  Will hold off on hypertensive medications and allow liberal BP, labetalol, prn   and close follow of response to BP meds,   Orthostatic VS q shift x 4    Patient did not pass swallow y RN for thin liquids and required past modifications, He states\"yes, I remember being on thickened liquids for a while. \" NPO pending speech desireeal in am    Discussed Advanced Directives and patient wants to be a Full Code   with all measures     Full Code    Called patient's son, Verner Lasso, [phone],  to update patient status and he expressed frustration that patient was admitted to [facility] stating that he is followed by Zina Cabezas NP and some of his past records and follow up appointment were to a prior PCP, Dr José Antonio Chamberlain. He is very concerned having hospitalization outside the [facility] system will not allow good record sharing, as they have \"MY Chart\" with [facility] for his father. I re-assured his son that we also share records in our software charts with [facility] and read him back some recent [facility] encounters that I had reviewed. He continued to express frustration patient was here. I clarified that he did agree to this admission; he affirmed that he did, for now. He has concern that his medications will not be maintained correctly. I explained liberal BP and close following for his medication. Unit phone number given for communication. Patient's son affirmed patient is full code.        Andra Hernandez,   9/15/2019, 9:44 PM

## 2019-09-16 NOTE — CONSULTS
NEUROLOGY CONSULT NOTE    Patient ID:  Nuris Gillespie  [id number]  56 y.o.  3/11/1929    Date of Consultation:  September 16, 2019    Referring Physician: Victor Hugo Herbert DO    Reason for Consultation:  TIA        Subjective:       History of Present Illness: This is a 81 yo man very pleasant, with PMH of HTN, HLD, CAD, DM admitted on 15 Sep concerning for a TIA. He resides in a SNF and was noticed to have left sided weakness while walking to the dining room on 9/15/2019. EMS was called and reported left facial droop and flaccid left arm resolved en route; he presented to [facility] ER. In the ER all symptoms were resolved on arrival. CT head with no acute pathology. /44, HR 55, RR 14, pulse ox 97% Creatinine 1.83, BUN 21, GFR 42, Hgb 10.8, Hct 34.6 troponin 0.02. EKG sinus lilia 53 with 1st degree AV block . This am then pt states he is doing well, has no weakness as he can tell, recalls the breakfast he had. Has no complaints.      Patient Active Problem List    Diagnosis Date Noted    Acute renal failure ([address] 09/16/2019    TIA (transient ischemic attack) 09/15/2019    Acute pancreatitis 09/02/2019    Stercoral ulcer of rectum 03/16/2019    Internal hemorrhoids 03/16/2019    Diverticulosis 03/16/2019    GI bleed 80/22/0702    Acute embolic stroke ([address] 55/62/6088    PVC (premature ventricular contraction) 02/23/2019    Wears hearing aid 03/01/2016    Coronary atherosclerosis of native coronary artery     Other and unspecified hyperlipidemia     Malignant neoplasm of prostate ([address] 09/17/2010    Type 2 diabetes mellitus with hyperglycemia, without long-term current use of insulin ([address] 02/03/2009    Pure hypercholesterolemia 02/03/2009    Personal history of malignant neoplasm of prostate 02/03/2009    Essential hypertension 02/03/2009    Chronic allergic rhinitis 02/03/2009     Past Medical History:   Diagnosis Date    Arthritis     Cancer [facility])     Coronary atherosclerosis of native coronary artery     AS DESCRIBED IN CATH CARDIAC CATH IN 9/07 SHOWS 80% DISTAL LAD AND 40% PROXIMAL LAD DISEASE . HE IS ON MEDICAL TREATMENT. ASA AND B-BLOCKER 3/10 RENAL ARTERY DUPLEX : NL RT RENAL ARTERY WITH < 60% STENOSIS OF LEFT RENAL ARTERY    Diabetes ([facility] [address]     Glaucoma     Hypertension     Impotence of organic origin     Malignant neoplasm of prostate ([facility] [address]     Other and unspecified hyperlipidemia       Past Surgical History:   Procedure Laterality Date    COLONOSCOPY N/A 3/16/2019    COLONOSCOPY w/ polypectomies performed by Jazz Marcus MD at [facility]  3/16/2019         Budd Never  3/16/2019           Prior to Admission medications    Medication Sig Start Date End Date Taking? Authorizing Provider   labetalol (NORMODYNE) 100 mg tablet Take 100 mg by mouth every eight (8) hours. 9/5/19  Yes Provider, Historical   NIFEdipine ER (PROCARDIA XL) 30 mg ER tablet Take 30 mg by mouth daily. 9/6/19  Yes Provider, Historical   polyethylene glycol (MIRALAX) 17 gram/dose powder Take 17 g by mouth daily. 9/7/19   Provider, Historical   benzonatate (TESSALON) 100 mg capsule Take 1 Cap by mouth three (3) times daily as needed for Cough. 9/7/19   Provider, Historical   hydrALAZINE (APRESOLINE) 25 mg tablet Take 25 mg by mouth four (4) times daily. Provider, Historical   acetaminophen (TYLENOL) 325 mg tablet Take 325 mg by mouth every four (4) hours as needed for Pain. Provider, Historical   cloNIDine (CATAPRES) 0.3 mg/24 hr 1 Patch by TransDERmal route every seven (7) days. Provider, Historical   simvastatin (ZOCOR) 40 mg tablet Take 40 mg by mouth nightly. Provider, Historical   lactulose (CHRONULAC) 10 gram/15 mL solution Take 45 mL by mouth three (3) times daily as needed (constipation).  3/21/19   Michelle Bryant MD   montelukast (SINGULAIR) 10 mg tablet TAKE ONE TABLET BY MOUTH EVERY NIGHT AT BEDTIME 7/19/17   Provider, Historical   rivastigmine tartrate (EXELON) 1.5 mg capsule TAKE ONE CAPSULE BY MOUTH TWICE DAILY WITH MEALS 12/3/17   Provider, Historical   FERROUS SULFATE (FEOSOL PO) Take 325 mg by mouth two (2) times a day. for anemia      Provider, Historical   alfuzosin SR (UROXATRAL) 10 mg SR tablet Take 10 mg by mouth nightly as needed for Other (prostate). Provider, Historical   fexofenadine (ALLEGRA) 60 mg tablet Take 60 mg by mouth two (2) times a day. Provider, Historical     Allergies   Allergen Reactions    Memantine Other (comments)     nightmares      Social History     Tobacco Use    Smoking status: Never Smoker    Smokeless tobacco: Never Used   Substance Use Topics    Alcohol use: No      Family History   Problem Relation Age of Onset    Diabetes Other     Heart Disease Other           Review of Systems    Constitutional: No recent weight change, fever,fatigue, sleep difficulties, or loss of appetite. ENT/Mouth:  No hearing loss, ringing in the ears, chronic sinus problem, nose bleeds sore throat, voice change, hoarseness, swollen glands in neck, or difficulties with chewing and swallowing. Cardiovascular:  No chest pain/angina pectoris, palpitations,swelling of feet/ankles/hands, or calf pain while walking. Respiratory: No chronic or frequent coughs, spitting up blood, shortness of breath, asthma, or wheezing. Gastrointestinal: No abdominal pain, heartburn, nausea, vomiting, constipation, frequent diarrhea, rectal bleeding, or blood in stool. Genitourinary: No frequent urination, burning or painful urination, blood in urine, incontinence or dribbling. Musculoskeletal:   No joint pain, stiffness/swelling, weakness of muscles, muscle pain/cramp, or back pain. Integument:   No rash/itching, change in skin color, change in hair/nails, or change in color/size of moles.    Neurological:  No dizziness/vertigo, loss of consciousness, numbness/tingling sensation, tremors, weakness in limbs, difficulty with balance, frequent or recurring headaches, memory loss or confusion. Psychiatric:   No nervousness, depression, hallucinations, paranoia or suspiciousness. Endocrine: No excessive thirst or urination, heat or cold intolerance. Hematologic/Lymphatic: No bleeding/bruising tendency, phlebitis, or past transfusion. Objective:     Patient Vitals for the past 8 hrs:   BP Temp Pulse Resp SpO2   09/16/19 0818 192/84 97.8 °F (36.6 °C) (!) 56 16 97 %   09/16/19 0600 195/82 97.8 °F (36.6 °C) (!) 55 15 96 %   09/16/19 0400 195/81 97.6 °F (36.4 °C) (!) 55 16 95 %       General Exam  No acute distress, normal body habitus    HEENT: Normocephalic, atraumatic, Sclera anicteric, normal conjunctiva  Mucous membranes: normal color and hydration     CV: No carotid bruits,   Heart: regular to rate and rhythm. No murmurs     Neurologic Exam:    Mental status:  Awake, alert, oriented to person, place, time and circumstance  No visual spatial neglect or overt apraxia  Language: normal fluency and comprehension    Cranial nerves: PERRL, Extraocular movements intact and full, face symmetric to movement, Tongue midline with normal strength, palat symmetric    Motor: strength 5/5 throughout  No abnormal movements. No pronator drift    Coordination: Normal finger-nose-finger    DTRs (R/L)  Biceps: (2/2)  Brachorad (2/2)  Triceps: (2/2)   Patellar (2/2)  Ankles (2/2)      Sensation: Intact and symmetric to light touch, temperature and vibratory sense.      Gait: not tested    Data Review:    Recent Results (from the past 24 hour(s))   METABOLIC PANEL, BASIC    Collection Time: 09/15/19 10:30 AM   Result Value Ref Range    Sodium 140 136 - 145 mmol/L    Potassium 4.1 3.5 - 5.5 mmol/L    Chloride 106 100 - 111 mmol/L    CO2 25 21 - 32 mmol/L    Anion gap 9 3.0 - 18 mmol/L    Glucose 234 (H) 74 - 99 mg/dL    BUN 21 (H) 7.0 - 18 MG/DL    Creatinine 1.83 (H) 0.6 - 1.3 MG/DL    BUN/Creatinine ratio 11 (L) 12 - 20      GFR est AA 42 (L) >60 ml/min/1.73m2    GFR est non-AA 35 (L) >60 ml/min/1.73m2    Calcium 9.2 8.5 - 10.1 MG/DL   CBC WITH AUTOMATED DIFF    Collection Time: 09/15/19 10:30 AM   Result Value Ref Range    WBC 8.1 4.6 - 13.2 K/uL    RBC 3.69 (L) 4.70 - 5.50 M/uL    HGB 10.8 (L) 13.0 - 16.0 g/dL    HCT 34.6 (L) 36.0 - 48.0 %    MCV 93.8 74.0 - 97.0 FL    MCH 29.3 24.0 - 34.0 PG    MCHC 31.2 31.0 - 37.0 g/dL    RDW 13.3 11.6 - 14.5 %    PLATELET 725 902 - 055 K/uL    MPV 9.5 9.2 - 11.8 FL    NEUTROPHILS 75 (H) 40 - 73 %    LYMPHOCYTES 15 (L) 21 - 52 %    MONOCYTES 6 3 - 10 %    EOSINOPHILS 3 0 - 5 %    BASOPHILS 1 0 - 2 %    ABS. NEUTROPHILS 6.1 1.8 - 8.0 K/UL    ABS. LYMPHOCYTES 1.2 0.9 - 3.6 K/UL    ABS. MONOCYTES 0.5 0.05 - 1.2 K/UL    ABS. EOSINOPHILS 0.2 0.0 - 0.4 K/UL    ABS.  BASOPHILS 0.1 0.0 - 0.1 K/UL    DF AUTOMATED     DRUG SCREEN, URINE    Collection Time: 09/15/19 10:30 AM   Result Value Ref Range    BENZODIAZEPINES NEGATIVE  NEG      BARBITURATES NEGATIVE  NEG      THC (TH-CANNABINOL) NEGATIVE  NEG      OPIATES NEGATIVE  NEG      PCP(PHENCYCLIDINE) NEGATIVE  NEG      COCAINE NEGATIVE  NEG      AMPHETAMINES NEGATIVE  NEG      METHADONE NEGATIVE  NEG      HDSCOM (NOTE)    CARDIAC PANEL,(CK, CKMB & TROPONIN)    Collection Time: 09/15/19 10:30 AM   Result Value Ref Range    CK 75 39 - 308 U/L    CK - MB 1.3 <3.6 ng/ml    CK-MB Index 1.7 0.0 - 4.0 %    Troponin-I, QT 0.02 0.0 - 0.045 NG/ML   EKG, 12 LEAD, INITIAL    Collection Time: 09/15/19 10:38 AM   Result Value Ref Range    Ventricular Rate 53 BPM    Atrial Rate 53 BPM    P-R Interval 234 ms    QRS Duration 158 ms    Q-T Interval 504 ms    QTC Calculation (Bezet) 472 ms    Calculated P Axis 66 degrees    Calculated R Axis -25 degrees    Calculated T Axis 10 degrees    Diagnosis       Sinus bradycardia with 1st degree AV block  Right bundle branch block  Moderate voltage criteria for LVH, may be normal variant  Abnormal ECG  No previous ECGs available  Confirmed by Zane Saleh MD, [place] ([id number]) on 9/15/2019 12:58:14 PM     GLUCOSE, POC    Collection Time: 09/15/19  5:14 PM   Result Value Ref Range    Glucose (POC) 187 (H) 70 - 110 mg/dL   GLUCOSE, POC    Collection Time: 09/15/19 10:44 PM   Result Value Ref Range    Glucose (POC) 139 (H) 70 - 110 mg/dL   LIPID PANEL    Collection Time: 09/16/19  2:22 AM   Result Value Ref Range    LIPID PROFILE          Cholesterol, total 92 <200 MG/DL    Triglyceride 76 <150 MG/DL    HDL Cholesterol 37 (L) 40 - 60 MG/DL    LDL, calculated 39.8 0 - 100 MG/DL    VLDL, calculated 15.2 MG/DL    CHOL/HDL Ratio 2.5 0 - 5.0     HEMOGLOBIN A1C WITH EAG    Collection Time: 09/16/19  2:32 AM   Result Value Ref Range    Hemoglobin A1c 6.0 (H) 4.2 - 5.6 %    Est. average glucose 126 mg/dL   GLUCOSE, POC    Collection Time: 09/16/19  6:22 AM   Result Value Ref Range    Glucose (POC) 164 (H) 70 - 110 mg/dL         Radiology studies:   Head CT - negative for acute intracranial pathology    Assessment: This is a 81 yo man with hx of mild cognitive impairment on exelon patch, HTN, DM, CAD admitted with transient acute onset left sided weakness with complete resolution. The patient Neuro exam non-focal this am, mild language dysarthria - expected given the hx of MCI. He is doing very well given his age. I agree with the brain MRI, he should be on ASA ,  And ok to gradually resume BP meds this afternoon, expect to be discharge by tomorrow 17 Sept       Principal Problem:    TIA (transient ischemic attack) (9/15/2019)    Active Problems:    Coronary atherosclerosis of native coronary artery ()      Overview: AS DESCRIBED IN CATH CARDIAC CATH IN 9/07 SHOWS 80% DISTAL LAD AND 40%       PROXIMAL LAD DISEASE . HE IS ON MEDICAL TREATMENT.  ASA AND B-BLOCKER 3/10       RENAL ARTERY DUPLEX : NL RT RENAL ARTERY WITH < 60% STENOSIS OF LEFT RENAL       ARTERY      Type 2 diabetes mellitus with hyperglycemia, without long-term current use of insulin ([address] (2/3/2009)      Essential hypertension (2/3/2009)        Plan:     - reyna MRI - at list the DWI sequence  - gradually resume BP meds this afternoon  - Continue ASA  - doesn't look like he was on ASA at SNF ; if he was taking asa , switch to plavix 75 mg qday  - ok to continue low dose statins   - plan to discharge tomorrow morning            Curry Serrato MD  Adult Neurologist  9/16/2019

## 2019-09-16 NOTE — PROGRESS NOTES
Reason for Admission:   TIA (transient ischemic attack) [G45.9]               RRAT Score:     26           Resources/supports as identified by patient/family:       Top Challenges facing patient (as identified by patient/family and CM): Finances/Medication cost?     No   Transportation      family  Support system or lack thereof? Family support  Living arrangements? Lives at [facility] [place]   Self-care/ADLs/Cognition? Needs assistance sometimes        Current Advanced Directive/Advance Care Plan:   no                          Plan for utilizing home health: To be determined                      Likelihood of readmission:   HIGH    Transition of Care Plan:                    Initial assessment completed with patient and Zohreh Oglesby of [facility] nursing. Cognitive status of patient: oriented to time, place, person and situation. Face sheet information confirmed:  yes. The patient designates his son Lisa Younger to participate in his discharge plan and to receive any needed information. This patient lives in [place] [address] ([facility])  Patient is not able to navigate steps as needed. Prior to hospitalization, patient was considered to be independent with ADLs/IADLS : yes . If not independent,  patient needs assist with : food preparation and cooking    Patient has a current ACP document on file: no  [facility] transportation will be available to transport patient upon discharge. The patient already has [address] equipment available in the home. Patient is not currently active with home health. Patient has not stayed in a skilled nursing facility or rehab. Was  stay within last 60 days : no. This patient is on dialysis :no  Discharge plan is to be determined    The Assisted Living administers pt's medications. Patient's current insurance is [employer]  PCP Verified by CM:  Yes  Palliative Care Criteria Met (RRAT>21 & CHF Dx)?: No  Mode of Transport at Discharge: BLS  Transition of Care Consult (CM Consult): Discharge Planning  Physical Therapy Consult: Yes  Occupational Therapy Consult: Yes  Current Support Network: Assisted Living  Confirm Follow Up Transport: Family  Plan discussed with Pt/Family/Caregiver: Yes  Discharge Location  Discharge Placement: Assisted Living        ADITYA Martin RN  Care Management  Pager: [phone]

## 2019-09-16 NOTE — PROGRESS NOTES
Problem: Dysphagia (Adult)  Goal: *Acute Goals and Plan of Care (Insert Text)  Description  Patient will:  1. Tolerate PO trials with 0 s/s overt distress in 4/5 trials  2. Utilize compensatory swallow strategies/maneuvers (decrease bite/sip, size/rate, alt. liq/sol) with min cues in 4/5 trials  3. Perform oral-motor/laryngeal exercises to increase oropharyngeal swallow function with min cues  4. Complete an objective swallow study (i.e., MBSS) to assess swallow integrity, r/o aspiration, and determine of safest LRD, min A    Rec:     Puree diet with pudding-thick liquids  Aspiration precautions  HOB >45 during po intake, remain >30 for 30-45 minutes after po   Small bites/sips; alternate liquid/solid with slow feeding rate   Oral care TID  Meds in puree  MBS to further assess oropharyngeal swallow fxn     Outcome: Progressing Towards Goal    SPEECH LANGUAGE PATHOLOGY BEDSIDE SWALLOW EVALUATION/TREATMENT    Patient: Arlin Krishnamurthy (67 y.o. male)  Date: 9/16/2019  Primary Diagnosis: TIA (transient ischemic attack) [G45.9]        Precautions: aspiration     PLOF: As per H&P    ASSESSMENT :  Based on the objective data described below, the patient presents with mod oral and mod-sev pharyngeal dysphagia. Pt completed MBS at this facility on 3/19/19 with the following results: \"mod-sev pharyngeal dysphagia c/b SILENT aspiration of nectar-thick liquids both during and the after swallow as well as SILENT penetration to chords with mech soft trials. \" Most recent MBS completed at [facility] on 6/19/19 with the following results: \"Thin barium: Drinking from a cup: Swallow delay with premature spillage. Penetration to the vocal cords without evidence of aspiration. Drinking from a straw: Swallow delay with premature spillage. Silent aspiration. \" Nectar and honey-thick liquids were not tested on last MBS. This AM, pt tolerated pudding with min altered vocal quality. Laryngeal elevation was weak to palpation.  Pt with >50% lingual stasis of [facility] soft bolus presentation. Rec initiation of puree diet with pudding-thick liquids, aspiration precautions, oral care TID, and meds in puree. Rec MBS to further assess oropharyngeal swallow fxn and determine LRD. D/w pt, RN, and in CVA rounds. ST will continue to follow. TREATMENT :  Skilled therapy initiated; Educated pt on aspiration precautions and importance of compensatory swallow techniques to decrease aspiration risk (decrease rate of intake & sip/bite size, upright @HOB for all po intake and ~30 minutes after po); verbalized comprehension. Patient will benefit from skilled intervention to address the above impairments. Patient's rehabilitation potential is considered to be Fair  Factors which may influence rehabilitation potential include:   ?            Mental ability/status  ? Medical condition     PLAN :  Recommendations and Planned Interventions: See above  Frequency/Duration: Patient will be followed by speech-language pathology 1-2 times per day/3-5 days per week to address goals. Discharge Recommendations: [place] and To Be Determined     SUBJECTIVE:   Patient stated Its nice to see you again! . OBJECTIVE:     Past Medical History:   Diagnosis Date    Arthritis     Cancer ([address]     Coronary atherosclerosis of native coronary artery     AS DESCRIBED IN CATH CARDIAC CATH IN 9/07 SHOWS 80% DISTAL LAD AND 40% PROXIMAL LAD DISEASE . HE IS ON MEDICAL TREATMENT.  ASA AND B-BLOCKER 3/10 RENAL ARTERY DUPLEX : NL RT RENAL ARTERY WITH < 60% STENOSIS OF LEFT RENAL ARTERY    Diabetes ([address]     Glaucoma     Hypertension     Impotence of organic origin     Malignant neoplasm of prostate ([address]     Other and unspecified hyperlipidemia      Past Surgical History:   Procedure Laterality Date    COLONOSCOPY N/A 3/16/2019    COLONOSCOPY w/ polypectomies performed by Saqib Mejia MD at [facility]  3/16/2019         COLONOSCOPY,REMV JARROD SCOTT  3/16/2019          Prior Level of Function/Home Situation: see below  Home Situation  Home Environment: Assisted living  One/Two Story Residence: One story  Living Alone: No  Support Systems: Assisted living  Patient Expects to be Discharged to[de-identified] Assisted living  Current DME Used/Available at Home: [address] prior to admission: unknown  Current Diet:  puree with pudding-thick     Cognitive and Communication Status:  Neurologic State: Alert  Orientation Level: Oriented X4  Cognition: Follows commands  Oral Assessment:  Oral Assessment  Labial: No impairment  Dentition: Edentulous  Oral Hygiene: Adequate  Lingual: Decreased rate;Decreased strength  Velum: No impairment  Mandible: No impairment  P.O. Trials:  Patient [facility]: [facility]  Vocal quality prior to P.O.: No impairment  Consistency Presented: Pudding;Mechanical soft  How Presented: Self-fed/presented;Spoon  Bolus Acceptance: No impairment  Bolus Formation/Control: Impaired  Type of Impairment: Mastication;Delayed  Propulsion: Delayed (# of seconds); Discoordination  Oral Residue: Greater than 50% of bolus; Lingual  Initiation of Swallow: Delayed (# of seconds)  Laryngeal Elevation: Weak  Aspiration Signs/Symptoms: Change vocal quality  Pharyngeal Phase Characteristics: Suspected pharyngeal residue; Altered vocal quality  Effective Modifications: Small sips and bites; Alternate liquids/solids  Cues for Modifications: Moderate     Oral Phase Severity: Moderate  Pharyngeal Phase Severity : Moderate-severe    PAIN:  Start of Eval: 0  End of Eval: 0     After treatment:   ?            Patient left in no apparent distress sitting up in chair  ? Patient left in no apparent distress in bed  ? Call bell left within reach  ? Nursing notified  ? Family present  ? Caregiver present  ?             Bed alarm activated    COMMUNICATION/EDUCATION:   ?            Aspiration precautions; swallow safety; compensatory techniques. ?            Patient/family have participated as able in goal setting and plan of care. ? Posted safety precautions in patient's room.     Thank you for this referral.    Carmencita Allen M.S. CCC-SLP/L  Speech-Language Pathologist

## 2019-09-16 NOTE — PROGRESS NOTES
Problem: Mobility Impaired (Adult and Pediatric)  Goal: *Acute Goals and Plan of Care (Insert Text)  Description  Physical Therapy Goals  Initiated 9/16/2019 and to be accomplished within 7 day(s)  1. Patient will move from supine to sit and sit to supine  in bed with modified independence. 2.  Patient will transfer from bed to chair and chair to bed with modified independence using the least restrictive device. 3.  Patient will perform sit to stand with modified independence. 4.  Patient will ambulate with modified independence for 250 feet with the least restrictive device. To prepare pt for mobility at [facility]    PLOF:  Pt lives in an [place] and ambulates to all meals and activities with a Rollator walker   Outcome: Progressing Towards Goal    PHYSICAL THERAPY EVALUATION    Patient: Tyrell Weiner (85 y.o. male)  Date: 9/16/2019  Primary Diagnosis: TIA (transient ischemic attack) [G45.9]        Precautions:   Aspiration, Fall    PLOF: See goals section above    ASSESSMENT :  Based on the objective data described below, the patient presents with impaired gait, decreased functional activity tolerance following admission for TIA. Pt was cleared by nursing to work with therapy. Pt was received semi-reclined in bed, agreeable to participate in PT . Pt was able to perform SLR with both LEs in supine. Pt performed supine-sit with supervision and sit-stand with CGA. Pt displayed good sitting balance and good static/fair dynamic standing balance. Pt ambulated 100 with RW , with CGA and several changes in direction. He displayed decreased B LE clearance during ambulation, decreased step length and decreased pace. Pt returned to room and performed stand-sit transfer with CGA and sit-supine with SBA. At conclusion of session, pt was left resting comfortably in bed, needs met, call bell in reach, nurse notified.        Patient will benefit from skilled intervention to address the above impairments. Patient's rehabilitation potential is considered to be Good  Factors which may influence rehabilitation potential include:   ? None noted  ? Mental ability/status  ? Medical condition  ? Home/family situation and support systems  ? Safety awareness  ? Pain tolerance/management  ? Other:      PLAN :  Recommendations and Planned Interventions:   ?           Bed Mobility Training             ? Neuromuscular Re-Education  ? Transfer Training                   ? Orthotic/Prosthetic Training  ? Gait Training                          ? Modalities  ? Therapeutic Exercises           ? Edema Management/Control  ? Therapeutic Activities            ? Family Training/Education  ? Patient Education  ? Other (comment):    Frequency/Duration: Patient will be followed by physical therapy 1-2 times per day to address goals. Discharge Recommendations: senior care  (return to prior facility)  Further Equipment Recommendations for Discharge: N/A     SUBJECTIVE:   Patient stated  I usually use a walker to walk.     OBJECTIVE DATA SUMMARY:     Past Medical History:   Diagnosis Date    Arthritis     Cancer [address]     Coronary atherosclerosis of native coronary artery     AS DESCRIBED IN CATH CARDIAC CATH IN 9/07 SHOWS 80% DISTAL LAD AND 40% PROXIMAL LAD DISEASE . HE IS ON MEDICAL TREATMENT.  ASA AND B-BLOCKER 3/10 RENAL ARTERY DUPLEX : NL RT RENAL ARTERY WITH < 60% STENOSIS OF LEFT RENAL ARTERY    Diabetes ([address]     Glaucoma     Hypertension     Impotence of organic origin     Malignant neoplasm of prostate ([address]     Other and unspecified hyperlipidemia      Past Surgical History:   Procedure Laterality Date    COLONOSCOPY N/A 3/16/2019    COLONOSCOPY w/ polypectomies performed by Novella Cockayne, MD at [facility]  3/16/2019         Afua Benton  3/16/2019 Barriers to Learning/Limitations: None  Compensate with: N/A  Home Situation:  Home Situation  Home Environment: Assisted living  One/Two Story Residence: One story  Living Alone: No  Support Systems: Assisted living  Patient Expects to be Discharged to[de-identified] Assisted living  Current DME Used/Available at Home: [place] [employer], rollpamela  Critical Behavior:  Neurologic State: Alert  Orientation Level: Oriented X4  Cognition: Follows commands     Psychosocial  Patient Behaviors: Calm; Cooperative  Purposeful Interaction: Yes  Pt Identified Daily Priority: Clinical issues (comment)  Caritas Process: Nurture loving kindness;Establish trust;Teaching/learning; Attend basic human needs  Caring Interventions: Reassure; Therapeutic modalities  Reassure: Therapeutic listening; Informing;Caring rounds  Therapeutic Modalities: Humor    Strength:    Strength: Generally decreased, functional    Tone & Sensation:   Tone: Normal    Sensation: Intact    Range Of Motion:  AROM: Within functional limits    Functional Mobility:  Bed Mobility:  Supine to Sit: Minimum assistance  Sit to Supine: Contact guard assistance    Transfers:  Sit to Stand: Contact guard assistance  Stand to Sit: Contact guard assistance    Balance:   Sitting: Intact  Standing: Intact; With support  Ambulation/Gait Training:  Distance (ft): 100 Feet (ft)  Assistive Device: Walker, rollator  Ambulation - Level of Assistance: Contact guard assistance     Gait Description (WDL): Exceptions to WDL  Gait Abnormalities: Decreased step clearance      Pain:  Pain level pre-treatment: 0/10   Pain level post-treatment: 0/10   Pain Intervention(s) : N/A    Activity Tolerance:   Good  Please refer to the flowsheet for vital signs taken during this treatment. After treatment:   ?         Patient left in no apparent distress sitting up in chair  ? Patient left in no apparent distress in bed  ? Call bell left within reach  ? Nursing notified  ?          Caregiver present  ? Bed alarm activated  ? SCDs applied    COMMUNICATION/EDUCATION:   ?         Role of Physical Therapy in the acute care setting. ?         Fall prevention education was provided and the patient/caregiver indicated understanding. ? Patient/family have participated as able in goal setting and plan of care. ?         Patient/family agree to work toward stated goals and plan of care. ?         Patient understands intent and goals of therapy, but is neutral about his/her participation. ? Patient is unable to participate in goal setting/plan of care: ongoing with therapy staff. ?         Other:     Thank you for this referral.  General Bruce, PT   Time Calculation: 23 mins      Eval Complexity: History: MEDIUM  Complexity : 1-2 comorbidities / personal factors will impact the outcome/ POC Exam:MEDIUM Complexity : 3 Standardized tests and measures addressing body structure, function, activity limitation and / or participation in recreation  Presentation: MEDIUM Complexity : Evolving with changing characteristics  Clinical Decision Making:Medium Complexity    Overall Complexity:MEDIUM

## 2019-09-16 NOTE — INTERDISCIPLINARY ROUNDS
Interdisciplinary STROKE Rounds       Recommendations:     Recommendations are as follows:   1. Jonathon Gooden, Dr. Arthur Santoyo will see today. MRI has been ordered. She will consider need in 81 yo.  2. Continue education about stroke risk factors, stroke recognition and medication compliance. Stroke book needs to be given. 3. Failed dysphagia screen. SLP Mary Ann Mac has seen him today and put him on pudding thick liquids with a recommended MBS. Stroke Meds currently prescribed: ASA 81 mg, atorvastatin 80 mg  DVT prophylaxis: heparin sq    Tests ordered:MRI, MBS    Discharge Plan: TBD, will be assessed by therapy today. Came from a SNF    Patient admitted for: left sided weakness and facial droop      Discipline Participation:   Interdisciplinary rounds were conducted by:  Dr. Aden Durand, Neuroscience Medical director,   Dr. Arthur Santoyo, Neurologist  Charo Geronimo RN, stroke coordinator  George Kirkland, SLP,  [facility] liaison  Marni CORCORAN, the patient's nurse. Discussion included patient's current condition, any tests and patient's expected discharge outcome.

## 2019-09-16 NOTE — PROGRESS NOTES
[facility]  Progress Note    Patient: Jeannette Cornejo Age: 80 y.o. : 3/11/1929 MR#: [id number] SSN: [id number]  Date: 2019    Subjective/24-hour events:     BPs running high this AM but no new issues o/w. Denies chest pain, SOB, dizziness/lightheadedness. Assessment:   TIA vs CVA  HTN  DM 2  Anemia chronic  Chronic constipation  Advanced age  Hx GI bleed secondary to stercoral ulcer    Plan:  Await MRI. Resume antihypertensive therapy slowly. Iron studies. Anticipate return to NH on discharge. Supportive care o/w. Follow. Case discussed with:  [x]Patient  []Family  [x]Nursing  []Case Management  DVT Prophylaxis:  []Lovenox  []Hep SQ  []SCDs  []Coumadin   []On Heparin gtt    Objective:   VS:   Visit Vitals  BP (!) 222/89 (BP 1 Location: Left arm, BP Patient Position: At rest)   Pulse (!) 53   Temp 97.4 °F (36.3 °C)   Resp 16   Ht 5' 10\" (1.778 m)   Wt 84.8 kg (186 lb 13.8 oz)   SpO2 98%   BMI 26.81 kg/m²      Tmax/24hrs: Temp (24hrs), Av.7 °F (36.5 °C), Min:97.4 °F (36.3 °C), Max:98.1 °F (36.7 °C)      Intake/Output Summary (Last 24 hours) at 2019 1442  Last data filed at 2019 0800  Gross per 24 hour   Intake 1424.5 ml   Output 2725 ml   Net -1300.5 ml       General:  In NAD. Cardiovascular: RRR. Pulmonary:  Clear, no wheezes. Effort nonlabored. GI:  Abdomen soft, NTTP. Extremities:  Warm, no ischemia. Neuro:  Awake and alert.     Labs:    Recent Results (from the past 24 hour(s))   GLUCOSE, POC    Collection Time: 09/15/19  5:14 PM   Result Value Ref Range    Glucose (POC) 187 (H) 70 - 110 mg/dL   GLUCOSE, POC    Collection Time: 09/15/19 10:44 PM   Result Value Ref Range    Glucose (POC) 139 (H) 70 - 110 mg/dL   LIPID PANEL    Collection Time: 19  2:22 AM   Result Value Ref Range    LIPID PROFILE          Cholesterol, total 92 <200 MG/DL    Triglyceride 76 <150 MG/DL    HDL Cholesterol 37 (L) 40 - 60 MG/DL    LDL, calculated 39.8 0 - 100 MG/DL    VLDL, calculated 15.2 MG/DL    CHOL/HDL Ratio 2.5 0 - 5.0     HEMOGLOBIN A1C WITH EAG    Collection Time: 09/16/19  2:32 AM   Result Value Ref Range    Hemoglobin A1c 6.0 (H) 4.2 - 5.6 %    Est. average glucose 126 mg/dL   GLUCOSE, POC    Collection Time: 09/16/19  6:22 AM   Result Value Ref Range    Glucose (POC) 164 (H) 70 - 110 mg/dL   GLUCOSE, POC    Collection Time: 09/16/19 11:24 AM   Result Value Ref Range    Glucose (POC) 173 (H) 70 - 110 mg/dL       Signed By: Jaycee Ragland MD     September 16, 2019

## 2019-09-17 LAB
GLUCOSE BLD STRIP.AUTO-MCNC: 114 MG/DL (ref 70–110)
GLUCOSE BLD STRIP.AUTO-MCNC: 129 MG/DL (ref 70–110)
GLUCOSE BLD STRIP.AUTO-MCNC: 160 MG/DL (ref 70–110)
GLUCOSE BLD STRIP.AUTO-MCNC: 162 MG/DL (ref 70–110)
IRON SATN MFR SERPL: 21 %
IRON SERPL-MCNC: 48 UG/DL (ref 50–175)
TIBC SERPL-MCNC: 228 UG/DL (ref 250–450)

## 2019-09-17 PROCEDURE — 74011250636 HC RX REV CODE- 250/636: Performed by: HOSPITALIST

## 2019-09-17 PROCEDURE — 74011250637 HC RX REV CODE- 250/637: Performed by: HOSPITALIST

## 2019-09-17 PROCEDURE — 74011636637 HC RX REV CODE- 636/637: Performed by: FAMILY MEDICINE

## 2019-09-17 PROCEDURE — 65660000000 HC RM CCU STEPDOWN

## 2019-09-17 PROCEDURE — 36415 COLL VENOUS BLD VENIPUNCTURE: CPT

## 2019-09-17 PROCEDURE — 74011250637 HC RX REV CODE- 250/637: Performed by: FAMILY MEDICINE

## 2019-09-17 PROCEDURE — 83540 ASSAY OF IRON: CPT

## 2019-09-17 PROCEDURE — 82962 GLUCOSE BLOOD TEST: CPT

## 2019-09-17 RX ORDER — NIFEDIPINE 30 MG/1
30 TABLET, EXTENDED RELEASE ORAL ONCE
Status: COMPLETED | OUTPATIENT
Start: 2019-09-17 | End: 2019-09-17

## 2019-09-17 RX ORDER — SIMVASTATIN 40 MG/1
40 TABLET, FILM COATED ORAL
Qty: 15 TAB | Refills: 0 | Status: SHIPPED | OUTPATIENT
Start: 2019-09-17

## 2019-09-17 RX ORDER — NIFEDIPINE 30 MG/1
30 TABLET, EXTENDED RELEASE ORAL DAILY
Status: DISCONTINUED | OUTPATIENT
Start: 2019-09-18 | End: 2019-09-17

## 2019-09-17 RX ORDER — HYDRALAZINE HYDROCHLORIDE 25 MG/1
25 TABLET, FILM COATED ORAL 4 TIMES DAILY
Qty: 60 TAB | Refills: 0 | Status: SHIPPED | OUTPATIENT
Start: 2019-09-17 | End: 2019-09-17 | Stop reason: SDUPTHER

## 2019-09-17 RX ORDER — RIVASTIGMINE TARTRATE 1.5 MG/1
1.5 CAPSULE ORAL 2 TIMES DAILY WITH MEALS
Qty: 30 CAP | Refills: 0 | Status: SHIPPED | OUTPATIENT
Start: 2019-09-17

## 2019-09-17 RX ORDER — CLONIDINE 0.3 MG/24H
1 PATCH, EXTENDED RELEASE TRANSDERMAL
Qty: 4 PATCH | Refills: 0 | Status: SHIPPED | OUTPATIENT
Start: 2019-09-17

## 2019-09-17 RX ORDER — ACETAMINOPHEN 325 MG/1
325 TABLET ORAL
Qty: 12 TAB | Refills: 0 | Status: SHIPPED | OUTPATIENT
Start: 2019-09-17

## 2019-09-17 RX ORDER — LANOLIN ALCOHOL/MO/W.PET/CERES
325 CREAM (GRAM) TOPICAL 2 TIMES DAILY
Qty: 30 TAB | Refills: 0 | Status: SHIPPED | OUTPATIENT
Start: 2019-09-17

## 2019-09-17 RX ORDER — GUAIFENESIN 100 MG/5ML
81 LIQUID (ML) ORAL DAILY
Qty: 15 TAB | Refills: 0 | Status: SHIPPED | OUTPATIENT
Start: 2019-09-18

## 2019-09-17 RX ORDER — POLYETHYLENE GLYCOL 3350 17 G/17G
17 POWDER, FOR SOLUTION ORAL DAILY
Qty: 255 G | Refills: 0 | Status: SHIPPED | OUTPATIENT
Start: 2019-09-17

## 2019-09-17 RX ORDER — NIFEDIPINE 30 MG/1
30 TABLET, EXTENDED RELEASE ORAL DAILY
Qty: 15 TAB | Refills: 0 | Status: SHIPPED | OUTPATIENT
Start: 2019-09-17

## 2019-09-17 RX ORDER — BENZONATATE 100 MG/1
100 CAPSULE ORAL
Qty: 12 CAP | Refills: 0 | Status: SHIPPED | OUTPATIENT
Start: 2019-09-17

## 2019-09-17 RX ORDER — LABETALOL 100 MG/1
100 TABLET, FILM COATED ORAL EVERY 8 HOURS
Status: DISCONTINUED | OUTPATIENT
Start: 2019-09-17 | End: 2019-09-18 | Stop reason: HOSPADM

## 2019-09-17 RX ORDER — ALFUZOSIN HYDROCHLORIDE 10 MG/1
10 TABLET, EXTENDED RELEASE ORAL
Qty: 15 TAB | Refills: 0 | Status: SHIPPED | OUTPATIENT
Start: 2019-09-17

## 2019-09-17 RX ORDER — MONTELUKAST SODIUM 10 MG/1
10 TABLET ORAL DAILY
Qty: 15 TAB | Refills: 0 | Status: SHIPPED | OUTPATIENT
Start: 2019-09-17 | End: 2019-10-02

## 2019-09-17 RX ORDER — HYDRALAZINE HYDROCHLORIDE 25 MG/1
25 TABLET, FILM COATED ORAL 4 TIMES DAILY
Status: DISCONTINUED | OUTPATIENT
Start: 2019-09-17 | End: 2019-09-18 | Stop reason: HOSPADM

## 2019-09-17 RX ORDER — HYDRALAZINE HYDROCHLORIDE 50 MG/1
50 TABLET, FILM COATED ORAL 3 TIMES DAILY
Qty: 45 TAB | Refills: 0 | Status: SHIPPED | OUTPATIENT
Start: 2019-09-17

## 2019-09-17 RX ORDER — LABETALOL 100 MG/1
100 TABLET, FILM COATED ORAL EVERY 8 HOURS
Qty: 45 TAB | Refills: 0 | Status: SHIPPED | OUTPATIENT
Start: 2019-09-17

## 2019-09-17 RX ORDER — FEXOFENADINE HCL 60 MG
60 TABLET ORAL 2 TIMES DAILY
Qty: 30 TAB | Refills: 0 | Status: SHIPPED | OUTPATIENT
Start: 2019-09-17

## 2019-09-17 RX ORDER — NIFEDIPINE 30 MG/1
30 TABLET, EXTENDED RELEASE ORAL DAILY
Status: DISCONTINUED | OUTPATIENT
Start: 2019-09-18 | End: 2019-09-18 | Stop reason: HOSPADM

## 2019-09-17 RX ADMIN — INSULIN LISPRO 2 UNITS: 100 INJECTION, SOLUTION INTRAVENOUS; SUBCUTANEOUS at 13:02

## 2019-09-17 RX ADMIN — HEPARIN SODIUM 5000 UNITS: 5000 INJECTION INTRAVENOUS; SUBCUTANEOUS at 06:18

## 2019-09-17 RX ADMIN — LABETALOL HYDROCHLORIDE 100 MG: 100 TABLET, FILM COATED ORAL at 14:25

## 2019-09-17 RX ADMIN — NIFEDIPINE 30 MG: 30 TABLET, FILM COATED, EXTENDED RELEASE ORAL at 17:59

## 2019-09-17 RX ADMIN — HYDRALAZINE HYDROCHLORIDE 25 MG: 25 TABLET, FILM COATED ORAL at 13:02

## 2019-09-17 RX ADMIN — Medication 10 ML: at 06:17

## 2019-09-17 RX ADMIN — HYDRALAZINE HYDROCHLORIDE 25 MG: 25 TABLET, FILM COATED ORAL at 17:59

## 2019-09-17 RX ADMIN — HEPARIN SODIUM 5000 UNITS: 5000 INJECTION INTRAVENOUS; SUBCUTANEOUS at 13:08

## 2019-09-17 RX ADMIN — Medication 10 ML: at 13:06

## 2019-09-17 RX ADMIN — ASPIRIN 81 MG 81 MG: 81 TABLET ORAL at 09:44

## 2019-09-17 NOTE — ROUTINE PROCESS
Bedside and Verbal shift change report given to NILO Serrano (oncoming nurse) by Elsie Toney (offgoing nurse). Report included the following information SBAR, Kardex, Intake/Output and MAR.

## 2019-09-17 NOTE — HOME CARE
Received HH referral from  Casie Zaldivar), but upon entering patient's room,  Casie Zaldivar) now says patient's son does not want [address] to follow patient at [place], therefore [facility] referral for [address]  will be cancelled. LUZ MARINA BARRAZA.

## 2019-09-17 NOTE — PROGRESS NOTES
Per Dr. Calhoun Mcardle, pt will be discharged today and he is requesting for pt's medication list from The [address] [place] [address] [place]. Called [place] and left a message for the nurses. 1250: Discharge summary and prescriptions faxed to [place]  Pt wants CM to call his son Mackenzie Montalvo to pick him up. Called [facility] but no answer and voice mail full. Called pt's son Tish Glaser and he stated he lives in [place]. Informed him we can arrange for transport to  pt. He wants to speak with pt's physician. Informed Dr. Calhoun Mcardle. 1330:  Pt's son Mackenzie Montalvo called back. He will be picking up pt to [place]. Called [place] and left a message. 1450:  Pt has home health orders. Called [place] and left messages. Spoke with pt's son Mackenzie Montalvo about [address] [facility] orders and he called his brother Tish Glaser and Tish Lemonford stated pt does not need home health because he is already getting pt/ot and speech at [facility]. Called [place] back and no one picking up the phone still. 1520: Faxed the home health orders to [place] and informed pt's son Mackenzie Montalvo.       JONATHAN MendozaN RN  Care Management  Pager: [phone]

## 2019-09-17 NOTE — PROGRESS NOTES
Neurology Progress Note    Patient ID:  Keya Bobby  [id number]  89 y.o.  3/11/1929    Subjective:      Patient was seen today as follow up for TIA suspected in right MCA distribution with full recovery. He is doing well, has no complains. Current Facility-Administered Medications   Medication Dose Route Frequency    glucose chewable tablet 16 g  4 Tab Oral PRN    glucagon (GLUCAGEN) injection 1 mg  1 mg IntraMUSCular PRN    dextrose 10% infusion 125-250 mL  125-250 mL IntraVENous PRN    cloNIDine (CATAPRES) 0.3 mg/24 hr patch 1 Patch  1 Patch TransDERmal Q7D    insulin lispro (HUMALOG) injection   SubCUTAneous AC&HS    sodium chloride (NS) flush 5-40 mL  5-40 mL IntraVENous Q8H    sodium chloride (NS) flush 5-40 mL  5-40 mL IntraVENous PRN    aspirin chewable tablet 81 mg  81 mg Oral DAILY    ondansetron (ZOFRAN) injection 4 mg  4 mg IntraVENous Q6H PRN    atorvastatin (LIPITOR) tablet 80 mg  80 mg Oral QHS    acetaminophen (TYLENOL) tablet 650 mg  650 mg Oral Q4H PRN    heparin (porcine) injection 5,000 Units  5,000 Units SubCUTAneous Q8H    labetalol (NORMODYNE;TRANDATE) 20 mg/4 mL (5 mg/mL) injection 5 mg  5 mg IntraVENous Q10MIN PRN    famotidine (PF) (PEPCID) 20 mg in sodium chloride 0.9% 10 mL injection  20 mg IntraVENous QHS          Objective: Active hospital medications were reviewed    Lab results and neuroradiology studies from the last 24 hours were reviewed. Prior to Admission medications    Medication Sig Start Date End Date Taking? Authorizing Provider   labetalol (NORMODYNE) 100 mg tablet Take 100 mg by mouth every eight (8) hours. 9/5/19  Yes Provider, Historical   NIFEdipine ER (PROCARDIA XL) 30 mg ER tablet Take 30 mg by mouth daily. 9/6/19  Yes Provider, Historical   polyethylene glycol (MIRALAX) 17 gram/dose powder Take 17 g by mouth daily.  9/7/19   Provider, Historical   benzonatate (TESSALON) 100 mg capsule Take 1 Cap by mouth three (3) times daily as needed for Cough. 9/7/19   Provider, Historical   hydrALAZINE (APRESOLINE) 25 mg tablet Take 25 mg by mouth four (4) times daily. Provider, Historical   acetaminophen (TYLENOL) 325 mg tablet Take 325 mg by mouth every four (4) hours as needed for Pain. Provider, Historical   cloNIDine (CATAPRES) 0.3 mg/24 hr 1 Patch by TransDERmal route every seven (7) days. Provider, Historical   simvastatin (ZOCOR) 40 mg tablet Take 40 mg by mouth nightly. Provider, Historical   lactulose (CHRONULAC) 10 gram/15 mL solution Take 45 mL by mouth three (3) times daily as needed (constipation). 3/21/19   Milind Calhoun MD   montelukast (SINGULAIR) 10 mg tablet TAKE ONE TABLET BY MOUTH EVERY NIGHT AT BEDTIME 7/19/17   Provider, Historical   rivastigmine tartrate (EXELON) 1.5 mg capsule TAKE ONE CAPSULE BY MOUTH TWICE DAILY WITH MEALS 12/3/17   Provider, Historical   FERROUS SULFATE (FEOSOL PO) Take 325 mg by mouth two (2) times a day. for anemia      Provider, Historical   alfuzosin SR (UROXATRAL) 10 mg SR tablet Take 10 mg by mouth nightly as needed for Other (prostate). Provider, Historical   fexofenadine (ALLEGRA) 60 mg tablet Take 60 mg by mouth two (2) times a day. Provider, Historical     Patient Vitals for the past 8 hrs:   BP Temp Pulse Resp SpO2   09/17/19 0800 195/72 97.3 °F (36.3 °C) 61 18 97 %   09/17/19 0400 171/82 98.3 °F (36.8 °C) (!) 57 18 98 %   [id number]/15 1901 - 09/17 0700  In: 1664.5 [P.O.:390; I.V.:1274.5]  Out: 4695 [HZRYR:5291]  09/15 1901 - 09/17 0700  In: 1664.5 [P.O.:390; I.V.:1274.5]  Out: 2170 [Urine:3925]RESULTRCNT(24h)Principal Problem:    TIA (transient ischemic attack) (9/15/2019)    Active Problems:    Coronary atherosclerosis of native coronary artery ()      Overview: AS DESCRIBED IN CATH CARDIAC CATH IN 9/07 SHOWS 80% DISTAL LAD AND 40%       PROXIMAL LAD DISEASE . HE IS ON MEDICAL TREATMENT.  ASA AND B-BLOCKER 3/10       RENAL ARTERY DUPLEX : NL RT RENAL ARTERY WITH < 60% STENOSIS OF LEFT RENAL       ARTERY      Type 2 diabetes mellitus with hyperglycemia, without long-term current use of insulin ([facility] [address] (2/3/2009)      Essential hypertension (2/3/2009)        Additional comments:I reviewed the patient's new clinical lab test results. and I reviewed the patients new imaging test results. General Exam  No acute distress, mucous membranes normal color and hydration status    Neurologic Exam    Mental status:  Alert, oriented to person, place, time and circumstance  Language: normal fluency and comprehension  No visual spatial neglect or overt apraxia  Cranial nerves: PERRL, Extraocular movements intact and full, face symmetric to movement, Tongue midline with normal strength, palat symmetric    Motor: strength - moves all 4 extremities symmetric  No pronator drift  Gait: not tested      Assessment:     Leatha Fabian is a 80 y.o. man with hx of mild cognitive impairment on exelon patch, HTN, DM, CAD admitted with transient acute onset left sided weakness with complete resolution. The patient Neuro exam non-focal this am, mild language dysarthria - expected given the hx of MCI.       He is doing very well given his age.  The brain MRI was negative for acute stroke     Plan:     - ok to discharge to SNF and continue his home meds      Signed:  Lázaro Mckeon MD  Adult Neurologist  9/17/2019  10:59 AM

## 2019-09-17 NOTE — PROGRESS NOTES
Problem: Falls - Risk of  Goal: *Absence of Falls  Description  Document Vi Lanier Fall Risk and appropriate interventions in the flowsheet. Outcome: Progressing Towards Goal  Note:   Fall Risk Interventions:  Mobility Interventions: Assess mobility with egress test, Bed/chair exit alarm, OT consult for ADLs, Patient to call before getting OOB, PT Consult for mobility concerns, PT Consult for assist device competence, Strengthening exercises (ROM-active/passive) non skid foot wear    Mentation Interventions: Adequate sleep, hydration, pain control, Door open when patient unattended, More frequent rounding, Reorient patient, Room close to nurse's station    Medication Interventions: Assess postural VS orthostatic hypotension, Evaluate medications/consider consulting pharmacy, Patient to call before getting OOB    Elimination Interventions: Bed/chair exit alarm, Call light in reach, Patient to call for help with toileting needs              Problem: Pressure Injury - Risk of  Goal: *Prevention of pressure injury  Description  Document Feroz Scale and appropriate interventions in the flowsheet.   Outcome: Progressing Towards Goal  Note:   Pressure Injury Interventions:  Sensory Interventions: Assess changes in LOC, Discuss PT/OT consult with provider, Keep linens dry and wrinkle-free, Monitor skin under medical devices, Pressure redistribution bed/mattress (bed type)    Moisture Interventions: Absorbent underpads, Maintain skin hydration (lotion/cream)    Activity Interventions: Increase time out of bed, Pressure redistribution bed/mattress(bed type), PT/OT evaluation    Mobility Interventions: HOB 30 degrees or less, Pressure redistribution bed/mattress (bed type), PT/OT evaluation    Nutrition Interventions: Document food/fluid/supplement intake                     Problem: TIA/CVA Stroke: 0-24 hours  Goal: Activity/Safety  Outcome: Progressing Towards Goal  Goal: Treatments/Interventions/Procedures  Outcome: Progressing Towards Goal     Problem: TIA/CVA Stroke: Day 2 Until Discharge  Goal: Discharge Planning  Outcome: Progressing Towards Goal     Problem: Pain  Goal: *Control of Pain  Outcome: Progressing Towards Goal     Problem: Patient Education: Go to Patient Education Activity  Goal: Patient/Family Education  Outcome: Progressing Towards Goal

## 2019-09-17 NOTE — PROGRESS NOTES
PT orders received, chart reviewed. Pt unable to participate with PT due to:  []  Nausea/vomiting  []  Eating  []  Pain  []  Pt lethargic  []  Off Unit  []  Pt refused  [x]  Other at [phone]: nursing states they are checking BP in soon and then possible d/c. Checked chart [phone], pt's BP at 1615 was 215/88 with d/c being canceled and medication being ordered. Holding PT due to BP out of range for functional mobility. Will f/u later as patient's schedule allows.  Thank you for this referral.  [facility], PT, DPT

## 2019-09-17 NOTE — PROGRESS NOTES
conducted an initial consultation and Spiritual Assessment for Derrek Blue, who is a 7[address] y.o[address]male. Patient's Primary Language is: [place]. According to the patient's EMR Yarsanism Affiliation is: Hector Faust. The reason the Patient came to the hospital is:   Patient Active Problem List    Diagnosis Date Noted    Acute renal failure ([facility] [address] 09/16/2019    TIA (transient ischemic attack) 09/15/2019    Acute pancreatitis 09/02/2019    Stercoral ulcer of rectum 03/16/2019    Internal hemorrhoids 03/16/2019    Diverticulosis 03/16/2019    GI bleed 06/33/2765    Acute embolic stroke ([facility] [address] 19/80/3130    PVC (premature ventricular contraction) 02/23/2019    Wears hearing aid 03/01/2016    Coronary atherosclerosis of native coronary artery     Other and unspecified hyperlipidemia     Malignant neoplasm of prostate ([facility] [address] 09/17/2010    Type 2 diabetes mellitus with hyperglycemia, without long-term current use of insulin ([facility] [address] 02/03/2009    Pure hypercholesterolemia 02/03/2009    Personal history of malignant neoplasm of prostate 02/03/2009    Essential hypertension 02/03/2009    Chronic allergic rhinitis 02/03/2009        The  provided the following Interventions:  Initiated a relationship of care and support. Explored issues of brandee, belief, spirituality and Religion/ritual needs while hospitalized. Listened empathically. Provided chaplaincy education. Provided information about Spiritual Care Services. Offered prayer and assurance of continued prayers on patient's behalf. Chart reviewed. The following outcomes where achieved:  Patient shared limited information about both their medical narrative and spiritual journey/beliefs.  confirmed Patient's Yarsanism Affiliation. Patient processed feeling about current hospitalization. Patient expressed gratitude for 's visit.     Assessment:  Patient does not have any Religion/cultural needs that will affect patient's preferences in health care. There are no spiritual or Holiness issues which require intervention at this time. Plan:  Chaplains will continue to follow and will provide pastoral care on an as needed/requested basis.  recommends bedside caregivers page  on duty if patient shows signs of acute spiritual or emotional distress.     [facility]   [phone]

## 2019-09-17 NOTE — PROGRESS NOTES
Prelim MRI brain:     No acute infarct. Small foci hemosiderin adjacent to right lateral ventricle, right parietal lobes c/w remote hemorrhage. Atrophy. Full report to follow.

## 2019-09-17 NOTE — DISCHARGE SUMMARY
Discharge Summary    Patient: Macy Welsh MRN: [id number]  CSN: [id number]    YOB: 1929  Age: 80 y.o. Sex: male    DOA: 9/15/2019 LOS:  LOS: 2 days   Discharge Date: 9/17/2019     Admission Diagnoses: TIA (transient ischemic attack) [G45.9]    Discharge Diagnoses:    TIA   HTN  DM 2  Dysphagia  Anemia chronic  Chronic constipation  Advanced age  Hx GI bleed secondary to stercoral ulcer      Discharge Condition: Stable    PHYSICAL EXAM  Visit Vitals  /72 (BP 1 Location: Left arm, BP Patient Position: At rest)   Pulse 61   Temp 97.3 °F (36.3 °C)   Resp 18   Ht 5' 10\" (1.778 m)   Wt 84 kg (185 lb 3.3 oz)   SpO2 97%   BMI 26.57 kg/m²       General: In NAD. HEENT: NCAT. Sclerae anicteric. EOMI. Lungs:  Clear, no wheezes. Effort nonlabored. Heart:  RRR. Abdomen: Soft, NTTP. Extremities: Warm, no ischemia or edema. Psych:   Mood normal, no agitation. Neurologic:  Awake and alert. Moves extremities spontaneously. No new focal deficits. Hospital Course:   See admission H&P for full details of HPI. Patient admitted to stroke floor for evaluation of L sided weakness. Symptoms have completely resolved and imaging is negative for acute stroke. BPs have been elevated but all of patient's antihypertensive therapy has yet to be resumed. Recommendation is for antihypertensive regimen to be rsumed as previously prescribed given negative stroke workup. SLP evaluation was obtained this admission and MBS was recommended - results of study documented below. Recommendation for diet going forward is mechanical soft solids with HTL and crushed medications. Patient would benefit from continued speech therapy as outpatient. He has remained stable otherwise and can be discharge to his previous assisted living facility today with outpatient follow up as advised.       Consults:   Neurology    Significant Diagnostic Studies:  MRI:  Formal reading pending at time of transcription, but no evidence for acute CVA per neurology reading. CT head:  IMPRESSION  Impression:      No CT evidence of acute intracranial pathology.     Moderate burden of presumed chronic white matter microvascular ischemic changes. Cerebral volume loss. MBS:  IMPRESSION:     1. Laryngeal penetration of nectar consistency.     2. No sidney penetration or aspiration with other testing consistencies. Please see speech pathologist report for additional details and recommendations. Discharge Medications:     Current Discharge Medication List      START taking these medications    Details   aspirin 81 mg chewable tablet Take 1 Tab by mouth daily. Qty: 15 Tab, Refills: 0         CONTINUE these medications which have CHANGED    Details   cloNIDine (CATAPRES) 0.3 mg/24 hr 1 Patch by TransDERmal route every seven (7) days. Qty: 4 Patch, Refills: 0      acetaminophen (TYLENOL) 325 mg tablet Take 1 Tab by mouth every four (4) hours as needed for Pain. Qty: 12 Tab, Refills: 0      benzonatate (TESSALON) 100 mg capsule Take 1 Cap by mouth three (3) times daily as needed for Cough. Qty: 12 Cap, Refills: 0      alfuzosin SR (UROXATRAL) 10 mg SR tablet Take 1 Tab by mouth nightly as needed for Other (prostate). Qty: 15 Tab, Refills: 0      ferrous sulfate (FEOSOL) 325 mg (65 mg iron) tablet Take 1 Tab by mouth two (2) times a day. for anemia  Qty: 30 Tab, Refills: 0      fexofenadine (ALLEGRA) 60 mg tablet Take 1 Tab by mouth two (2) times a day. Qty: 30 Tab, Refills: 0      labetalol (NORMODYNE) 100 mg tablet Take 1 Tab by mouth every eight (8) hours. Qty: 45 Tab, Refills: 0      montelukast (SINGULAIR) 10 mg tablet Take 1 Tab by mouth daily for 15 days. Qty: 15 Tab, Refills: 0      NIFEdipine ER (PROCARDIA XL) 30 mg ER tablet Take 1 Tab by mouth daily. Qty: 15 Tab, Refills: 0      lactulose (CHRONULAC) 10 gram/15 mL solution Take 45 mL by mouth three (3) times daily as needed (constipation).   Qty: 1 Bottle, Refills: 0      rivastigmine tartrate (EXELON) 1.5 mg capsule Take 1 Cap by mouth two (2) times daily (with meals). Qty: 30 Cap, Refills: 0      polyethylene glycol (MIRALAX) 17 gram/dose powder Take 17 g by mouth daily. Qty: 255 g, Refills: 0      simvastatin (ZOCOR) 40 mg tablet Take 1 Tab by mouth nightly. Qty: 15 Tab, Refills: 0      hydrALAZINE (APRESOLINE) 50 mg tablet Take 1 Tab by mouth three (3) times daily. Qty: 45 Tab, Refills: 0         STOP taking these medications       Ferrous Fumarate 325 mg (106 mg iron) tab Comments:   Reason for Stopping:         SITagliptin (JANUVIA) 100 mg tablet Comments:   Reason for Stopping:         valsartan-hydrochlorothiazide (DIOVAN HCT) 160-12.5 mg per tablet Comments:   Reason for Stopping:                 Activity: As tolerated    Diet: Mechanical soft with HTL and crushed medications. Aspirations precautions with all oral intake. Disposition:  Return to assisted living facility. Recommend continued speech therapy. Follow-up: with PCP, Diann Whitehead MD in 1 week, [facility] neurology as directed. Lyndee Sandifer.  Yola Lopez MD

## 2019-09-17 NOTE — PROGRESS NOTES
[facility] REFERRAL CONTACT NOTE  [id number] [place] [address] for Physical Rehabilitation      RE:  Nando Rosales                Thank you for the opportunity to review this patient's case for admission to [address] [place] [address] for Physical Rehabilitation. Per further review with team,  patient does not meet criteria for admission to [facility] for Physical Rehabilitation.  Documents do not reflect rehab diagnosis.  [address] vs home with home care upon discharge. Again, Thank you for this referral. Should you have any questions please do not hesitate to call. Sincerely,  Jeanna Rowan.  [address] for Physical Rehabilitation  [phone]

## 2019-09-17 NOTE — PROGRESS NOTES
BPs still elevated and out of acceptable range. Additional orders for medication given to nursing staff. Cancel discharge today.

## 2019-09-18 ENCOUNTER — OFFICE VISIT (OUTPATIENT)
Dept: CARDIOLOGY CLINIC | Age: 84
End: 2019-09-18

## 2019-09-18 VITALS
HEART RATE: 63 BPM | TEMPERATURE: 97.5 F | OXYGEN SATURATION: 98 % | BODY MASS INDEX: 26.51 KG/M2 | HEIGHT: 70 IN | SYSTOLIC BLOOD PRESSURE: 141 MMHG | WEIGHT: 185.21 LBS | RESPIRATION RATE: 17 BRPM | DIASTOLIC BLOOD PRESSURE: 65 MMHG

## 2019-09-18 VITALS
BODY MASS INDEX: 26.05 KG/M2 | HEIGHT: 70 IN | HEART RATE: 65 BPM | OXYGEN SATURATION: 98 % | DIASTOLIC BLOOD PRESSURE: 60 MMHG | WEIGHT: 182 LBS | SYSTOLIC BLOOD PRESSURE: 152 MMHG

## 2019-09-18 DIAGNOSIS — G45.9 TIA (TRANSIENT ISCHEMIC ATTACK): ICD-10-CM

## 2019-09-18 DIAGNOSIS — R00.1 BRADYCARDIA: ICD-10-CM

## 2019-09-18 DIAGNOSIS — I10 ESSENTIAL HYPERTENSION: Primary | ICD-10-CM

## 2019-09-18 LAB
GLUCOSE BLD STRIP.AUTO-MCNC: 125 MG/DL (ref 70–110)
GLUCOSE BLD STRIP.AUTO-MCNC: 206 MG/DL (ref 70–110)

## 2019-09-18 PROCEDURE — 74011250636 HC RX REV CODE- 250/636: Performed by: HOSPITALIST

## 2019-09-18 PROCEDURE — 74011250637 HC RX REV CODE- 250/637: Performed by: HOSPITALIST

## 2019-09-18 PROCEDURE — 97530 THERAPEUTIC ACTIVITIES: CPT

## 2019-09-18 PROCEDURE — 74011000250 HC RX REV CODE- 250: Performed by: HOSPITALIST

## 2019-09-18 PROCEDURE — 82962 GLUCOSE BLOOD TEST: CPT

## 2019-09-18 PROCEDURE — 97116 GAIT TRAINING THERAPY: CPT

## 2019-09-18 PROCEDURE — 74011636637 HC RX REV CODE- 636/637: Performed by: FAMILY MEDICINE

## 2019-09-18 PROCEDURE — 74011250637 HC RX REV CODE- 250/637: Performed by: FAMILY MEDICINE

## 2019-09-18 RX ADMIN — ATORVASTATIN CALCIUM 80 MG: 40 TABLET, FILM COATED ORAL at 00:10

## 2019-09-18 RX ADMIN — HYDRALAZINE HYDROCHLORIDE 25 MG: 25 TABLET, FILM COATED ORAL at 08:25

## 2019-09-18 RX ADMIN — HEPARIN SODIUM 5000 UNITS: 5000 INJECTION INTRAVENOUS; SUBCUTANEOUS at 05:53

## 2019-09-18 RX ADMIN — HYDRALAZINE HYDROCHLORIDE 25 MG: 25 TABLET, FILM COATED ORAL at 00:10

## 2019-09-18 RX ADMIN — INSULIN LISPRO 2 UNITS: 100 INJECTION, SOLUTION INTRAVENOUS; SUBCUTANEOUS at 00:13

## 2019-09-18 RX ADMIN — LABETALOL HYDROCHLORIDE 100 MG: 100 TABLET, FILM COATED ORAL at 05:54

## 2019-09-18 RX ADMIN — NIFEDIPINE 30 MG: 30 TABLET, FILM COATED, EXTENDED RELEASE ORAL at 08:25

## 2019-09-18 RX ADMIN — Medication 10 ML: at 05:52

## 2019-09-18 RX ADMIN — HEPARIN SODIUM 5000 UNITS: 5000 INJECTION INTRAVENOUS; SUBCUTANEOUS at 00:11

## 2019-09-18 RX ADMIN — LABETALOL HYDROCHLORIDE 100 MG: 100 TABLET, FILM COATED ORAL at 00:10

## 2019-09-18 RX ADMIN — Medication 10 ML: at 00:11

## 2019-09-18 RX ADMIN — FAMOTIDINE 20 MG: 10 INJECTION INTRAVENOUS at 00:17

## 2019-09-18 RX ADMIN — ASPIRIN 81 MG 81 MG: 81 TABLET ORAL at 08:25

## 2019-09-18 NOTE — PROGRESS NOTES
Called Loa at [facility] and [facility] picked up the phone. CM informed her pt has been discharged and coming to them today and she stated she has to transfer me to the nurses but voice mail for the  came up and CM left a message.     Lindsey Martines, BSN RN  Care Management  Pager: [phone]

## 2019-09-18 NOTE — PROGRESS NOTES
Rhonda Alcaraz    Post hospital TIA, HTN, h/o CAD    HPI    Rhonda Alcaraz is a 80 y.o. referred for HTN and low HRs. I personally obtained and reviewed available records. I see pt was last hospitalized in 3/2019 for a LGIB/ acute blood loss anemia. He has some remote history of CAD but hasn't been seen by a cardiologist in years. I was able to find an echocardiogram report from 2009 with normal LV function 65%, no significant valvular pathology but abnormal diastolic parameters. More recently he was hospitalized [facility] 4/6/19 for acute mental status change: He had an echo that was normal. There was questionable AV block in route by EMS but during hospitalization only sinus lilia documented. Pt himself has dementia, had an acute CVA (embolic suspected) as has known moderate carotid stenosis. Pt was discharged from the hospital today and comes straight to my office. I personally obtained and reviewed his medical records and see that he presented with mental status changes and was found to have a TIA. Overall he was monitored closely and discharge held today as his blood pressures continued to be somewhat uncontrolled. Medications were finally adjusted and he was discharged in stable condition this morning. His blood pressure in my office is much better in my office and he has no complaints- denies CP SOB, palps etc.    Past Medical History:   Diagnosis Date    Arthritis     Cancer ([address]     Coronary atherosclerosis of native coronary artery     AS DESCRIBED IN CATH CARDIAC CATH IN 9/07 SHOWS 80% DISTAL LAD AND 40% PROXIMAL LAD DISEASE . HE IS ON MEDICAL TREATMENT.  ASA AND B-BLOCKER 3/10 RENAL ARTERY DUPLEX : NL RT RENAL ARTERY WITH < 60% STENOSIS OF LEFT RENAL ARTERY    Diabetes ([address]     Glaucoma     Hypertension     Impotence of organic origin     Malignant neoplasm of prostate ([address]     Other and unspecified hyperlipidemia        Past Surgical History:   Procedure Laterality Date    COLONOSCOPY N/A 3/16/2019    COLONOSCOPY w/ polypectomies performed by Renetta Andrews [facility] at [facility]  3/16/2019         COLONOSCOPY,JARROD HADLEY  3/16/2019            Current Outpatient Medications   Medication Sig Dispense Refill    cloNIDine (CATAPRES) 0.3 mg/24 hr 1 Patch by TransDERmal route every seven (7) days. 4 Patch 0    acetaminophen (TYLENOL) 325 mg tablet Take 1 Tab by mouth every four (4) hours as needed for Pain. 12 Tab 0    benzonatate (TESSALON) 100 mg capsule Take 1 Cap by mouth three (3) times daily as needed for Cough. 12 Cap 0    alfuzosin SR (UROXATRAL) 10 mg SR tablet Take 1 Tab by mouth nightly as needed for Other (prostate). 15 Tab 0    ferrous sulfate (FEOSOL) 325 mg (65 mg iron) tablet Take 1 Tab by mouth two (2) times a day. for anemia 30 Tab 0    fexofenadine (ALLEGRA) 60 mg tablet Take 1 Tab by mouth two (2) times a day. 30 Tab 0    labetalol (NORMODYNE) 100 mg tablet Take 1 Tab by mouth every eight (8) hours. 45 Tab 0    montelukast (SINGULAIR) 10 mg tablet Take 1 Tab by mouth daily for 15 days. 15 Tab 0    NIFEdipine ER (PROCARDIA XL) 30 mg ER tablet Take 1 Tab by mouth daily. 15 Tab 0    lactulose (CHRONULAC) 10 gram/15 mL solution Take 45 mL by mouth three (3) times daily as needed (constipation). 1 Bottle 0    rivastigmine tartrate (EXELON) 1.5 mg capsule Take 1 Cap by mouth two (2) times daily (with meals). 30 Cap 0    polyethylene glycol (MIRALAX) 17 gram/dose powder Take 17 g by mouth daily. 255 g 0    simvastatin (ZOCOR) 40 mg tablet Take 1 Tab by mouth nightly. 15 Tab 0    aspirin 81 mg chewable tablet Take 1 Tab by mouth daily. 15 Tab 0    hydrALAZINE (APRESOLINE) 50 mg tablet Take 1 Tab by mouth three (3) times daily.  45 Tab 0       Allergies   Allergen Reactions    Memantine Other (comments)     nightmares       Social History     Socioeconomic History    Marital status:      Spouse name: Not on file    Number of children: Not on file    Years of education: Not on file    Highest education level: Not on file   Occupational History    Not on file   Social Needs    Financial resource strain: Not on file    Food insecurity:     Worry: Not on file     Inability: Not on file    Transportation needs:     Medical: Not on file     Non-medical: Not on file   Tobacco Use    Smoking status: Never Smoker    Smokeless tobacco: Never Used   Substance and Sexual Activity    Alcohol use: No    Drug use: Not on file    Sexual activity: Not on file   Lifestyle    Physical activity:     Days per week: Not on file     Minutes per session: Not on file    Stress: Not on file   Relationships    Social connections:     Talks on phone: Not on file     Gets together: Not on file     Attends Taoist service: Not on file     Active member of club or organization: Not on file     Attends meetings of clubs or organizations: Not on file     Relationship status: Not on file    Intimate partner violence:     Fear of current or ex partner: Not on file     Emotionally abused: Not on file     Physically abused: Not on file     Forced sexual activity: Not on file   Other Topics Concern    Not on file   Social History Narrative    Not on file        FH: n/a    Review of Systems    14 pt Review of Systems is negative unless otherwise mentioned in the HPI.     Wt Readings from Last 3 Encounters:   09/18/19 82.6 kg (182 lb)   09/17/19 84 kg (185 lb 3.3 oz)   08/14/19 89.8 kg (198 lb)     Temp Readings from Last 3 Encounters:   09/18/19 97.5 °F (36.4 °C)   04/18/19 97.4 °F (36.3 °C)   04/18/19 98.2 °F (36.8 °C)     BP Readings from Last 3 Encounters:   09/18/19 152/60   09/18/19 141/65   08/14/19 140/80     Pulse Readings from Last 3 Encounters:   09/18/19 65   09/18/19 63   08/14/19 60     Physical Exam:    Visit Vitals  /60   Pulse 65   Ht 5' 10\" (1.778 m)   Wt 82.6 kg (182 lb)   SpO2 98%   BMI 26.11 kg/m²      Physical Exam   Constitutional: He is oriented to person, place, and time. He appears well-developed and well-nourished. HENT:   Head: Normocephalic and atraumatic. Eyes: Pupils are equal, round, and reactive to light. EOM are normal. No scleral icterus. Neck: No JVD present. Cardiovascular: Normal rate, regular rhythm, normal heart sounds and intact distal pulses. Exam reveals no gallop and no friction rub. No murmur heard. Pulmonary/Chest: Effort normal and breath sounds normal. No respiratory distress. He has no wheezes. He has no rales. He exhibits no tenderness. Abdominal: Soft. Bowel sounds are normal.   Musculoskeletal: He exhibits no edema. Neurological: He is alert and oriented to person, place, and time. Skin: Skin is warm and dry. No rash noted. Psychiatric: He has a normal mood and affect. EKG last: 60 bpm Second degree AVB (Mobitz type II), Wide QRS/ RBBB appearance    Impression and Plan:  Cleve Colindres is a 80 y.o. with:    1.) Sinus bradycardia, with suspected intermittent AVB  2.) Recent TIA/ CVA  3.) Carotid disease  4.) Remote CAD, details unknown  5.) Normal LV function  6.) Dementia/ advanced age    3.) No further changes to medications today as they were just adjusted  2.) RTC NP 2-3 months to ensure current regimen controlling BP  3.) RTC with me 6 months routine follow up    Thank you for allowing me to participate in the care of your patient, please do not hesitate to call with questions or concerns.     Kindest Regards,    Fanny Nuno, DO

## 2019-09-18 NOTE — ROUTINE PROCESS
Bedside and Verbal shift change report given to laura  (oncoming nurse) by Jairo Coulter RN 
(offgoing nurse). Report included the following information SBAR, MAR and Recent Results.

## 2019-09-18 NOTE — DISCHARGE INSTRUCTIONS
Patient Education      Patient armband removed and shredded  MyChart Activation    Thank you for requesting access to Workboard. Please follow the instructions below to securely access and download your online medical record. Workboard allows you to send messages to your doctor, view your test results, renew your prescriptions, schedule appointments, and more. How Do I Sign Up? 1. In your internet browser, go to [email or web address]  2. Click on the First Time User? Click Here link in the Sign In box. You will be redirect to the New Member Sign Up page. 3. Enter your Workboard Access Code exactly as it appears below. You will not need to use this code after youve completed the sign-up process. If you do not sign up before the expiration date, you must request a new code. Workboard Access Code: Activation code not generated  Current Workboard Status: Active (This is the date your Workboard access code will )    4. Enter the last four digits of your Social Security Number (xxxx) and Date of Birth (mm/dd/yyyy) as indicated and click Submit. You will be taken to the next sign-up page. 5. Create a Workboard ID. This will be your Workboard login ID and cannot be changed, so think of one that is secure and easy to remember. 6. Create a Workboard password. You can change your password at any time. 7. Enter your Password Reset Question and Answer. This can be used at a later time if you forget your password. 8. Enter your e-mail address. You will receive e-mail notification when new information is available in [address]. 9. Click Sign Up. You can now view and download portions of your medical record. 10. Click the Download Summary menu link to download a portable copy of your medical information. Additional Information    If you have questions, please visit the Frequently Asked Questions section of the Workboard website at [email or web address] Remember, Workboard is NOT to be used for urgent needs. For medical emergencies, dial 911. DISCHARGE SUMMARY from Nurse    PATIENT INSTRUCTIONS:      What to do at Home:  Recommended activity: Activity as tolerated, with home health    If you experience any of the following symptoms chest pains, shortness of breath, fever, chills, elevated temperature greater than 100.9 F, drooling, excessive nausea or vomiting, please follow up with nearest Emergency room. *  Please give a list of your current medications to your Primary Care Provider. *  Please update this list whenever your medications are discontinued, doses are      changed, or new medications (including over-the-counter products) are added. *  Please carry medication information at all times in case of emergency situations. These are general instructions for a healthy lifestyle:    No smoking/ No tobacco products/ Avoid exposure to second hand smoke  Surgeon General's Warning:  Quitting smoking now greatly reduces serious risk to your health. Obesity, smoking, and sedentary lifestyle greatly increases your risk for illness    A healthy diet, regular physical exercise & weight monitoring are important for maintaining a healthy lifestyle    You may be retaining fluid if you have a history of heart failure or if you experience any of the following symptoms:  Weight gain of 3 pounds or more overnight or 5 pounds in a week, increased swelling in our hands or feet or shortness of breath while lying flat in bed. Please call your doctor as soon as you notice any of these symptoms; do not wait until your next office visit. The discharge information has been reviewed with the patient. The patient verbalized understanding. Discharge medications reviewed with the patient and appropriate educational materials and side effects teaching were provided.   ___________________________________________________________________________________________________________________________________  Transient Ischemic Attack: Care Instructions  Your Care Instructions    A transient ischemic attack (TIA) is when blood flow to a part of your brain is blocked for a short time. A TIA is like a stroke but usually lasts only a few minutes. A TIA does not cause lasting brain damage. Any vision problems, slurred speech, or other symptoms usually go away in 10 to 20 minutes. But they may last for up to 24 hours. TIAs are often warning signs of a stroke. Some people who have a TIA may have a stroke in the future. A stroke can cause symptoms like those of a TIA. But a stroke causes lasting damage to your brain. You can take steps to help prevent a stroke. One thing you can do is get early treatment. If you have other new symptoms, or if your symptoms do not get better, go back to the emergency room or call your doctor right away. Getting treatment right away may prevent long-term brain damage caused by a stroke. The doctor has checked you carefully, but problems can develop later. If you notice any problems or new symptoms, get medical treatment right away. Follow-up care is a key part of your treatment and safety. Be sure to make and go to all appointments, and call your doctor if you are having problems. It's also a good idea to know your test results and keep a list of the medicines you take. How can you care for yourself at home? Medicines    · Be safe with medicines. Take your medicines exactly as prescribed. Call your doctor if you think you are having a problem with your medicine.     · If you take a blood thinner, such as aspirin, be sure you get instructions about how to take your medicine safely. Blood thinners can cause serious bleeding problems.     · Call your doctor if you are not able to take your medicines for any reason.     · Do not take any over-the-counter medicines or herbal products without talking to your doctor first.     · If you take birth control pills or hormone therapy, talk to your doctor.  Ask if these treatments are right for you.    Lifestyle changes    · Do not smoke. If you need help quitting, talk to your doctor about stop-smoking programs and medicines.     · Be active. If your doctor recommends it, get more exercise. Walking is a good choice. Bit by bit, increase the amount you walk every day. Try for at least 30 minutes on most days of the week. You also may want to swim, bike, or do other activities.     · Eat heart-healthy foods. These include fruits, vegetables, high-fiber foods, fish, and foods that are low in sodium, saturated fat, and trans fat.     · Stay at a healthy weight. Lose weight if you need to.     · Limit alcohol to 2 drinks a day for men and 1 drink a day for women.    Staying healthy    · Manage other health problems such as diabetes, high blood pressure, and high cholesterol.     · Get the flu vaccine every year. When should you call for help? Call 911 anytime you think you may need emergency care. For example, call if:    · You have new or worse symptoms of a stroke. These may include:  ? Sudden numbness, tingling, weakness, or loss of movement in your face, arm, or leg, especially on only one side of your body. ? Sudden vision changes. ? Sudden trouble speaking. ? Sudden confusion or trouble understanding simple statements. ? Sudden problems with walking or balance. ? A sudden, severe headache that is different from past headaches. Call 911 even if these symptoms go away in a few minutes.     · You feel like you are having another TIA.    Watch closely for changes in your health, and be sure to contact your doctor if you have any problems. Where can you learn more? Go to http://hayde-roxanne.info/. Enter [phone] in the search box to learn more about \"Transient Ischemic Attack: Care Instructions. \"  Current as of: September 26, 2018  Content Version: 12.1  © 1846-1337 Healthwise, Inc[employer].  Care instructions adapted under license by [employer] (which disclaims liability or warranty for this information). If you have questions about a medical condition or this instruction, always ask your healthcare professional. [address] any warranty or liability for your use of this information.

## 2019-09-18 NOTE — DISCHARGE SUMMARY
ADDENDUM:  Please see previous discharge summary from 9/17/2019. Discharge was held yesterday due to blood pressures not yet being in an acceptable range after antihypertensive therapy was held on admission due to concern for possible CVA. All medications have been resumed and pressures are currently acceptable and patient is medically stable for discharge today with outpatient follow up as advised. Plan discussed with patient's sons (at bedside and via phone) and they are agreeable to proceed as outlined.

## 2019-09-18 NOTE — PROGRESS NOTES
Anglinswathi Blue presents today for   Chief Complaint   Patient presents with    Slow Heart Rate     6-8 WEEK F/U   [facility] Follow Up     TIA 9/15/19 - 9/18/19       Derrek Blue preferred language for health care discussion is english/other. Is someone accompanying this pt? yes    Is the patient using any DME equipment during [address]? no    Depression Screening:  3 most recent PHQ Screens 9/16/2019   Little interest or pleasure in doing things Not at all   Feeling down, depressed, irritable, or hopeless Not at all   Total Score PHQ 2 0       Learning Assessment:  Learning Assessment 8/14/2019   PRIMARY LEARNER Patient   PRIMARY LANGUAGE ENGLISH   LEARNER PREFERENCE PRIMARY DEMONSTRATION   ANSWERED BY Patient   RELATIONSHIP SELF       Abuse Screening:  No flowsheet data found. Fall Risk  Fall Risk Assessment, last 12 mths 9/18/2019   Able to walk? Yes   Fall in past 12 months? No   Number of falls in past 12 months -       Pt currently taking Anticoagulant therapy? no    Coordination of Care:  1. Have you been to the ER, urgent care clinic since your last visit? Hospitalized since your last visit? yes    2. Have you seen or consulted any other health care providers outside of the [address] [place] [address] since your last visit? Include any pap smears or colon screening.  no

## 2019-09-18 NOTE — PROGRESS NOTES
Problem: Mobility Impaired (Adult and Pediatric)  Goal: *Acute Goals and Plan of Care (Insert Text)  Description  Physical Therapy Goals  Initiated 9/16/2019 and to be accomplished within 7 day(s)  1. Patient will move from supine to sit and sit to supine  in bed with modified independence. 2.  Patient will transfer from bed to chair and chair to bed with modified independence using the least restrictive device. 3.  Patient will perform sit to stand with modified independence. 4.  Patient will ambulate with modified independence for 250 feet with the least restrictive device. To prepare pt for mobility at [facility]    PLOF:  Pt lives in an [facility] and ambulates to all meals and activities with a Rollator walker   Outcome: Progressing Towards Goal   PHYSICAL THERAPY TREATMENT    Patient: Melissa Barnett (85 y.o. male)  Date: 9/18/2019  Diagnosis: TIA (transient ischemic attack) [G45.9] TIA (transient ischemic attack)       Precautions: Fall      ASSESSMENT:  Pt found supine HOB elevated willing to participate w/ therapy. Pt voiced no discomfort or pain at rest, needing to perform BM. In supine, BP taken at 170/71 voicing no abnormal symptoms. Pt mobilized well to EOB req cueing for sequencing w/ good core control. Pt sat for approx 2 min at EOB retaking BP at 158/66. Noted that pt's bed soiled , thus pt amb from bed to bathroom displaying good balance and stability t/o w/ use of RW. Pt performed BM w/ linen change, nurse notified of quality, and returned to standing w/ ability to perform self pericare. Pt displayed good unsupported balance while wiping and returned to room to recliner post washing hands. Pt provided w/ further education and left in room in recliner w/ son a side. As pt displays good balance and stability, pt is appropriate to receive HH at [facility]. Nurse notified. Pt instructed to remain in chair for safety and call for assistance prior to mobilizing, pt voiced understanding.    Progression toward goals: good   ? Improving appropriately and progressing toward goals  ? Improving slowly and progressing toward goals  ? Not making progress toward goals and plan of care will be adjusted     PLAN:  Patient continues to benefit from skilled intervention to address the above impairments. Continue treatment per established plan of care. Discharge Recommendations:  Home Health  Further Equipment Recommendations for Discharge:  rolling walker     SUBJECTIVE:   Patient stated I get up, go to the nurse's station to check my vitals,and then walk to the cafeteria to get my breakfast.    OBJECTIVE DATA SUMMARY:   Critical Behavior:  Neurologic State: Alert  Orientation Level: Oriented to person, Oriented to place, Oriented to time  Cognition: Follows commands  Safety/Judgement: Awareness of environment, Fall prevention  Functional Mobility Training:  Bed Mobility:  Supine to Sit: Supervision  Transfers:  Sit to Stand: Supervision  Stand to Sit: Supervision  Balance:  Sitting: Intact  Standing: Intact  Ambulation/Gait Training:  Distance (ft): 25 Feet (ft)  Assistive Device: Walker, rolling  Ambulation - Level of Assistance: Stand-by assistance  Gait Abnormalities: Decreased step clearance  Base of Support: Center of gravity altered  Speed/Marlen: Pace decreased (<100 feet/min)        Pain:  Pain level pre-treatment: 0/10  Pain level post-treatment: 0/10       Activity Tolerance:   Good   Please refer to the flowsheet for vital signs taken during this treatment. After treatment:   ? Patient left in no apparent distress sitting up in chair  ? Patient left in no apparent distress in bed  ? Call bell left within reach  ? Nursing notified  ? Caregiver present  ? Bed alarm activated  ? SCDs applied      COMMUNICATION/EDUCATION:   ?         Role of Physical Therapy in the acute care setting. ?         Fall prevention education was provided and the patient/caregiver indicated understanding.   ? Patient/family have participated as able in working toward goals and plan of care. ?         Patient/family agree to work toward stated goals and plan of care. ?         Patient understands intent and goals of therapy, but is neutral about his/her participation. ? Patient is unable to participate in stated goals/plan of care: ongoing with therapy staff.   ?         Other:        Radha Gann, PTA   Time Calculation: 28 mins

## 2019-10-23 ENCOUNTER — OFFICE VISIT (OUTPATIENT)
Dept: CARDIOLOGY CLINIC | Age: 84
End: 2019-10-23

## 2019-10-23 VITALS
BODY MASS INDEX: 27.2 KG/M2 | HEIGHT: 70 IN | SYSTOLIC BLOOD PRESSURE: 132 MMHG | DIASTOLIC BLOOD PRESSURE: 76 MMHG | WEIGHT: 190 LBS | OXYGEN SATURATION: 98 % | HEART RATE: 65 BPM

## 2019-10-23 DIAGNOSIS — I10 ESSENTIAL HYPERTENSION: ICD-10-CM

## 2019-10-23 DIAGNOSIS — G45.9 TIA (TRANSIENT ISCHEMIC ATTACK): ICD-10-CM

## 2019-10-23 DIAGNOSIS — R00.1 BRADYCARDIA: Primary | ICD-10-CM

## 2019-10-23 NOTE — PROGRESS NOTES
Noemí Medina    Post hospital TIA, HTN, h/o CAD    HPI    Noemí Medina is a 80 y.o. referred for HTN and low HRs. I personally obtained and reviewed available records. I see pt was last hospitalized in 3/2019 for a LGIB/ acute blood loss anemia. He has some remote history of CAD but hasn't been seen by a cardiologist in years. I was able to find an echocardiogram report from 2009 with normal LV function 65%, no significant valvular pathology but abnormal diastolic parameters. More recently he was hospitalized [facility] 4/6/19 for acute mental status change: He had an echo that was normal. There was questionable AV block in route by EMS but during hospitalization only sinus lilia documented. Pt himself has dementia, had an acute CVA (embolic suspected) as has known moderate carotid stenosis. Pt was discharged from the hospital  and came straight to my office last time. I personally obtained and reviewed his medical records and see that he presented with mental status changes and was found to have a TIA. Overall he was monitored closely and discharge held today as his blood pressures continued to be somewhat uncontrolled. Medications were finally adjusted and he was discharged in stable condition this morning. His blood pressure in my office is much better in my office and he has no complaints- denies CP SOB, palps etc.    I had him come for close follow up to ensure his BP and HR were stable on his current regimen. - They have been and again he has no complaints today- except says he coughs and has to eat soft food or he \"has a problem. \"    Past Medical History:   Diagnosis Date    Arthritis     [facility] ([facility] [address]     Coronary atherosclerosis of native coronary artery     AS DESCRIBED IN CATH CARDIAC CATH IN 9/07 SHOWS 80% DISTAL LAD AND 40% PROXIMAL LAD DISEASE . HE IS ON MEDICAL TREATMENT.  ASA AND B-BLOCKER 3/10 RENAL ARTERY DUPLEX : NL RT RENAL ARTERY WITH < 60% STENOSIS OF LEFT RENAL ARTERY    Diabetes ([facility] [address]     Glaucoma     Hypertension     Impotence of organic origin     Malignant neoplasm of prostate ([facility] [address]     Other and unspecified hyperlipidemia        Past Surgical History:   Procedure Laterality Date    COLONOSCOPY N/A 3/16/2019    COLONOSCOPY w/ polypectomies performed by Elva Vazquez [facility] at [facility]  3/16/2019         COLONOSCOPY,MYRON HERNANDEZJAVIER,JARROD  3/16/2019            Current Outpatient Medications   Medication Sig Dispense Refill    cloNIDine (CATAPRES) 0.3 mg/24 hr 1 Patch by TransDERmal route every seven (7) days. 4 Patch 0    acetaminophen (TYLENOL) 325 mg tablet Take 1 Tab by mouth every four (4) hours as needed for Pain. 12 Tab 0    benzonatate (TESSALON) 100 mg capsule Take 1 Cap by mouth three (3) times daily as needed for Cough. 12 Cap 0    alfuzosin SR (UROXATRAL) 10 mg SR tablet Take 1 Tab by mouth nightly as needed for Other (prostate). 15 Tab 0    ferrous sulfate (FEOSOL) 325 mg (65 mg iron) tablet Take 1 Tab by mouth two (2) times a day. for anemia 30 Tab 0    fexofenadine (ALLEGRA) 60 mg tablet Take 1 Tab by mouth two (2) times a day. 30 Tab 0    labetalol (NORMODYNE) 100 mg tablet Take 1 Tab by mouth every eight (8) hours. 45 Tab 0    NIFEdipine ER (PROCARDIA XL) 30 mg ER tablet Take 1 Tab by mouth daily. 15 Tab 0    lactulose (CHRONULAC) 10 gram/15 mL solution Take 45 mL by mouth three (3) times daily as needed (constipation). 1 Bottle 0    rivastigmine tartrate (EXELON) 1.5 mg capsule Take 1 Cap by mouth two (2) times daily (with meals). 30 Cap 0    polyethylene glycol (MIRALAX) 17 gram/dose powder Take 17 g by mouth daily. 255 g 0    simvastatin (ZOCOR) 40 mg tablet Take 1 Tab by mouth nightly. 15 Tab 0    aspirin 81 mg chewable tablet Take 1 Tab by mouth daily. 15 Tab 0    hydrALAZINE (APRESOLINE) 50 mg tablet Take 1 Tab by mouth three (3) times daily.  45 Tab 0       Allergies   Allergen Reactions    Memantine Other (comments)     nightmares       Social History     Socioeconomic History    Marital status:      Spouse name: Not on file    Number of children: Not on file    Years of education: Not on file    Highest education level: Not on file   Occupational History    Not on file   Social Needs    Financial resource strain: Not on file    Food insecurity:     Worry: Not on file     Inability: Not on file    Transportation needs:     Medical: Not on file     Non-medical: Not on file   Tobacco Use    Smoking status: Never Smoker    Smokeless tobacco: Never Used   Substance and Sexual Activity    Alcohol use: No    Drug use: Not on file    Sexual activity: Not on file   Lifestyle    Physical activity:     Days per week: Not on file     Minutes per session: Not on file    Stress: Not on file   Relationships    Social connections:     Talks on phone: Not on file     Gets together: Not on file     Attends [employer] service: Not on file     Active member of club or organization: Not on file     Attends meetings of clubs or organizations: Not on file     Relationship status: Not on file    Intimate partner violence:     Fear of current or ex partner: Not on file     Emotionally abused: Not on file     Physically abused: Not on file     Forced sexual activity: Not on file   Other Topics Concern    Not on file   Social History Narrative    Not on file        FH: n/a    Review of Systems    14 pt Review of Systems is negative unless otherwise mentioned in the HPI.     Wt Readings from Last 3 Encounters:   10/15/19 82.6 kg (182 lb)   09/18/19 82.6 kg (182 lb)   09/17/19 84 kg (185 lb 3.3 oz)     Temp Readings from Last 3 Encounters:   09/18/19 97.5 °F (36.4 °C)   04/18/19 97.4 °F (36.3 °C)   04/18/19 98.2 °F (36.8 °C)     BP Readings from Last 3 Encounters:   10/15/19 134/70   09/18/19 152/60   09/18/19 141/65     Pulse Readings from Last 3 Encounters:   09/18/19 65   09/18/19 63   08/14/19 60 Physical Exam:    There were no vitals taken for this visit. Physical Exam   Constitutional: He is oriented to person, place, and time. He appears well-developed and well-nourished. HENT:   Head: Normocephalic and atraumatic. Eyes: Pupils are equal, round, and reactive to light. EOM are normal. No scleral icterus. Neck: No JVD present. Cardiovascular: Normal rate, regular rhythm, normal heart sounds and intact distal pulses. Exam reveals no gallop and no friction rub. No murmur heard. Pulmonary/Chest: Effort normal and breath sounds normal. No respiratory distress. He has no wheezes. He has no rales. He exhibits no tenderness. Abdominal: Soft. Bowel sounds are normal.   Musculoskeletal: He exhibits no edema. Neurological: He is alert and oriented to person, place, and time. Skin: Skin is warm and dry. No rash noted. Psychiatric: He has a normal mood and affect. EKG last: 60 bpm Second degree AVB (Mobitz type II), Wide QRS/ RBBB appearance    Impression and Plan:  Sandra Perry is a 80 y.o. with:    1.) Sinus bradycardia, with suspected intermittent AVB  2.) Recent TIA/ CVA  3.) Carotid disease  4.) Remote CAD, details unknown  5.) Normal LV function  6.) Dementia/ advanced age  9.) Dysphagia/ Coughing- known    1.) No further changes to medications today as they were just adjusted and doing well/ HR stable/ asymptomatic  2.) RTC 6 months routine follow up, after that can be yearly  3.) Kindly defer coughing/ possible GERD to primary team- asked pt and family to discuss with PCP    Thank you for allowing me to participate in the care of your patient, please do not hesitate to call with questions or concerns.     Kindest Regards,    Juana Blanca, DO

## 2020-06-17 ENCOUNTER — OFFICE VISIT (OUTPATIENT)
Dept: CARDIOLOGY CLINIC | Age: 85
End: 2020-06-17

## 2020-06-17 VITALS
HEIGHT: 70 IN | WEIGHT: 180 LBS | SYSTOLIC BLOOD PRESSURE: 140 MMHG | OXYGEN SATURATION: 97 % | DIASTOLIC BLOOD PRESSURE: 76 MMHG | BODY MASS INDEX: 25.77 KG/M2 | HEART RATE: 69 BPM

## 2020-06-17 DIAGNOSIS — I10 ESSENTIAL HYPERTENSION: ICD-10-CM

## 2020-06-17 DIAGNOSIS — G45.9 TIA (TRANSIENT ISCHEMIC ATTACK): ICD-10-CM

## 2020-06-17 DIAGNOSIS — R00.1 BRADYCARDIA: Primary | ICD-10-CM

## 2020-06-17 NOTE — PROGRESS NOTES
Tea Roman    Post hospital TIA, HTN, h/o CAD    HPI    Tea Roman is a 80 y.o. referred for HTN and low HRs. I personally obtained and reviewed available records. I see pt was last hospitalized in 3/2019 for a LGIB/ acute blood loss anemia. He has some remote history of CAD but hasn't been seen by a cardiologist in years. I was able to find an echocardiogram report from 2009 with normal LV function 65%, no significant valvular pathology but abnormal diastolic parameters. More recently he was hospitalized [facility] 4/6/19 for acute mental status change: He had an echo that was normal. There was questionable AV block in route by EMS but during hospitalization only sinus lilia documented. Pt himself has dementia, had an acute CVA (embolic suspected) as has known moderate carotid stenosis. Pt was discharged from the hospital  and came straight to my office last time. I personally obtained and reviewed his medical records and see that he presented with mental status changes and was found to have a TIA. Overall he was monitored closely and discharge held today as his blood pressures continued to be somewhat uncontrolled. Medications were finally adjusted and he was discharged in stable condition this morning. His blood pressure in my office is much better in my office and he has no complaints- denies CP SOB, palps etc.    I had him come for close follow up to ensure his BP and HR were stable on his current regimen. - They have been and again he has no complaints today- except says he coughs and has to eat soft food or he \"has a problem. \"    Doing well, BP better controlled since last ER visit 12/2019    Past Medical History:   Diagnosis Date    Arthritis     Cancer [facility])     Coronary atherosclerosis of native coronary artery     AS DESCRIBED IN CATH CARDIAC CATH IN 9/07 SHOWS 80% DISTAL LAD AND 40% PROXIMAL LAD DISEASE . HE IS ON MEDICAL TREATMENT.  ASA AND B-BLOCKER 3/10 RENAL ARTERY DUPLEX : NL RT RENAL ARTERY WITH < 60% STENOSIS OF LEFT RENAL ARTERY    Diabetes ([facility] [address]     Glaucoma     Hypertension     Impotence of organic origin     Malignant neoplasm of prostate ([facility] [address]     Other and unspecified hyperlipidemia        Past Surgical History:   Procedure Laterality Date    COLONOSCOPY N/A 3/16/2019    COLONOSCOPY w/ polypectomies performed by Gifty Drummond MD at [address] [place] [address]  3/16/2019         COLONOSCOPY,MYRON SCOTT,JARROD  3/16/2019            Current Outpatient Medications   Medication Sig Dispense Refill    cloNIDine (CATAPRES) 0.3 mg/24 hr 1 Patch by TransDERmal route every seven (7) days. 4 Patch 0    acetaminophen (TYLENOL) 325 mg tablet Take 1 Tab by mouth every four (4) hours as needed for Pain. 12 Tab 0    benzonatate (TESSALON) 100 mg capsule Take 1 Cap by mouth three (3) times daily as needed for Cough. 12 Cap 0    alfuzosin SR (UROXATRAL) 10 mg SR tablet Take 1 Tab by mouth nightly as needed for Other (prostate). 15 Tab 0    ferrous sulfate (FEOSOL) 325 mg (65 mg iron) tablet Take 1 Tab by mouth two (2) times a day. for anemia 30 Tab 0    fexofenadine (ALLEGRA) 60 mg tablet Take 1 Tab by mouth two (2) times a day. 30 Tab 0    labetalol (NORMODYNE) 100 mg tablet Take 1 Tab by mouth every eight (8) hours. 45 Tab 0    NIFEdipine ER (PROCARDIA XL) 30 mg ER tablet Take 1 Tab by mouth daily. 15 Tab 0    lactulose (CHRONULAC) 10 gram/15 mL solution Take 45 mL by mouth three (3) times daily as needed (constipation). 1 Bottle 0    rivastigmine tartrate (EXELON) 1.5 mg capsule Take 1 Cap by mouth two (2) times daily (with meals). 30 Cap 0    polyethylene glycol (MIRALAX) 17 gram/dose powder Take 17 g by mouth daily. 255 g 0    simvastatin (ZOCOR) 40 mg tablet Take 1 Tab by mouth nightly. 15 Tab 0    aspirin 81 mg chewable tablet Take 1 Tab by mouth daily.  15 Tab 0    hydrALAZINE (APRESOLINE) 50 mg tablet Take 1 Tab by mouth three (3) times daily. 45 Tab 0       Allergies   Allergen Reactions    Memantine Other (comments)     nightmares       Social History     Socioeconomic History    Marital status:      Spouse name: Not on file    Number of children: Not on file    Years of education: Not on file    Highest education level: Not on file   Occupational History    Not on file   Social Needs    Financial resource strain: Not on file    Food insecurity     Worry: Not on file     Inability: Not on file    Transportation needs     Medical: Not on file     Non-medical: Not on file   Tobacco Use    Smoking status: Never Smoker    Smokeless tobacco: Never Used   Substance and Sexual Activity    Alcohol use: No    Drug use: Not on file    Sexual activity: Not on file   Lifestyle    Physical activity     Days per week: Not on file     Minutes per session: Not on file    Stress: Not on file   Relationships    Social connections     Talks on phone: Not on file     Gets together: Not on file     Attends [employer] service: Not on file     Active member of club or organization: Not on file     Attends meetings of clubs or organizations: Not on file     Relationship status: Not on file    Intimate partner violence     Fear of current or ex partner: Not on file     Emotionally abused: Not on file     Physically abused: Not on file     Forced sexual activity: Not on file   Other Topics Concern    Not on file   Social History Narrative    Not on file        FH: n/a    Review of Systems    14 pt Review of Systems is negative unless otherwise mentioned in the HPI.     Wt Readings from Last 3 Encounters:   06/17/20 81.6 kg (180 lb)   10/23/19 86.2 kg (190 lb)   10/15/19 82.6 kg (182 lb)     Temp Readings from Last 3 Encounters:   09/18/19 97.5 °F (36.4 °C)   04/18/19 97.4 °F (36.3 °C)   04/18/19 98.2 °F (36.8 °C)     BP Readings from Last 3 Encounters:   06/17/20 140/76   10/23/19 132/76   10/15/19 134/70     Pulse Readings from Last 3 Encounters:   06/17/20 69   10/23/19 65   09/18/19 65     Physical Exam:    Visit Vitals  /76 (BP 1 Location: Left arm, BP Patient Position: Sitting)   Pulse 69   Ht 5' 10\" (1.778 m)   Wt 81.6 kg (180 lb)   SpO2 97%   BMI 25.83 kg/m²      Physical Exam  Constitutional:       Appearance: He is well-developed. HENT:      Head: Normocephalic and atraumatic. Eyes:      General: No scleral icterus. Pupils: Pupils are equal, round, and reactive to light. Neck:      Vascular: No JVD. Cardiovascular:      Rate and Rhythm: Normal rate and regular rhythm. Heart sounds: Normal heart sounds. No murmur. No friction rub. No gallop. Pulmonary:      Effort: Pulmonary effort is normal. No respiratory distress. Breath sounds: Normal breath sounds. No wheezing or rales. Chest:      Chest wall: No tenderness. Abdominal:      General: Bowel sounds are normal.      Palpations: Abdomen is soft. Skin:     General: Skin is warm and dry. Findings: No rash. Neurological:      Mental Status: He is alert and oriented to person, place, and time. EKG today: 60 bpm Second degree AVB (Mobitz type II), Wide QRS/ RBBB appearance    Impression and Plan:  Taylor Alonzo is a 80 y.o. with:    1.) Sinus bradycardia, with suspected intermittent AVB  2.) Recent TIA/ CVA  3.) Carotid disease  4.) Remote CAD, details unknown  5.) Normal LV function  6.) Dementia/ advanced age  9.) RBBB    1.) No further changes to medications today as they were just adjusted and doing well/ HR stable/ asymptomatic  2.) RTC 6 months routine follow up, after that can be yearly    Does have some intermittent conduction system disease, trying to avoid PPM  Doesn't seem necessary regardless at this point  BP stable    Thank you for allowing me to participate in the care of your patient, please do not hesitate to call with questions or concerns.     Kindest Regards,    Farzana Mathew, DO

## 2020-06-17 NOTE — PATIENT INSTRUCTIONS
Doing well, no changes made to meds today See you back yearly routine follow up Call me sooner if questions/ problems

## 2020-06-17 NOTE — PROGRESS NOTES
Viviana Medeiros presents today for   Chief Complaint   Patient presents with    Slow Heart Rate     6 month follow up - no cardiac complaints        Viviana Medeiros preferred language for health care discussion is english/other. Is someone accompanying this pt? no    Is the patient using any DME equipment during [address]? no    Depression Screening:  3 most recent PHQ Screens 6/17/2020   Little interest or pleasure in doing things Not at all   Feeling down, depressed, irritable, or hopeless Not at all   Total Score PHQ 2 0       Learning Assessment:  Learning Assessment 8/14/2019   PRIMARY LEARNER Patient   PRIMARY LANGUAGE ENGLISH   LEARNER PREFERENCE PRIMARY DEMONSTRATION   ANSWERED BY Patient   RELATIONSHIP SELF       Abuse Screening:  Abuse Screening Questionnaire 6/17/2020   Do you ever feel afraid of your partner? N   Are you in a relationship with someone who physically or mentally threatens you? N   Is it safe for you to go home? Y       Fall Risk  Fall Risk Assessment, last 12 mths 6/17/2020   Able to walk? Yes   Fall in past 12 months? No   Number of falls in past 12 months -       Pt currently taking Anticoagulant therapy? ASA 81mg every day     Coordination of Care:  1. Have you been to the ER, urgent care clinic since your last visit? Hospitalized since your last visit? 12/5/19 for HTN at [facility]    2. Have you seen or consulted any other health care providers outside of the [address] [place] [address] since your last visit? Include any pap smears or colon screening.  no

## 2020-11-10 ENCOUNTER — HOSPITAL ENCOUNTER (OUTPATIENT)
Dept: LAB | Age: 85
Discharge: HOME | End: 2020-11-10

## 2020-11-10 LAB
ALBUMIN SERPL-MCNC: 3.5 G/DL (ref 3.4–5)
ALBUMIN/GLOB SERPL: 1 {RATIO} (ref 0.8–1.7)
ALP SERPL-CCNC: 96 U/L (ref 45–117)
ALT SERPL-CCNC: 22 U/L (ref 16–61)
ANION GAP SERPL CALC-SCNC: 10 MMOL/L (ref 3–18)
AST SERPL-CCNC: 20 U/L (ref 10–38)
BASOPHILS # BLD: 0.1 K/UL (ref 0–0.1)
BASOPHILS NFR BLD: 1 % (ref 0–2)
BILIRUB SERPL-MCNC: 0.3 MG/DL (ref 0.2–1)
BNP SERPL-MCNC: 264 PG/ML (ref 0–1800)
BUN SERPL-MCNC: 22 MG/DL (ref 7–18)
BUN/CREAT SERPL: 18 (ref 12–20)
CALCIUM SERPL-MCNC: 9.2 MG/DL (ref 8.5–10.1)
CHLORIDE SERPL-SCNC: 107 MMOL/L (ref 100–111)
CO2 SERPL-SCNC: 26 MMOL/L (ref 21–32)
CREAT SERPL-MCNC: 1.22 MG/DL (ref 0.6–1.3)
DIFFERENTIAL METHOD BLD: ABNORMAL
EOSINOPHIL # BLD: 0.4 K/UL (ref 0–0.4)
EOSINOPHIL NFR BLD: 5 % (ref 0–5)
ERYTHROCYTE [DISTWIDTH] IN BLOOD BY AUTOMATED COUNT: 12.7 % (ref 11.6–14.5)
FAX TO INFO,FAXT: NORMAL
FAX TO NUMBER,FAXN: NORMAL
GLOBULIN SER CALC-MCNC: 3.6 G/DL (ref 2–4)
GLUCOSE SERPL-MCNC: 229 MG/DL (ref 74–99)
HCT VFR BLD AUTO: 38.6 % (ref 36–48)
HGB BLD-MCNC: 11.8 G/DL (ref 13–16)
LYMPHOCYTES # BLD: 2.1 K/UL (ref 0.9–3.6)
LYMPHOCYTES NFR BLD: 27 % (ref 21–52)
MCH RBC QN AUTO: 29.8 PG (ref 24–34)
MCHC RBC AUTO-ENTMCNC: 30.6 G/DL (ref 31–37)
MCV RBC AUTO: 97.5 FL (ref 74–97)
MONOCYTES # BLD: 0.8 K/UL (ref 0.05–1.2)
MONOCYTES NFR BLD: 10 % (ref 3–10)
NEUTS SEG # BLD: 4.4 K/UL (ref 1.8–8)
NEUTS SEG NFR BLD: 57 % (ref 40–73)
PLATELET # BLD AUTO: 201 K/UL (ref 135–420)
PMV BLD AUTO: 11.3 FL (ref 9.2–11.8)
POTASSIUM SERPL-SCNC: 3.9 MMOL/L (ref 3.5–5.5)
PROT SERPL-MCNC: 7.1 G/DL (ref 6.4–8.2)
RBC # BLD AUTO: 3.96 M/UL (ref 4.7–5.5)
SODIUM SERPL-SCNC: 143 MMOL/L (ref 136–145)
WBC # BLD AUTO: 7.6 K/UL (ref 4.6–13.2)

## 2020-11-10 PROCEDURE — 85025 COMPLETE CBC W/AUTO DIFF WBC: CPT

## 2020-11-10 PROCEDURE — 83880 ASSAY OF NATRIURETIC PEPTIDE: CPT

## 2020-11-10 PROCEDURE — 80053 COMPREHEN METABOLIC PANEL: CPT

## 2020-12-16 ENCOUNTER — OFFICE VISIT (OUTPATIENT)
Dept: CARDIOLOGY CLINIC | Age: 85
End: 2020-12-16
Payer: MEDICARE

## 2020-12-16 VITALS
HEART RATE: 68 BPM | OXYGEN SATURATION: 97 % | WEIGHT: 184 LBS | HEIGHT: 70 IN | BODY MASS INDEX: 26.34 KG/M2 | SYSTOLIC BLOOD PRESSURE: 180 MMHG | DIASTOLIC BLOOD PRESSURE: 77 MMHG

## 2020-12-16 DIAGNOSIS — I45.2 RBBB (RIGHT BUNDLE BRANCH BLOCK WITH LEFT ANTERIOR FASCICULAR BLOCK): ICD-10-CM

## 2020-12-16 DIAGNOSIS — R00.1 BRADYCARDIA: Primary | ICD-10-CM

## 2020-12-16 PROCEDURE — G8419 CALC BMI OUT NRM PARAM NOF/U: HCPCS | Performed by: INTERNAL MEDICINE

## 2020-12-16 PROCEDURE — G8536 NO DOC ELDER MAL SCRN: HCPCS | Performed by: INTERNAL MEDICINE

## 2020-12-16 PROCEDURE — G8427 DOCREV CUR MEDS BY ELIG CLIN: HCPCS | Performed by: INTERNAL MEDICINE

## 2020-12-16 PROCEDURE — 99214 OFFICE O/P EST MOD 30 MIN: CPT | Performed by: INTERNAL MEDICINE

## 2020-12-16 PROCEDURE — 1101F PT FALLS ASSESS-DOCD LE1/YR: CPT | Performed by: INTERNAL MEDICINE

## 2020-12-16 PROCEDURE — G8510 SCR DEP NEG, NO PLAN REQD: HCPCS | Performed by: INTERNAL MEDICINE

## 2020-12-16 RX ORDER — VALSARTAN 160 MG/1
TABLET ORAL DAILY
COMMUNITY

## 2020-12-16 RX ORDER — METFORMIN HYDROCHLORIDE 500 MG/1
TABLET ORAL 2 TIMES DAILY WITH MEALS
COMMUNITY

## 2020-12-16 NOTE — PROGRESS NOTES
Ansley Romero    Post hospital TIA, HTN, h/o CAD    HPI    Ansley Romero is a 80 y.o. referred for HTN and low HRs. I personally obtained and reviewed available records. I see pt was last hospitalized in 3/2019 for a LGIB/ acute blood loss anemia. He has some remote history of CAD but hasn't been seen by a cardiologist in years. I was able to find an echocardiogram report from 2009 with normal LV function 65%, no significant valvular pathology but abnormal diastolic parameters. More recently he was hospitalized [facility] 4/6/19 for acute mental status change: He had an echo that was normal. There was questionable AV block in route by EMS but during hospitalization only sinus lilia documented. Pt himself has dementia, had an acute CVA (embolic suspected) as has known moderate carotid stenosis. Pt was discharged from the hospital  and came straight to my office last time. I personally obtained and reviewed his medical records and see that he presented with mental status changes and was found to have a TIA. Overall he was monitored closely and discharge held today as his blood pressures continued to be somewhat uncontrolled. Medications were finally adjusted and he was discharged in stable condition this morning. His blood pressure in my office is much better in my office and he has no complaints- denies CP SOB, palps etc.    They have been and again he has no complaints today- except says he coughs and has to eat soft food or he \"has a problem. \" His BP is elevated but says it was 130s this morning at his facility. Doing well, BP better controlled since last ER visit 12/2019    Past Medical History:   Diagnosis Date    Arthritis     Cancer [facility])     Coronary atherosclerosis of native coronary artery     AS DESCRIBED IN CATH CARDIAC CATH IN 9/07 SHOWS 80% DISTAL LAD AND 40% PROXIMAL LAD DISEASE . HE IS ON MEDICAL TREATMENT.  ASA AND B-BLOCKER 3/10 RENAL ARTERY DUPLEX : NL RT RENAL ARTERY WITH < 60% STENOSIS OF LEFT RENAL ARTERY    [facility] [address]     Glaucoma     Hypertension     Impotence of organic origin     Malignant neoplasm of prostate ([facility]     Other and unspecified hyperlipidemia        Past Surgical History:   Procedure Laterality Date    COLONOSCOPY N/A 3/16/2019    COLONOSCOPY w/ polypectomies performed by Marty Mike MD at [address]  3/16/2019         COLONOSCOPY,MYRON SCOTT,SNARE  3/16/2019            Current Outpatient Medications   Medication Sig Dispense Refill    metFORMIN (GLUCOPHAGE) 500 mg tablet Take  by mouth two (2) times daily (with meals).  valsartan (DIOVAN) 160 mg tablet Take  by mouth daily.  cloNIDine (CATAPRES) 0.3 mg/24 hr 1 Patch by TransDERmal route every seven (7) days. 4 Patch 0    acetaminophen (TYLENOL) 325 mg tablet Take 1 Tab by mouth every four (4) hours as needed for Pain. 12 Tab 0    benzonatate (TESSALON) 100 mg capsule Take 1 Cap by mouth three (3) times daily as needed for Cough. 12 Cap 0    alfuzosin SR (UROXATRAL) 10 mg SR tablet Take 1 Tab by mouth nightly as needed for Other (prostate). 15 Tab 0    ferrous sulfate (FEOSOL) 325 mg (65 mg iron) tablet Take 1 Tab by mouth two (2) times a day. for anemia 30 Tab 0    fexofenadine (ALLEGRA) 60 mg tablet Take 1 Tab by mouth two (2) times a day. 30 Tab 0    labetalol (NORMODYNE) 100 mg tablet Take 1 Tab by mouth every eight (8) hours. 45 Tab 0    NIFEdipine ER (PROCARDIA XL) 30 mg ER tablet Take 1 Tab by mouth daily. 15 Tab 0    lactulose (CHRONULAC) 10 gram/15 mL solution Take 45 mL by mouth three (3) times daily as needed (constipation). 1 Bottle 0    rivastigmine tartrate (EXELON) 1.5 mg capsule Take 1 Cap by mouth two (2) times daily (with meals). 30 Cap 0    polyethylene glycol (MIRALAX) 17 gram/dose powder Take 17 g by mouth daily. 255 g 0    simvastatin (ZOCOR) 40 mg tablet Take 1 Tab by mouth nightly.  15 Tab 0    aspirin 81 mg chewable tablet Take 1 Tab by mouth daily. 15 Tab 0    hydrALAZINE (APRESOLINE) 50 mg tablet Take 1 Tab by mouth three (3) times daily. 45 Tab 0       Allergies   Allergen Reactions    Memantine Other (comments)     nightmares       Social History     Socioeconomic History    Marital status:      Spouse name: Not on file    Number of children: Not on file    Years of education: Not on file    Highest education level: Not on file   Occupational History    Not on file   Social Needs    Financial resource strain: Not on file    Food insecurity     Worry: Not on file     Inability: Not on file    Transportation needs     Medical: Not on file     Non-medical: Not on file   Tobacco Use    Smoking status: Never Smoker    Smokeless tobacco: Never Used   Substance and Sexual Activity    Alcohol use: No    Drug use: Not on file    Sexual activity: Not on file   Lifestyle    Physical activity     Days per week: Not on file     Minutes per session: Not on file    Stress: Not on file   Relationships    Social connections     Talks on phone: Not on file     Gets together: Not on file     Attends Evangelical service: Not on file     Active member of club or organization: Not on file     Attends meetings of clubs or organizations: Not on file     Relationship status: Not on file    Intimate partner violence     Fear of current or ex partner: Not on file     Emotionally abused: Not on file     Physically abused: Not on file     Forced sexual activity: Not on file   Other Topics Concern    Not on file   Social History Narrative    Not on file        FH: n/a    Review of Systems    14 pt Review of Systems is negative unless otherwise mentioned in the HPI.     Wt Readings from Last 3 Encounters:   12/16/20 83.5 kg (184 lb)   06/17/20 81.6 kg (180 lb)   10/23/19 86.2 kg (190 lb)     Temp Readings from Last 3 Encounters:   09/18/19 97.5 °F (36.4 °C)   04/18/19 97.4 °F (36.3 °C)   04/18/19 98.2 °F (36.8 °C)     BP Readings from Last 3 Encounters:   06/17/20 140/76   10/23/19 132/76   10/15/19 134/70     Pulse Readings from Last 3 Encounters:   12/16/20 68   06/17/20 69   10/23/19 65     Physical Exam:    Visit Vitals  Pulse 68   Ht 5' 10\" (1.778 m)   Wt 83.5 kg (184 lb)   SpO2 97%   BMI 26.40 kg/m²      Physical Exam  Constitutional:       Appearance: He is well-developed. HENT:      Head: Normocephalic and atraumatic. Eyes:      General: No scleral icterus. Pupils: Pupils are equal, round, and reactive to light. Neck:      Vascular: No JVD. Cardiovascular:      Rate and Rhythm: Normal rate and regular rhythm. Heart sounds: Normal heart sounds. No murmur. No friction rub. No gallop. Pulmonary:      Effort: Pulmonary effort is normal. No respiratory distress. Breath sounds: Normal breath sounds. No wheezing or rales. Chest:      Chest wall: No tenderness. Abdominal:      General: Bowel sounds are normal.      Palpations: Abdomen is soft. Skin:     General: Skin is warm and dry. Findings: No rash. Neurological:      Mental Status: He is alert and oriented to person, place, and time. EKG today: 60 bpm Second degree AVB (Mobitz type II), Wide QRS/ RBBB appearance    Impression and Plan:  Edith Brambila is a 80 y.o. with:    1.) Sinus bradycardia, with suspected intermittent AVB  2.) Recent TIA/ CVA  3.) Carotid disease  4.) Remote CAD, details unknown  5.) Normal LV function  6.) Dementia/ advanced age  9.) RBBB    1.) No further changes to medications today as they were just adjusted and doing well/ HR stable/ asymptomatic  2.) RTC 6 months routine follow up per family request    Does have some intermittent conduction system disease, trying to avoid PPM  Doesn't seem necessary regardless at this point  BP stable    Thank you for allowing me to participate in the care of your patient, please do not hesitate to call with questions or concerns.     Kindest Regards,    Teresa BARNETT Louis To DO

## 2021-04-06 NOTE — PROGRESS NOTES
Cj Baker presents today for   Chief Complaint   Patient presents with    New Patient     referred by Dr. Darian Antoine for hypertensive low heart rate - no cardiac complaints        Cj Baker preferred language for health care discussion is english/other. Is someone accompanying this pt? Son     Is the patient using any DME equipment during [address]? Rolator     Depression Screening:  3 most recent PHQ Screens 8/14/2019   Little interest or pleasure in doing things Not at all   Feeling down, depressed, irritable, or hopeless Not at all   Total Score PHQ 2 0       Learning Assessment:  Learning Assessment 8/14/2019   PRIMARY LEARNER Patient   PRIMARY LANGUAGE ENGLISH   LEARNER PREFERENCE PRIMARY DEMONSTRATION   ANSWERED BY Patient   RELATIONSHIP SELF       Abuse Screening:  No flowsheet data found. Fall Risk  Fall Risk Assessment, last 12 mths 8/14/2019   Able to walk? Yes   Fall in past 12 months? No   Number of falls in past 12 months -       Pt currently taking Anticoagulant therapy? no    Coordination of Care:  1. Have you been to the ER, urgent care clinic since your last visit? Hospitalized since your last visit? no    2. Have you seen or consulted any other health care providers outside of the [address] [place] [address] since your last visit? Include any pap smears or colon screening.  no Post-Op Assessment Note    CV Status:  Stable  Pain Score: 0    Pain management: adequate     Mental Status:  Alert and awake   Hydration Status:  Euvolemic and stable   PONV Controlled:  Controlled   Airway Patency:  Patent      Post Op Vitals Reviewed: Yes      Staff: Anesthesiologist, CRNA   Comments: Report given to recovering RN, VSS  Pt states she is comfortable        No complications documented      /64 (04/06/21 0946)    Temp     Pulse 78 (04/06/21 0946)   Resp 18 (04/06/21 0946)    SpO2 100 % (04/06/21 0946)

## 2021-06-30 ENCOUNTER — OFFICE VISIT (OUTPATIENT)
Dept: CARDIOLOGY CLINIC | Age: 86
End: 2021-06-30
Payer: MEDICARE

## 2021-06-30 VITALS
DIASTOLIC BLOOD PRESSURE: 64 MMHG | OXYGEN SATURATION: 99 % | WEIGHT: 180 LBS | HEIGHT: 70 IN | SYSTOLIC BLOOD PRESSURE: 136 MMHG | HEART RATE: 64 BPM | BODY MASS INDEX: 25.77 KG/M2

## 2021-06-30 DIAGNOSIS — I45.2 RBBB (RIGHT BUNDLE BRANCH BLOCK WITH LEFT ANTERIOR FASCICULAR BLOCK): ICD-10-CM

## 2021-06-30 DIAGNOSIS — R00.1 BRADYCARDIA: Primary | ICD-10-CM

## 2021-06-30 DIAGNOSIS — I10 ESSENTIAL HYPERTENSION: ICD-10-CM

## 2021-06-30 PROCEDURE — G8536 NO DOC ELDER MAL SCRN: HCPCS | Performed by: INTERNAL MEDICINE

## 2021-06-30 PROCEDURE — G8419 CALC BMI OUT NRM PARAM NOF/U: HCPCS | Performed by: INTERNAL MEDICINE

## 2021-06-30 PROCEDURE — G8510 SCR DEP NEG, NO PLAN REQD: HCPCS | Performed by: INTERNAL MEDICINE

## 2021-06-30 PROCEDURE — 1101F PT FALLS ASSESS-DOCD LE1/YR: CPT | Performed by: INTERNAL MEDICINE

## 2021-06-30 PROCEDURE — G8427 DOCREV CUR MEDS BY ELIG CLIN: HCPCS | Performed by: INTERNAL MEDICINE

## 2021-06-30 PROCEDURE — 99214 OFFICE O/P EST MOD 30 MIN: CPT | Performed by: INTERNAL MEDICINE

## 2021-06-30 PROCEDURE — 93000 ELECTROCARDIOGRAM COMPLETE: CPT | Performed by: INTERNAL MEDICINE

## 2021-06-30 NOTE — PROGRESS NOTES
Krystina Tiwari    Post hospital TIA, HTN, h/o CAD    HPI    Krystina Tiwari is a 80 y.o. referred for HTN and low HRs. I personally obtained and reviewed available records. I see pt was last hospitalized in 3/2019 for a LGIB/ acute blood loss anemia. He has some remote history of CAD but hasn't been seen by a cardiologist in years. I was able to find an echocardiogram report from 2009 with normal LV function 65%, no significant valvular pathology but abnormal diastolic parameters. More recently he was hospitalized [facility] 4/6/19 for acute mental status change: He had an echo that was normal. There was questionable AV block in route by EMS but during hospitalization only sinus lilia documented. Pt himself has dementia, had an acute CVA (embolic suspected) as has known moderate carotid stenosis. Pt was discharged from the hospital  and came straight to my office last time. I personally obtained and reviewed his medical records and see that he presented with mental status changes and was found to have a TIA. Overall he was monitored closely and discharge held today as his blood pressures continued to be somewhat uncontrolled. Medications were finally adjusted and he was discharged in stable condition this morning. His blood pressure in my office is much better in my office and he has no complaints- denies CP SOB, palps etc.    They have been and again he has no complaints today- except says he coughs and has to eat soft food or he \"has a problem. \" His BP is elevated but says it was 130s this morning at his facility. Doing well, BP better controlled since last ER visit 12/2019    Past Medical History:   Diagnosis Date    Arthritis     Cancer [facility])     Coronary atherosclerosis of native coronary artery     AS DESCRIBED IN CATH CARDIAC CATH IN 9/07 SHOWS 80% DISTAL LAD AND 40% PROXIMAL LAD DISEASE . HE IS ON MEDICAL TREATMENT.  ASA AND B-BLOCKER 3/10 RENAL ARTERY DUPLEX : NL RT RENAL ARTERY WITH < 60% STENOSIS OF LEFT RENAL ARTERY    Diabetes ([facility] [address]     Glaucoma     Hypertension     Impotence of organic origin     Malignant neoplasm of prostate ([facility] [address] [facility]     Other and unspecified hyperlipidemia        Past Surgical History:   Procedure Laterality Date    COLONOSCOPY N/A 3/16/2019    COLONOSCOPY w/ polypectomies performed by Artemio Wan [facility] at [facility]  3/16/2019         COLONOSCOPY,MYRON SCOTT,JARROD  3/16/2019            Current Outpatient Medications   Medication Sig Dispense Refill    metFORMIN (GLUCOPHAGE) 500 mg tablet Take  by mouth two (2) times daily (with meals).  valsartan (DIOVAN) 160 mg tablet Take  by mouth daily.  cloNIDine (CATAPRES) 0.3 mg/24 hr 1 Patch by TransDERmal route every seven (7) days. 4 Patch 0    acetaminophen (TYLENOL) 325 mg tablet Take 1 Tab by mouth every four (4) hours as needed for Pain. 12 Tab 0    benzonatate (TESSALON) 100 mg capsule Take 1 Cap by mouth three (3) times daily as needed for Cough. 12 Cap 0    alfuzosin SR (UROXATRAL) 10 mg SR tablet Take 1 Tab by mouth nightly as needed for Other (prostate). 15 Tab 0    ferrous sulfate (FEOSOL) 325 mg (65 mg iron) tablet Take 1 Tab by mouth two (2) times a day. for anemia 30 Tab 0    fexofenadine (ALLEGRA) 60 mg tablet Take 1 Tab by mouth two (2) times a day. 30 Tab 0    labetalol (NORMODYNE) 100 mg tablet Take 1 Tab by mouth every eight (8) hours. 45 Tab 0    NIFEdipine ER (PROCARDIA XL) 30 mg ER tablet Take 1 Tab by mouth daily. 15 Tab 0    lactulose (CHRONULAC) 10 gram/15 mL solution Take 45 mL by mouth three (3) times daily as needed (constipation). 1 Bottle 0    rivastigmine tartrate (EXELON) 1.5 mg capsule Take 1 Cap by mouth two (2) times daily (with meals). 30 Cap 0    polyethylene glycol (MIRALAX) 17 gram/dose powder Take 17 g by mouth daily. 255 g 0    simvastatin (ZOCOR) 40 mg tablet Take 1 Tab by mouth nightly.  15 Tab 0    aspirin 81 mg chewable tablet Take 1 Tab by mouth daily. 15 Tab 0    hydrALAZINE (APRESOLINE) 50 mg tablet Take 1 Tab by mouth three (3) times daily. 45 Tab 0       Allergies   Allergen Reactions    Memantine Other (comments)     nightmares       Social History     Socioeconomic History    Marital status:      Spouse name: Not on file    Number of children: Not on file    Years of education: Not on file    Highest education level: Not on file   Occupational History    Not on file   Tobacco Use    Smoking status: Never Smoker    Smokeless tobacco: Never Used   Substance and Sexual Activity    Alcohol use: No    Drug use: Not on file    Sexual activity: Not on file   Other Topics Concern    Not on file   Social History Narrative    Not on file     Social Determinants of Health     Financial Resource Strain:     Difficulty of Paying Living Expenses:    Food Insecurity:     Worried About Running Out of Food in the Last Year:     [address] in the Last Year:    Transportation Needs:     Lack of Transportation (Medical):  Lack of Transportation (Non-Medical):    Physical Activity:     Days of Exercise per Week:     Minutes of Exercise per Session:    Stress:     Feeling of Stress :    Social Connections:     Frequency of Communication with Friends and Family:     Frequency of Social Gatherings with Friends and Family:     Attends Confucianism Services:     Active Member of Clubs or Organizations:     Attends Club or Organization Meetings:     Marital Status:    Intimate Partner Violence:     Fear of Current or Ex-Partner:     Emotionally Abused:     Physically Abused:     Sexually Abused:         FH: n/a    Review of Systems    14 pt Review of Systems is negative unless otherwise mentioned in the HPI.     Wt Readings from Last 3 Encounters:   06/30/21 81.6 kg (180 lb)   12/16/20 83.5 kg (184 lb)   06/17/20 81.6 kg (180 lb)     Temp Readings from Last 3 Encounters:   09/18/19 97.5 °F (36.4 °C) 04/18/19 97.4 °F (36.3 °C)   04/18/19 98.2 °F (36.8 °C)     BP Readings from Last 3 Encounters:   06/30/21 136/64   12/16/20 (!) 180/77   06/17/20 140/76     Pulse Readings from Last 3 Encounters:   06/30/21 64   12/16/20 68   06/17/20 69     Physical Exam:    Visit Vitals  /64 (BP 1 Location: Right arm, BP Patient Position: Sitting, BP Cuff Size: Small adult)   Pulse 64   Ht 5' 10\" (1.778 m)   Wt 81.6 kg (180 lb)   SpO2 99%   BMI 25.83 kg/m²      Physical Exam  Constitutional:       Appearance: He is well-developed. HENT:      Head: Normocephalic and atraumatic. Eyes:      General: No scleral icterus. Pupils: Pupils are equal, round, and reactive to light. Neck:      Vascular: No JVD. Cardiovascular:      Rate and Rhythm: Normal rate and regular rhythm. Heart sounds: Normal heart sounds. No murmur heard. No friction rub. No gallop. Pulmonary:      Effort: Pulmonary effort is normal. No respiratory distress. Breath sounds: Normal breath sounds. No wheezing or rales. Chest:      Chest wall: No tenderness. Abdominal:      General: Bowel sounds are normal.      Palpations: Abdomen is soft. Skin:     General: Skin is warm and dry. Findings: No rash. Neurological:      Mental Status: He is alert and oriented to person, place, and time.          EKG today: 60 bpm Second degree AVB (Mobitz type II), Wide QRS/ RBBB appearance    Impression and Plan:  Carola Shaikh is a 80 y.o. with:    1.) Sinus bradycardia, with suspected intermittent AVB  2.) Recent TIA/ CVA  3.) Carotid disease  4.) Remote CAD, details unknown  5.) Normal LV function  6.) Dementia/ advanced age  9.) RBBB    1.) No further changes to medications today as they were just adjusted and doing well/ HR stable/ asymptomatic  2.) RTC 6 months routine follow up per family request    Does have some intermittent conduction system disease, trying to avoid PPM  Doesn't seem necessary regardless at this point  BP stable    Thank you for allowing me to participate in the care of your patient, please do not hesitate to call with questions or concerns. Follow-up and Dispositions    · Return in about 6 months (around 12/30/2021).      Kindest Regards,    Krish Bentley, DO

## 2021-06-30 NOTE — PROGRESS NOTES
Vishnu Enriquez presents today for   Chief Complaint   Patient presents with    Follow-up     6 month follow up       Vishnu Enriquez preferred language for health care discussion is english/other. Is someone accompanying this pt? yes    Is the patient using any DME equipment during [address]? walker    Depression Screening:  3 most recent PHQ Screens 6/30/2021   Little interest or pleasure in doing things Not at all   Feeling down, depressed, irritable, or hopeless Not at all   Total Score PHQ 2 0       Learning Assessment:  Learning Assessment 6/30/2021   PRIMARY LEARNER Patient   PRIMARY LANGUAGE ENGLISH   LEARNER PREFERENCE PRIMARY DEMONSTRATION   ANSWERED BY patient   RELATIONSHIP SELF       Abuse Screening:  Abuse Screening Questionnaire 6/30/2021   Do you ever feel afraid of your partner? N   Are you in a relationship with someone who physically or mentally threatens you? N   Is it safe for you to go home? Y       Fall Risk  Fall Risk Assessment, last 12 mths 6/30/2021   Able to walk? Yes   Fall in past 12 months? 0   Do you feel unsteady? 0   Are you worried about falling 0   Number of falls in past 12 months -           Pt currently taking Anticoagulant therapy? no    Pt currently taking Antiplatelet therapy ? ASA 81 mg once a day      Coordination of Care:  1. Have you been to the ER, urgent care clinic since your last visit? Hospitalized since your last visit? no    2. Have you seen or consulted any other health care providers outside of the [address] [place] [address] since your last visit? Include any pap smears or colon screening.  no

## 2021-11-05 ENCOUNTER — TRANSCRIBE ORDER (OUTPATIENT)
Dept: SCHEDULING | Age: 86
End: 2021-11-05

## 2021-11-05 DIAGNOSIS — R13.10 DYSPHAGIA: Primary | ICD-10-CM

## 2021-11-10 ENCOUNTER — HOSPITAL ENCOUNTER (OUTPATIENT)
Dept: GENERAL RADIOLOGY | Age: 86
Discharge: HOME | End: 2021-11-10
Attending: NURSE PRACTITIONER
Payer: MEDICARE

## 2021-11-10 DIAGNOSIS — R13.10 DYSPHAGIA, UNSPECIFIED TYPE: ICD-10-CM

## 2021-11-10 DIAGNOSIS — R13.10 DYSPHAGIA: ICD-10-CM

## 2021-11-10 PROCEDURE — 74011000250 HC RX REV CODE- 250

## 2021-11-10 PROCEDURE — 92611 MOTION FLUOROSCOPY/SWALLOW: CPT

## 2021-11-10 PROCEDURE — 74230 X-RAY XM SWLNG FUNCJ C+: CPT

## 2021-11-10 RX ADMIN — BARIUM SULFATE 30 ML: 400 PASTE ORAL at 11:30

## 2021-11-10 RX ADMIN — BARIUM SULFATE 45 G: 960 POWDER, FOR SUSPENSION ORAL at 11:30

## 2021-11-10 RX ADMIN — BARIUM SULFATE 700 MG: 700 TABLET ORAL at 11:30

## 2021-11-10 RX ADMIN — BARIUM SULFATE 30 ML: 400 SUSPENSION ORAL at 11:30

## 2021-11-10 NOTE — PROGRESS NOTES
[address] OUTPATIENT MODIFIED BARIUM SWALLOW STUDY    Patient: Lauri Nice (28 y.o. male)  Date: 11/10/2021  Primary Diagnosis: Dysphagia [R13.10]  Precautions: Aspiration        Assessment:  Pt seen with family present who report pt is working with SLP and is utilizing small sips and chin tuck with liquids. Based on the objective data described below, the patient presents with mild oral and moderate pharyngeal dysphagia characterized by silent airway compromise with thin liquids (to laryngeal vestibule with thin liquids via cup sip and to vocal cords with straw presentation). Chin tuck successful in improving penetration on only 1/4 trials with thin liquids. Pt able to tolerate NTL +/- straw, pudding, regular solids and 13 mm Ba pill with NTL wash with positive airway protection noted across multiple trials. Deficits include delayed a-p transit, decreased laryngeal elevation/adduction/sensation and slowed epiglottic inversion. Recommend regular diet with NTL, meds with NTL and follow up with SLP. Results/recommendations discussed with pt and family with video feedback and written handout. Understanding verbalized. Recommendations:   Regular diet with nectar thick liquids  Meds with nectar thick liquids   Aspiration precautions  HOB >45 during po intake, remain >30 for 30-45 minutes after po   Small bites/sips; alternate liquid/solid with slow feeding rate   Oral care TID  Follow up with SLP      SUBJECTIVE:   Patient stated I saw what this test would be like on a video before I came in    OBJECTIVE:     Past Medical History:   Diagnosis Date    Arthritis     Cancer ([facility] [place] [address]     Coronary atherosclerosis of native coronary artery     AS DESCRIBED IN CATH CARDIAC CATH IN 9/07 SHOWS 80% DISTAL LAD AND 40% PROXIMAL LAD DISEASE . HE IS ON MEDICAL TREATMENT.  ASA AND B-BLOCKER 3/10 RENAL ARTERY DUPLEX : NL RT RENAL ARTERY WITH < 60% STENOSIS OF LEFT RENAL ARTERY    Diabetes ([facility] [address]     Glaucoma     Hypertension     Impotence of organic origin     Malignant neoplasm of prostate ([facility] [address]     Other and unspecified hyperlipidemia      Past Surgical History:   Procedure Laterality Date    COLONOSCOPY N/A 3/16/2019    COLONOSCOPY w/ polypectomies performed by Ashley Navarrete [facility] at [facility]  3/16/2019         [facility]  3/16/2019          Current Diet:  Regular diet with thin liquids; recommend regular diet with NTL    Radiology:  Film Views: Fluoro; Lateral  Patient Position: 90 in chair    Trial 1: Trial 2:   Consistency Presented: Thin liquid Consistency Presented: Nectar thick liquid; Pudding; Solid (13 mm Ba pill with NTL wash)   How Presented: Self-fed/presented; Cup/sip; Straw How Presented: Self-fed/presented; Spoon; Successive swallows         Bolus Acceptance: No impairment Bolus Acceptance: No impairment   Bolus Formation/Control: Impaired: Delayed Bolus Formation/Control: Impaired: Delayed   Propulsion: Delayed (# of seconds) Propulsion: Delayed (# of seconds)     Oral Residue: None   Initiation of Swallow: No impairment     Timing: No impairment Timing: No impairment   Penetration: During swallow; To cords; To laryngeal vestibule; From initial swallow Penetration: None   Aspiration/Timing: No evidence of aspiration Aspiration/Timing: No evidence of aspiration   Pharyngeal Clearance: No residue Pharyngeal Clearance: No residue   Attempted Modifications: Small sips and bites; Chin tuck; Cup/sip     Effective Modifications:  (1/4 trials with chin tuck)     Cues for Modifications: Minimal-moderate         Decreased Tongue Base Retraction?: No  Laryngeal Elevation: Inadequate epiglottic inversion;  Incomplete laryngeal closure  Aspiration/Penetration Score: 5 (Penetration/Visible residue-Contrast contacts the folds, but is not ejected)   Pharyngeal Symmetry: Not assessed  Pharyngeal-Esophageal Segment: No impairment  Pharyngeal Dysfunction: Decreased strength; Decreased elevation/closure  Oral Phase Severity: Mild  Pharyngeal Phase Severity: Moderate    8-point Penetration-Aspiration Scale: Score 5    PAIN:  Pt reports 0/10 pain or discomfort prior to MBS. Pt reports 0/10 pain or discomfort post MBS. COMMUNICATION/EDUCATION:   [x]  Education provided post diagnostic testing including oropharyngeal anatomy/physiology, MBS results, diet recommendations and        compensatory strategies/positioning. [x]  Video feedback utilized. [x]  Handout regarding diet recommendations and thickener instructions provided. [x]  Patient/family have participated as able in goal setting and plan of care. []  Patient/family agree to work toward stated goals and plan of care. []  Patient understands intent and goals of therapy, but is neutral about his/her participation. []  Patient is unable to participate in goal setting and plan of care.     Thank you for this referral,  Jaimee Nava M.S., [id number] [facility]  Speech-Language Pathologist

## 2021-12-16 ENCOUNTER — OFFICE VISIT (OUTPATIENT)
Dept: CARDIOLOGY CLINIC | Age: 86
End: 2021-12-16
Payer: MEDICARE

## 2021-12-16 VITALS
DIASTOLIC BLOOD PRESSURE: 60 MMHG | HEIGHT: 70 IN | OXYGEN SATURATION: 99 % | BODY MASS INDEX: 25.2 KG/M2 | HEART RATE: 71 BPM | WEIGHT: 176 LBS | SYSTOLIC BLOOD PRESSURE: 134 MMHG

## 2021-12-16 DIAGNOSIS — I45.2 RBBB (RIGHT BUNDLE BRANCH BLOCK WITH LEFT ANTERIOR FASCICULAR BLOCK): Primary | ICD-10-CM

## 2021-12-16 DIAGNOSIS — I10 ESSENTIAL HYPERTENSION: ICD-10-CM

## 2021-12-16 DIAGNOSIS — R00.1 BRADYCARDIA: ICD-10-CM

## 2021-12-16 PROCEDURE — G8510 SCR DEP NEG, NO PLAN REQD: HCPCS | Performed by: INTERNAL MEDICINE

## 2021-12-16 PROCEDURE — G8419 CALC BMI OUT NRM PARAM NOF/U: HCPCS | Performed by: INTERNAL MEDICINE

## 2021-12-16 PROCEDURE — 99214 OFFICE O/P EST MOD 30 MIN: CPT | Performed by: INTERNAL MEDICINE

## 2021-12-16 PROCEDURE — 1101F PT FALLS ASSESS-DOCD LE1/YR: CPT | Performed by: INTERNAL MEDICINE

## 2021-12-16 PROCEDURE — G8427 DOCREV CUR MEDS BY ELIG CLIN: HCPCS | Performed by: INTERNAL MEDICINE

## 2021-12-16 PROCEDURE — G8536 NO DOC ELDER MAL SCRN: HCPCS | Performed by: INTERNAL MEDICINE

## 2021-12-16 NOTE — PROGRESS NOTES
eKsha Bhat presents today for   Chief Complaint   Patient presents with    Follow-up     6 month follow up- no complaints        Kesha Bhat preferred language for health care discussion is english/other. Is someone accompanying this pt? Yes, son     Is the patient using any DME equipment during [address]? Walker     Depression Screening:  3 most recent PHQ Screens 6/30/2021   Little interest or pleasure in doing things Not at all   Feeling down, depressed, irritable, or hopeless Not at all   Total Score PHQ 2 0       Learning Assessment:  Learning Assessment 6/30/2021   PRIMARY LEARNER Patient   PRIMARY LANGUAGE ENGLISH   LEARNER PREFERENCE PRIMARY DEMONSTRATION   ANSWERED BY patient   RELATIONSHIP SELF       Abuse Screening:  Abuse Screening Questionnaire 6/30/2021   Do you ever feel afraid of your partner? N   Are you in a relationship with someone who physically or mentally threatens you? N   Is it safe for you to go home? Y       Fall Risk  Fall Risk Assessment, last 12 mths 6/30/2021   Able to walk? Yes   Fall in past 12 months? 0   Do you feel unsteady? 0   Are you worried about falling 0   Number of falls in past 12 months -       Pt currently taking Anticoagulant therapy? no    Coordination of Care:  1. Have you been to the ER, urgent care clinic since your last visit? Hospitalized since your last visit? no    2. Have you seen or consulted any other health care providers outside of the [address] [place] [address] since your last visit? Include any pap smears or colon screening.  no

## 2021-12-16 NOTE — PROGRESS NOTES
Zoran Bright    Post hospital TIA, HTN, h/o CAD    HPI    Zoran Bright is a 80 y.o. referred for HTN and low HRs. I personally obtained and reviewed available records. I see pt was last hospitalized in 3/2019 for a LGIB/ acute blood loss anemia. He has some remote history of CAD but hasn't been seen by a cardiologist in years. I was able to find an echocardiogram report from 2009 with normal LV function 65%, no significant valvular pathology but abnormal diastolic parameters. More recently he was hospitalized [facility] 4/6/19 for acute mental status change: He had an echo that was normal. There was questionable AV block in route by EMS but during hospitalization only sinus lilia documented. Pt himself has dementia, had an acute CVA (embolic suspected) as has known moderate carotid stenosis. Pt was discharged from the hospital  and came straight to my office last time. I personally obtained and reviewed his medical records and see that he presented with mental status changes and was found to have a TIA. Overall he was monitored closely and discharge held today as his blood pressures continued to be somewhat uncontrolled. Medications were finally adjusted and he was discharged in stable condition this morning. His blood pressure in my office is much better in my office and he has no complaints- denies CP SOB, palps etc.    They have been and again he has no complaints today- except says he coughs and has to eat soft food or he \"has a problem. \" His BP is elevated but says it was 130s this morning at his facility. Doing well, BP better controlled since last ER visit 12/2019    Past Medical History:   Diagnosis Date    Arthritis     Cancer [facility])     Coronary atherosclerosis of native coronary artery     AS DESCRIBED IN CATH CARDIAC CATH IN 9/07 SHOWS 80% DISTAL LAD AND 40% PROXIMAL LAD DISEASE . HE IS ON MEDICAL TREATMENT.  ASA AND B-BLOCKER 3/10 RENAL ARTERY DUPLEX : NL RT RENAL ARTERY WITH < 60% STENOSIS OF LEFT RENAL ARTERY    [facility] [address]     Glaucoma     Hypertension     Impotence of organic origin     Malignant neoplasm of prostate ([facility] [address]     Other and unspecified hyperlipidemia        Past Surgical History:   Procedure Laterality Date    COLONOSCOPY N/A 3/16/2019    COLONOSCOPY w/ polypectomies performed by Juan Ortega MD at [facility]  3/16/2019         COLONOSCOPY,MYRON SCOTT,JARROD  3/16/2019            Current Outpatient Medications   Medication Sig Dispense Refill    metFORMIN (GLUCOPHAGE) 500 mg tablet Take  by mouth two (2) times daily (with meals).  valsartan (DIOVAN) 160 mg tablet Take  by mouth daily.  cloNIDine (CATAPRES) 0.3 mg/24 hr 1 Patch by TransDERmal route every seven (7) days. 4 Patch 0    acetaminophen (TYLENOL) 325 mg tablet Take 1 Tab by mouth every four (4) hours as needed for Pain. 12 Tab 0    benzonatate (TESSALON) 100 mg capsule Take 1 Cap by mouth three (3) times daily as needed for Cough. 12 Cap 0    alfuzosin SR (UROXATRAL) 10 mg SR tablet Take 1 Tab by mouth nightly as needed for Other (prostate). 15 Tab 0    ferrous sulfate (FEOSOL) 325 mg (65 mg iron) tablet Take 1 Tab by mouth two (2) times a day. for anemia 30 Tab 0    fexofenadine (ALLEGRA) 60 mg tablet Take 1 Tab by mouth two (2) times a day. 30 Tab 0    labetalol (NORMODYNE) 100 mg tablet Take 1 Tab by mouth every eight (8) hours. 45 Tab 0    NIFEdipine ER (PROCARDIA XL) 30 mg ER tablet Take 1 Tab by mouth daily. 15 Tab 0    lactulose (CHRONULAC) 10 gram/15 mL solution Take 45 mL by mouth three (3) times daily as needed (constipation). 1 Bottle 0    rivastigmine tartrate (EXELON) 1.5 mg capsule Take 1 Cap by mouth two (2) times daily (with meals). 30 Cap 0    polyethylene glycol (MIRALAX) 17 gram/dose powder Take 17 g by mouth daily. 255 g 0    simvastatin (ZOCOR) 40 mg tablet Take 1 Tab by mouth nightly.  15 Tab 0    aspirin 81 mg chewable tablet Take 1 Tab by mouth daily. 15 Tab 0    hydrALAZINE (APRESOLINE) 50 mg tablet Take 1 Tab by mouth three (3) times daily. 45 Tab 0       Allergies   Allergen Reactions    Memantine Other (comments)     nightmares       Social History     Socioeconomic History    Marital status:      Spouse name: Not on file    Number of children: Not on file    Years of education: Not on file    Highest education level: Not on file   Occupational History    Not on file   Tobacco Use    Smoking status: Never Smoker    Smokeless tobacco: Never Used   Substance and Sexual Activity    Alcohol use: No    Drug use: Not on file    Sexual activity: Not on file   Other Topics Concern    Not on file   Social History Narrative    Not on file     Social Determinants of Health     Financial Resource Strain:     Difficulty of Paying Living Expenses: Not on file   Food Insecurity:     Worried About Running Out of Food in the Last Year: Not on file    Fran of Food in the Last Year: Not on file   Transportation Needs:     Lack of Transportation (Medical): Not on file    Lack of Transportation (Non-Medical):  Not on file   Physical Activity:     Days of Exercise per Week: Not on file    Minutes of Exercise per Session: Not on file   Stress:     Feeling of Stress : Not on file   Social Connections:     Frequency of Communication with Friends and Family: Not on file    Frequency of Social Gatherings with Friends and Family: Not on file    Attends Confucianist Services: Not on file    Active Member of Clubs or Organizations: Not on file    Attends Club or Organization Meetings: Not on file    Marital Status: Not on file   Intimate Partner Violence:     Fear of Current or Ex-Partner: Not on file    Emotionally Abused: Not on file    Physically Abused: Not on file    Sexually Abused: Not on file   Housing Stability:     Unable to Pay for Housing in the Last Year: Not on file    [facility] of [facility] in the Last Year: Not on file    Unstable Housing in the Last Year: Not on file        FH: n/a    Review of Systems    14 pt Review of Systems is negative unless otherwise mentioned in the HPI. Wt Readings from Last 3 Encounters:   12/16/21 79.8 kg (176 lb)   06/30/21 81.6 kg (180 lb)   12/16/20 83.5 kg (184 lb)     Temp Readings from Last 3 Encounters:   09/18/19 97.5 °F (36.4 °C)   04/18/19 97.4 °F (36.3 °C)   04/18/19 98.2 °F (36.8 °C)     BP Readings from Last 3 Encounters:   12/16/21 134/60   06/30/21 136/64   12/16/20 (!) 180/77     Pulse Readings from Last 3 Encounters:   12/16/21 71   06/30/21 64   12/16/20 68     Physical Exam:    Visit Vitals  /60 (BP 1 Location: Right upper arm, BP Patient Position: Sitting, BP Cuff Size: Adult)   Pulse 71   Ht 5' 10\" (1.778 m)   Wt 79.8 kg (176 lb)   SpO2 99%   BMI 25.25 kg/m²      Physical Exam  Constitutional:       Appearance: He is well-developed. HENT:      Head: Normocephalic and atraumatic. Eyes:      General: No scleral icterus. Pupils: Pupils are equal, round, and reactive to light. Neck:      Vascular: No JVD. Cardiovascular:      Rate and Rhythm: Normal rate and regular rhythm. Heart sounds: Normal heart sounds. No murmur heard. No friction rub. No gallop. Pulmonary:      Effort: Pulmonary effort is normal. No respiratory distress. Breath sounds: Normal breath sounds. No wheezing or rales. Chest:      Chest wall: No tenderness. Abdominal:      General: Bowel sounds are normal.      Palpations: Abdomen is soft. Skin:     General: Skin is warm and dry. Findings: No rash. Neurological:      Mental Status: He is alert and oriented to person, place, and time.          EKG today: 60 bpm Second degree AVB (Mobitz type II), Wide QRS/ RBBB appearance    Impression and Plan:  Kesha Bhat is a 80 y.o. with:    1.) Sinus bradycardia, with suspected intermittent AVB  2.) Recent TIA/ CVA  3.) Carotid disease  4.) Remote CAD, details unknown  5.) Normal LV function  6.) Dementia/ advanced age  9.) RBBB    1.) No further changes to medications today as they were just adjusted and doing well/ HR stable/ asymptomatic  2.) RTC 6 months routine follow up per family request    Does have some intermittent conduction system disease, trying to avoid PPM  Doesn't seem necessary regardless at this point  BP stable    Thank you for allowing me to participate in the care of your patient, please do not hesitate to call with questions or concerns. Follow-up and Dispositions    · Return in about 6 months (around 6/16/2022).      Kindest Regards,    Juan Baer, DO

## 2022-01-27 ENCOUNTER — HOSPITAL ENCOUNTER (EMERGENCY)
Age: 87
Discharge: HOME | End: 2022-01-27
Attending: STUDENT IN AN ORGANIZED HEALTH CARE EDUCATION/TRAINING PROGRAM
Payer: MEDICARE

## 2022-01-27 ENCOUNTER — APPOINTMENT (OUTPATIENT)
Dept: GENERAL RADIOLOGY | Age: 87
End: 2022-01-27
Attending: STUDENT IN AN ORGANIZED HEALTH CARE EDUCATION/TRAINING PROGRAM
Payer: MEDICARE

## 2022-01-27 VITALS
WEIGHT: 176 LBS | OXYGEN SATURATION: 99 % | DIASTOLIC BLOOD PRESSURE: 69 MMHG | BODY MASS INDEX: 26.67 KG/M2 | TEMPERATURE: 97.9 F | HEIGHT: 68 IN | RESPIRATION RATE: 12 BRPM | SYSTOLIC BLOOD PRESSURE: 153 MMHG

## 2022-01-27 DIAGNOSIS — R07.9 CHEST PAIN, UNSPECIFIED TYPE: Primary | ICD-10-CM

## 2022-01-27 LAB
ALBUMIN SERPL-MCNC: 3.6 G/DL (ref 3.4–5)
ALBUMIN/GLOB SERPL: 1 {RATIO} (ref 0.8–1.7)
ALP SERPL-CCNC: 102 U/L (ref 45–117)
ALT SERPL-CCNC: 18 U/L (ref 16–61)
ANION GAP SERPL CALC-SCNC: 8 MMOL/L (ref 3–18)
AST SERPL-CCNC: 18 U/L (ref 10–38)
BASOPHILS # BLD: 0.1 K/UL (ref 0–0.1)
BASOPHILS NFR BLD: 1 % (ref 0–2)
BILIRUB SERPL-MCNC: 0.2 MG/DL (ref 0.2–1)
BUN SERPL-MCNC: 13 MG/DL (ref 7–18)
BUN/CREAT SERPL: 13 (ref 12–20)
CALCIUM SERPL-MCNC: 9.2 MG/DL (ref 8.5–10.1)
CHLORIDE SERPL-SCNC: 108 MMOL/L (ref 100–111)
CO2 SERPL-SCNC: 25 MMOL/L (ref 21–32)
COVID-19 RAPID TEST, COVR: NOT DETECTED
CREAT SERPL-MCNC: 1 MG/DL (ref 0.6–1.3)
DIFFERENTIAL METHOD BLD: ABNORMAL
EOSINOPHIL # BLD: 0.5 K/UL (ref 0–0.4)
EOSINOPHIL NFR BLD: 7 % (ref 0–5)
ERYTHROCYTE [DISTWIDTH] IN BLOOD BY AUTOMATED COUNT: 12.2 % (ref 11.6–14.5)
GLOBULIN SER CALC-MCNC: 3.6 G/DL (ref 2–4)
GLUCOSE SERPL-MCNC: 215 MG/DL (ref 74–99)
HCT VFR BLD AUTO: 37.6 % (ref 36–48)
HGB BLD-MCNC: 11.6 G/DL (ref 13–16)
IMM GRANULOCYTES # BLD AUTO: 0 K/UL (ref 0–0.04)
IMM GRANULOCYTES NFR BLD AUTO: 0 % (ref 0–0.5)
LYMPHOCYTES # BLD: 1.8 K/UL (ref 0.9–3.6)
LYMPHOCYTES NFR BLD: 26 % (ref 21–52)
MAGNESIUM SERPL-MCNC: 2.3 MG/DL (ref 1.6–2.6)
MCH RBC QN AUTO: 29.7 PG (ref 24–34)
MCHC RBC AUTO-ENTMCNC: 30.9 G/DL (ref 31–37)
MCV RBC AUTO: 96.2 FL (ref 78–100)
MONOCYTES # BLD: 0.8 K/UL (ref 0.05–1.2)
MONOCYTES NFR BLD: 11 % (ref 3–10)
NEUTS SEG # BLD: 3.9 K/UL (ref 1.8–8)
NEUTS SEG NFR BLD: 55 % (ref 40–73)
NRBC # BLD: 0 K/UL (ref 0–0.01)
NRBC BLD-RTO: 0 PER 100 WBC
PLATELET # BLD AUTO: 286 K/UL (ref 135–420)
PMV BLD AUTO: 9.7 FL (ref 9.2–11.8)
POTASSIUM SERPL-SCNC: 3.9 MMOL/L (ref 3.5–5.5)
PROT SERPL-MCNC: 7.2 G/DL (ref 6.4–8.2)
RBC # BLD AUTO: 3.91 M/UL (ref 4.35–5.65)
SODIUM SERPL-SCNC: 141 MMOL/L (ref 136–145)
SOURCE, COVRS: NORMAL
TROPONIN-HIGH SENSITIVITY: 27 NG/L (ref 0–78)
WBC # BLD AUTO: 7.2 K/UL (ref 4.6–13.2)

## 2022-01-27 PROCEDURE — 74011250637 HC RX REV CODE- 250/637: Performed by: STUDENT IN AN ORGANIZED HEALTH CARE EDUCATION/TRAINING PROGRAM

## 2022-01-27 PROCEDURE — 71046 X-RAY EXAM CHEST 2 VIEWS: CPT

## 2022-01-27 PROCEDURE — 93005 ELECTROCARDIOGRAM TRACING: CPT

## 2022-01-27 PROCEDURE — 80053 COMPREHEN METABOLIC PANEL: CPT

## 2022-01-27 PROCEDURE — 83735 ASSAY OF MAGNESIUM: CPT

## 2022-01-27 PROCEDURE — 99284 EMERGENCY DEPT VISIT MOD MDM: CPT

## 2022-01-27 PROCEDURE — 85025 COMPLETE CBC W/AUTO DIFF WBC: CPT

## 2022-01-27 PROCEDURE — 84484 ASSAY OF TROPONIN QUANT: CPT

## 2022-01-27 PROCEDURE — 87635 SARS-COV-2 COVID-19 AMP PRB: CPT

## 2022-01-27 RX ORDER — DICYCLOMINE HYDROCHLORIDE 10 MG/1
20 CAPSULE ORAL
Status: COMPLETED | OUTPATIENT
Start: 2022-01-27 | End: 2022-01-27

## 2022-01-27 RX ORDER — FAMOTIDINE 20 MG/1
20 TABLET, FILM COATED ORAL ONCE
Status: COMPLETED | OUTPATIENT
Start: 2022-01-27 | End: 2022-01-27

## 2022-01-27 RX ORDER — IBUPROFEN 600 MG/1
600 TABLET ORAL ONCE
Status: COMPLETED | OUTPATIENT
Start: 2022-01-27 | End: 2022-01-27

## 2022-01-27 RX ADMIN — IBUPROFEN 600 MG: 600 TABLET ORAL at 15:39

## 2022-01-27 RX ADMIN — DICYCLOMINE HYDROCHLORIDE 20 MG: 10 CAPSULE ORAL at 15:39

## 2022-01-27 RX ADMIN — FAMOTIDINE 20 MG: 20 TABLET ORAL at 15:39

## 2022-01-27 RX ADMIN — ALUMINUM HYDROXIDE AND MAGNESIUM HYDROXIDE 30 ML: 200; 200 SUSPENSION ORAL at 15:39

## 2022-01-27 NOTE — ED TRIAGE NOTES
Pt brought to ed with complaints of chest pain x this morning. Pt Appears well. Past Medical History:   Diagnosis Date    Arthritis     Cancer ([address]     Coronary atherosclerosis of native coronary artery     AS DESCRIBED IN CATH CARDIAC CATH IN 9/07 SHOWS 80% DISTAL LAD AND 40% PROXIMAL LAD DISEASE . HE IS ON MEDICAL TREATMENT.  ASA AND B-BLOCKER 3/10 RENAL ARTERY DUPLEX : NL RT RENAL ARTERY WITH < 60% STENOSIS OF LEFT RENAL ARTERY    Diabetes ([address]     Glaucoma     Hypertension     Impotence of organic origin     Malignant neoplasm of prostate ([address]     Other and unspecified hyperlipidemia

## 2022-01-27 NOTE — ED PROVIDER NOTES
HPI   Patient is a 15-year-old male who presents with chest pain. He states that he woke up this morning and experiencing mid chest pain. He is unsure how to to further describe it. It does not radiate anywhere. Is not short makes it better or worse. He did not mention this until around noon when he told the nurse about it expecting to get a Tylenol. He got Tylenol ate the food and it did not get any better and therefore he came to the emergency department. Denies any shortness of breath. He is a chronic cough has been unchanged. Denies any recent runny nose, sore throat. Has not had any swelling of lower extremities, denies any history of blood clots. Is not sure what medications he takes. He feels the pain more to the right side of his chest.  Denies any trauma or injury. Nonpleuritic and nonexertional in nature. Past Medical History:   Diagnosis Date    Arthritis     Cancer ([address]     Coronary atherosclerosis of native coronary artery     AS DESCRIBED IN CATH CARDIAC CATH IN 9/07 SHOWS 80% DISTAL LAD AND 40% PROXIMAL LAD DISEASE . HE IS ON MEDICAL TREATMENT.  ASA AND B-BLOCKER 3/10 RENAL ARTERY DUPLEX : NL RT RENAL ARTERY WITH < 60% STENOSIS OF LEFT RENAL ARTERY    Diabetes ([address]     Glaucoma     Hypertension     Impotence of organic origin     Malignant neoplasm of prostate ([address]     Other and unspecified hyperlipidemia        Past Surgical History:   Procedure Laterality Date    COLONOSCOPY N/A 3/16/2019    COLONOSCOPY w/ polypectomies [facility] by Shalini Borden [facility]  3/16/2019         COLONOSCOPY,MYRON Hernandez  3/16/2019              Family History:   Problem Relation Age of Onset    Diabetes Other     Heart Disease Other        Social History     Socioeconomic History    Marital status:      Spouse name: Not on file    Number of children: Not on file    Years of education: Not on file    Highest education level: Not on file Occupational History    Not on file   Tobacco Use    Smoking status: Never Smoker    Smokeless tobacco: Never Used   Substance and Sexual Activity    Alcohol use: No    Drug use: Not on file    Sexual activity: Not on file   Other Topics Concern    Not on file   Social History Narrative    Not on file     Social Determinants of Health     Financial Resource Strain:     Difficulty of Paying Living Expenses: Not on file   Food Insecurity:     Worried About Running Out of Food in the Last Year: Not on file    Fran of Food in the Last Year: Not on file   Transportation Needs:     Lack of Transportation (Medical): Not on file    Lack of Transportation (Non-Medical):  Not on file   Physical Activity:     Days of Exercise per Week: Not on file    Minutes of Exercise per Session: Not on file   Stress:     Feeling of Stress : Not on file   Social Connections:     Frequency of Communication with Friends and Family: Not on file    Frequency of Social Gatherings with Friends and Family: Not on file    Attends Quaker Services: Not on file    Active Member of [address] [place] [address] or Organizations: Not on file    Attends Club or Organization Meetings: Not on file    Marital Status: Not on file   Intimate Partner Violence:     Fear of Current or Ex-Partner: Not on file    Emotionally Abused: Not on file    Physically Abused: Not on file    Sexually Abused: Not on file   Housing Stability:     Unable to Pay for Housing in the Last Year: Not on file    Number of [place] in the Last Year: Not on file    Unstable Housing in the Last Year: Not on file         ALLERGIES: Memantine    Review of Systems  Constitutional: No fever  HENT: No ear pain  Eyes: No change in vision  Respiratory: No SOB  Cardio: Positive for chest pain  GI: No blood in stool  : No hematuria  MSK: No back pain  Skin: No rashes  Neuro: No headache    Vitals:    01/27/22 1241 01/27/22 1242 01/27/22 1244   BP:  (!) 153/69    Resp: 12 12 Temp: 97.9 °F (36.6 °C)     SpO2:  99%    Weight:  79.8 kg (176 lb)    Height:   5' 8\" (1.727 m)            Physical Exam   General: No acute distress  Head: Normocephalic, atraumatic  Psych: Cooperative and alert  Eyes: No scleral icterus, normal conjunctiva  ENT: Moist oral mucosa  Neck: Supple  CV: Regular rate and rhythm, no pitting edema, palpable radial pulses  Pulm: Clear breath sounds bilaterally without any wheezing or rhonchi, normal respiratory rate  GI: Normal bowel sounds, soft, non-tender  MSK: Moves all four extremities  Skin: No rashes  Neuro: Alert and conversive    [facility]    Patient is a 80-year-old male who presents with chest pain. Patient's pain is atypical however would like to a cardiac work-up to rule out ACS. I have a low suspicion for pneumonia or other process however patient request to be tested for Covid and therefore this be sent. I do not suspect PE or dissection. Patient otherwise hemodynamically stable and otherwise appears well. EKG shows a bradycardic sinus rhythm at a rate of 59 bpm.    QRS is narrow, axis is normal, patient is an RR prime noted throughout V1 and V2 3 as well as aVR consistent with a right bundle branch block. This is compared with previous and found to be stable. He has slight dips in his ST segment in 1, 2, V5 and V6, also previously seen. No specific ST elevations or depressions noted. Overall shows no signs of ACS or arrhythmia. Similar to previous EKG. Chest x-ray shows no acute cardiopulmonary process. CBC, CMP, troponin are within normal limits with exception of mild elevated glucose without any anion gap or acidosis. Since his symptoms of going on for approximately 8 hours I do feel that 1 troponin is sufficient to rule out ACS. Low suspicion for cardiac etiology at this time. Covid test is negative. Upon reexamination patient states that he is feeling better.   He was given a GI cocktail with some ibuprofen to help treat his symptoms. Patient stable for discharge at this time. Patient is in agreement with the plan to be discharged at this time. All the patient's questions were answered. Patient was given written instructions on the diagnosis, and states understanding of the plan moving forward. We did discuss important signs and symptoms that should prompt quick return to the emergency department. Disposition: Patient was discharged home in stable condition.   They will follow up with their primary care physician    Prescriptions: None    Diagnosis: Acute chest pain, no diagnosis    Procedures

## 2022-01-28 LAB
ATRIAL RATE: 59 BPM
CALCULATED P AXIS, ECG09: 73 DEGREES
CALCULATED R AXIS, ECG10: -14 DEGREES
CALCULATED T AXIS, ECG11: 62 DEGREES
DIAGNOSIS, 93000: NORMAL
P-R INTERVAL, ECG05: 196 MS
Q-T INTERVAL, ECG07: 440 MS
QRS DURATION, ECG06: 150 MS
QTC CALCULATION (BEZET), ECG08: 435 MS
VENTRICULAR RATE, ECG03: 59 BPM

## 2022-03-18 PROBLEM — N17.9 ACUTE RENAL FAILURE (HCC): Status: ACTIVE | Noted: 2019-09-16

## 2022-03-19 PROBLEM — K62.6 STERCORAL ULCER OF RECTUM: Status: ACTIVE | Noted: 2019-03-16

## 2022-03-19 PROBLEM — K64.8 INTERNAL HEMORRHOIDS: Status: ACTIVE | Noted: 2019-03-16

## 2022-03-19 PROBLEM — K85.90 ACUTE PANCREATITIS: Status: ACTIVE | Noted: 2019-09-02

## 2022-03-19 PROBLEM — I63.9 ACUTE EMBOLIC STROKE (HCC): Status: ACTIVE | Noted: 2019-03-08

## 2022-03-19 PROBLEM — K57.90 DIVERTICULOSIS: Status: ACTIVE | Noted: 2019-03-16

## 2022-03-19 PROBLEM — K92.2 GI BLEED: Status: ACTIVE | Noted: 2019-03-13

## 2022-03-19 PROBLEM — G45.9 TIA (TRANSIENT ISCHEMIC ATTACK): Status: ACTIVE | Noted: 2019-09-15

## 2022-03-20 PROBLEM — I49.3 PVC (PREMATURE VENTRICULAR CONTRACTION): Status: ACTIVE | Noted: 2019-02-23

## 2022-06-16 ENCOUNTER — OFFICE VISIT (OUTPATIENT)
Dept: CARDIOLOGY CLINIC | Age: 87
End: 2022-06-16
Payer: MEDICARE

## 2022-06-16 VITALS
DIASTOLIC BLOOD PRESSURE: 68 MMHG | OXYGEN SATURATION: 100 % | HEART RATE: 74 BPM | SYSTOLIC BLOOD PRESSURE: 118 MMHG | WEIGHT: 164 LBS | HEIGHT: 68 IN | BODY MASS INDEX: 24.86 KG/M2

## 2022-06-16 DIAGNOSIS — I45.2 RBBB (RIGHT BUNDLE BRANCH BLOCK WITH LEFT ANTERIOR FASCICULAR BLOCK): ICD-10-CM

## 2022-06-16 DIAGNOSIS — I10 ESSENTIAL HYPERTENSION: ICD-10-CM

## 2022-06-16 DIAGNOSIS — R00.1 BRADYCARDIA: Primary | ICD-10-CM

## 2022-06-16 PROCEDURE — G8536 NO DOC ELDER MAL SCRN: HCPCS | Performed by: INTERNAL MEDICINE

## 2022-06-16 PROCEDURE — 99214 OFFICE O/P EST MOD 30 MIN: CPT | Performed by: INTERNAL MEDICINE

## 2022-06-16 PROCEDURE — G8510 SCR DEP NEG, NO PLAN REQD: HCPCS | Performed by: INTERNAL MEDICINE

## 2022-06-16 PROCEDURE — G8420 CALC BMI NORM PARAMETERS: HCPCS | Performed by: INTERNAL MEDICINE

## 2022-06-16 PROCEDURE — G8427 DOCREV CUR MEDS BY ELIG CLIN: HCPCS | Performed by: INTERNAL MEDICINE

## 2022-06-16 PROCEDURE — 1123F ACP DISCUSS/DSCN MKR DOCD: CPT | Performed by: INTERNAL MEDICINE

## 2022-06-16 PROCEDURE — 1101F PT FALLS ASSESS-DOCD LE1/YR: CPT | Performed by: INTERNAL MEDICINE

## 2022-06-16 NOTE — PROGRESS NOTES
Rogelio Levy    Post hospital TIA, HTN, h/o CAD    HPI    Rogelio Levy is a 80 y.o. referred for HTN and low HRs. I personally obtained and reviewed available records. I see pt was last hospitalized in 3/2019 for a LGIB/ acute blood loss anemia. He has some remote history of CAD but hasn't been seen by a cardiologist in years. I was able to find an echocardiogram report from 2009 with normal LV function 65%, no significant valvular pathology but abnormal diastolic parameters. More recently he was hospitalized [facility] 4/6/19 for acute mental status change: He had an echo that was normal. There was questionable AV block in route by EMS but during hospitalization only sinus lilia documented. Pt himself has dementia, had an acute CVA (embolic suspected) as has known moderate carotid stenosis. Pt was discharged from the hospital  and came straight to my office last time. I personally obtained and reviewed his medical records and see that he presented with mental status changes and was found to have a TIA. Overall he was monitored closely and discharge held today as his blood pressures continued to be somewhat uncontrolled. Medications were finally adjusted and he was discharged in stable condition this morning. His blood pressure in my office is much better in my office and he has no complaints- denies CP SOB, palps etc.    They have been and again he has no complaints today- except says he coughs and has to eat soft food or he \"has a problem. \" His BP is elevated but says it was 130s this morning at his facility. Doing well, BP better controlled since last ER visit 12/2019    Past Medical History:   Diagnosis Date    Arthritis     Cancer [facility])     Coronary atherosclerosis of native coronary artery     AS DESCRIBED IN CATH CARDIAC CATH IN 9/07 SHOWS 80% DISTAL LAD AND 40% PROXIMAL LAD DISEASE . HE IS ON MEDICAL TREATMENT.  ASA AND B-BLOCKER 3/10 RENAL ARTERY DUPLEX : NL RT RENAL ARTERY WITH < 60% STENOSIS OF LEFT RENAL ARTERY    [facility] [address] [address]     Glaucoma     Hypertension     Impotence of organic origin     Malignant neoplasm of prostate ([facility] [address]     Other and unspecified hyperlipidemia        Past Surgical History:   Procedure Laterality Date    COLONOSCOPY N/A 3/16/2019    COLONOSCOPY w/ polypectomies performed by [facility]rely Pelaez [facility] at [facility]  3/16/2019         COLONOSCOPY,REMADRIAN SCOTT,JARROD  3/16/2019            Current Outpatient Medications   Medication Sig Dispense Refill    metFORMIN (GLUCOPHAGE) 500 mg tablet Take  by mouth two (2) times daily (with meals).  valsartan (DIOVAN) 160 mg tablet Take  by mouth daily.  cloNIDine (CATAPRES) 0.3 mg/24 hr 1 Patch by TransDERmal route every seven (7) days. 4 Patch 0    acetaminophen (TYLENOL) 325 mg tablet Take 1 Tab by mouth every four (4) hours as needed for Pain. 12 Tab 0    benzonatate (TESSALON) 100 mg capsule Take 1 Cap by mouth three (3) times daily as needed for Cough. 12 Cap 0    alfuzosin SR (UROXATRAL) 10 mg SR tablet Take 1 Tab by mouth nightly as needed for Other (prostate). 15 Tab 0    ferrous sulfate (FEOSOL) 325 mg (65 mg iron) tablet Take 1 Tab by mouth two (2) times a day. for anemia 30 Tab 0    fexofenadine (ALLEGRA) 60 mg tablet Take 1 Tab by mouth two (2) times a day. 30 Tab 0    labetalol (NORMODYNE) 100 mg tablet Take 1 Tab by mouth every eight (8) hours. 45 Tab 0    NIFEdipine ER (PROCARDIA XL) 30 mg ER tablet Take 1 Tab by mouth daily. 15 Tab 0    lactulose (CHRONULAC) 10 gram/15 mL solution Take 45 mL by mouth three (3) times daily as needed (constipation). 1 Bottle 0    rivastigmine tartrate (EXELON) 1.5 mg capsule Take 1 Cap by mouth two (2) times daily (with meals). 30 Cap 0    polyethylene glycol (MIRALAX) 17 gram/dose powder Take 17 g by mouth daily. 255 g 0    simvastatin (ZOCOR) 40 mg tablet Take 1 Tab by mouth nightly.  15 Tab 0    aspirin 81 mg chewable tablet Take 1 Tab by mouth daily. 15 Tab 0    hydrALAZINE (APRESOLINE) 50 mg tablet Take 1 Tab by mouth three (3) times daily. 45 Tab 0       Allergies   Allergen Reactions    Memantine Other (comments)     nightmares       Social History     Socioeconomic History    Marital status:      Spouse name: Not on file    Number of children: Not on file    Years of education: Not on file    Highest education level: Not on file   Occupational History    Not on file   Tobacco Use    Smoking status: Never Smoker    Smokeless tobacco: Never Used   Substance and Sexual Activity    Alcohol use: No    Drug use: Not on file    Sexual activity: Not on file   Other Topics Concern    Not on file   Social History Narrative    Not on file     Social Determinants of Health     Financial Resource Strain:     Difficulty of Paying Living Expenses: Not on file   Food Insecurity:     Worried About Running Out of Food in the Last Year: Not on file    Fran of Food in the Last Year: Not on file   Transportation Needs:     Lack of Transportation (Medical): Not on file    Lack of Transportation (Non-Medical):  Not on file   Physical Activity:     Days of Exercise per Week: Not on file    Minutes of Exercise per Session: Not on file   Stress:     Feeling of Stress : Not on file   Social Connections:     Frequency of Communication with Friends and Family: Not on file    Frequency of Social Gatherings with Friends and Family: Not on file    Attends [employer] Services: Not on file    Active Member of Clubs or Organizations: Not on file    Attends Club or Organization Meetings: Not on file    Marital Status: Not on file   Intimate Partner Violence:     Fear of Current or Ex-Partner: Not on file    Emotionally Abused: Not on file    Physically Abused: Not on file    Sexually Abused: Not on file   Housing Stability:     Unable to Pay for Housing in the Last Year: Not on file    Number of [facility] in the Last Year: Not on file    Unstable Housing in the Last Year: Not on file        FH: n/a    Review of Systems    14 pt Review of Systems is negative unless otherwise mentioned in the HPI. Wt Readings from Last 3 Encounters:   06/16/22 74.4 kg (164 lb)   01/27/22 79.8 kg (176 lb)   12/16/21 79.8 kg (176 lb)     Temp Readings from Last 3 Encounters:   01/27/22 97.9 °F (36.6 °C)   09/18/19 97.5 °F (36.4 °C)   04/18/19 97.4 °F (36.3 °C)     BP Readings from Last 3 Encounters:   06/16/22 118/68   01/27/22 (!) 153/69   12/16/21 134/60     Pulse Readings from Last 3 Encounters:   06/16/22 74   12/16/21 71   06/30/21 64     Physical Exam:    Visit Vitals  /68 (BP 1 Location: Right arm, BP Patient Position: Sitting)   Pulse 74   Ht 5' 8\" (1.727 m)   Wt 74.4 kg (164 lb)   SpO2 100%   BMI 24.94 kg/m²      Physical Exam  Constitutional:       Appearance: He is well-developed. HENT:      Head: Normocephalic and atraumatic. Eyes:      General: No scleral icterus. Pupils: Pupils are equal, round, and reactive to light. Neck:      Vascular: No JVD. Cardiovascular:      Rate and Rhythm: Normal rate and regular rhythm. Heart sounds: Normal heart sounds. No murmur heard. No friction rub. No gallop. Pulmonary:      Effort: Pulmonary effort is normal. No respiratory distress. Breath sounds: Normal breath sounds. No wheezing or rales. Chest:      Chest wall: No tenderness. Abdominal:      General: Bowel sounds are normal.      Palpations: Abdomen is soft. Skin:     General: Skin is warm and dry. Findings: No rash. Neurological:      Mental Status: He is alert and oriented to person, place, and time.          EKG today: 60 bpm Second degree AVB (Mobitz type II), Wide QRS/ RBBB appearance    Impression and Plan:  Charity Dunham is a 80 y.o. with:    1.) Sinus bradycardia, with suspected intermittent AVB  2.) Recent TIA/ CVA  3.) Carotid disease  4.) Remote CAD, details unknown  5.) Normal LV function  6.) Dementia/ advanced age  9.) RBBB    1.) No further changes to medications today as they were just adjusted and doing well/ HR stable/ asymptomatic  2.) RTC 6 months routine follow up per family request    Does have some intermittent conduction system disease, trying to avoid PPM  Doesn't seem necessary regardless at this point  BP stable  35 mins    Thank you for allowing me to participate in the care of your patient, please do not hesitate to call with questions or concerns.     Kindest Regards,    Kalpana Pal, DO

## 2022-06-16 NOTE — PROGRESS NOTES
Kaylyn Torres presents today for No chief complaint on file. Kaylyn Torres preferred language for health care discussion is english/other. Is someone accompanying this pt? Son    Is the patient using any DME equipment during [address]? walk  Depression Screening:  3 most recent PHQ Screens 12/16/2021   Little interest or pleasure in doing things Not at all   Feeling down, depressed, irritable, or hopeless Not at all   Total Score PHQ 2 0       Learning Assessment:  Learning Assessment 6/30/2021   PRIMARY LEARNER Patient   PRIMARY LANGUAGE ENGLISH   LEARNER PREFERENCE PRIMARY DEMONSTRATION   ANSWERED BY patient   RELATIONSHIP SELF       Abuse Screening:  Abuse Screening Questionnaire 12/16/2021   Do you ever feel afraid of your partner? N   Are you in a relationship with someone who physically or mentally threatens you? N   Is it safe for you to go home? Y       Fall Risk  Fall Risk Assessment, last 12 mths 12/16/2021   Able to walk? Yes   Fall in past 12 months? 0   Do you feel unsteady? 0   Are you worried about falling 0   Number of falls in past 12 months -       Pt currently taking Anticoagulant therapy? ASA 81 mg daily    Coordination of Care:  1. Have you been to the ER, urgent care clinic since your last visit? Hospitalized since your last visit? no    2. Have you seen or consulted any other health care providers outside of the [address] [place] [address] since your last visit? Include any pap smears or colon screening.  no

## 2022-12-14 ENCOUNTER — OFFICE VISIT (OUTPATIENT)
Dept: CARDIOLOGY CLINIC | Age: 87
End: 2022-12-14
Payer: MEDICARE

## 2022-12-14 VITALS
WEIGHT: 166 LBS | OXYGEN SATURATION: 100 % | BODY MASS INDEX: 25.16 KG/M2 | DIASTOLIC BLOOD PRESSURE: 78 MMHG | HEIGHT: 68 IN | SYSTOLIC BLOOD PRESSURE: 162 MMHG | HEART RATE: 67 BPM

## 2022-12-14 DIAGNOSIS — I45.2 RBBB (RIGHT BUNDLE BRANCH BLOCK WITH LEFT ANTERIOR FASCICULAR BLOCK): Primary | ICD-10-CM

## 2022-12-14 DIAGNOSIS — R00.1 BRADYCARDIA: ICD-10-CM

## 2022-12-14 DIAGNOSIS — I10 ESSENTIAL HYPERTENSION: ICD-10-CM

## 2022-12-14 PROCEDURE — 99214 OFFICE O/P EST MOD 30 MIN: CPT | Performed by: INTERNAL MEDICINE

## 2022-12-14 PROCEDURE — G8427 DOCREV CUR MEDS BY ELIG CLIN: HCPCS | Performed by: INTERNAL MEDICINE

## 2022-12-14 PROCEDURE — G8510 SCR DEP NEG, NO PLAN REQD: HCPCS | Performed by: INTERNAL MEDICINE

## 2022-12-14 PROCEDURE — G8417 CALC BMI ABV UP PARAM F/U: HCPCS | Performed by: INTERNAL MEDICINE

## 2022-12-14 PROCEDURE — 1101F PT FALLS ASSESS-DOCD LE1/YR: CPT | Performed by: INTERNAL MEDICINE

## 2022-12-14 PROCEDURE — G8536 NO DOC ELDER MAL SCRN: HCPCS | Performed by: INTERNAL MEDICINE

## 2022-12-14 PROCEDURE — 1123F ACP DISCUSS/DSCN MKR DOCD: CPT | Performed by: INTERNAL MEDICINE

## 2022-12-14 NOTE — PROGRESS NOTES
Debra Merlos presents today for   Chief Complaint   Patient presents with    Follow-up     6 month follow up       Debra Merlos preferred language for health care discussion is english/other. Is someone accompanying this pt? yes    Is the patient using any DME equipment during [address]? walker    Depression Screening:  3 most recent PHQ Screens 12/14/2022   Little interest or pleasure in doing things Not at all   Feeling down, depressed, irritable, or hopeless Not at all   Total Score PHQ 2 0       Learning Assessment:  Learning Assessment 12/14/2022   PRIMARY LEARNER Patient   PRIMARY LANGUAGE ENGLISH   LEARNER PREFERENCE PRIMARY DEMONSTRATION   ANSWERED BY patient   RELATIONSHIP SELF       Abuse Screening:  Abuse Screening Questionnaire 12/14/2022   Do you ever feel afraid of your partner? N   Are you in a relationship with someone who physically or mentally threatens you? N   Is it safe for you to go home? Y       Fall Risk  Fall Risk Assessment, last 12 mths 12/14/2022   Able to walk? Yes   Fall in past 12 months? 0   Do you feel unsteady? 0   Are you worried about falling 0   Is TUG test greater than 12 seconds? -   Is the gait abnormal? -   Number of falls in past 12 months -   Fall with injury? -           Pt currently taking Anticoagulant therapy? no    Pt currently taking Antiplatelet therapy ? ASA 81 mg once a day - It is on patient's medication list      Coordination of Care:  1. Have you been to the ER, urgent care clinic since your last visit? Hospitalized since your last visit? no    2. Have you seen or consulted any other health care providers outside of the [address] [place] [address] since your last visit? Include any pap smears or colon screening.  no

## 2022-12-14 NOTE — PROGRESS NOTES
Sin Machuca  TIA, HTN, h/o CAD    HPI    Sin Machuca is a 80 y.o. referred for HTN and low HRs. I personally obtained and reviewed available records. I see pt was last hospitalized in 3/2019 for a LGIB/ acute blood loss anemia. He has some remote history of CAD but hasn't been seen by a cardiologist in years. I was able to find an echocardiogram report from 2009 with normal LV function 65%, no significant valvular pathology but abnormal diastolic parameters. More recently he was hospitalized [facility] 4/6/19 for acute mental status change: He had an echo that was normal. There was questionable AV block in route by EMS but during hospitalization only sinus lilia documented. Pt himself has dementia, had an acute CVA (embolic suspected) as has known moderate carotid stenosis. Pt was discharged from the hospital  and came straight to my office last time. I personally obtained and reviewed his medical records and see that he presented with mental status changes and was found to have a TIA. Overall he was monitored closely and discharge held today as his blood pressures continued to be somewhat uncontrolled. Medications were finally adjusted and he was discharged in stable condition this morning. His blood pressure in my office is much better in my office and he has no complaints- denies CP SOB, palps etc.    They have been and again he has no complaints today- except says he coughs and has to eat soft food or he \"has a problem. \" His BP is elevated but says it was 130s this morning at his facility. Doing well, BP better controlled since last ER visit 12/2019    Past Medical History:   Diagnosis Date    Arthritis     Cancer [facility])     Coronary atherosclerosis of native coronary artery     AS DESCRIBED IN CATH CARDIAC CATH IN 9/07 SHOWS 80% DISTAL LAD AND 40% PROXIMAL LAD DISEASE . HE IS ON MEDICAL TREATMENT.  ASA AND B-BLOCKER 3/10 RENAL ARTERY DUPLEX : NL RT RENAL ARTERY WITH < 60% STENOSIS OF LEFT RENAL ARTERY    Diabetes ([facility] [address]     Glaucoma     Hypertension     Impotence of organic origin     Malignant neoplasm of prostate ([facility] [address]     Other and unspecified hyperlipidemia        Past Surgical History:   Procedure Laterality Date    COLONOSCOPY N/A 3/16/2019    COLONOSCOPY w/ polypectomies performed by Kiel Wheeler MD at [address]  3/16/2019         COLONOSCOPY,REMADRIAN HERNANDEZJAVIER,SNARE  3/16/2019            Current Outpatient Medications   Medication Sig Dispense Refill    metFORMIN (GLUCOPHAGE) 500 mg tablet Take 500 mg by mouth two (2) times daily (with meals). valsartan (DIOVAN) 160 mg tablet Take 160 mg by mouth daily. cloNIDine (CATAPRES) 0.3 mg/24 hr 1 Patch by TransDERmal route every seven (7) days. 4 Patch 0    acetaminophen (TYLENOL) 325 mg tablet Take 1 Tab by mouth every four (4) hours as needed for Pain. 12 Tab 0    benzonatate (TESSALON) 100 mg capsule Take 1 Cap by mouth three (3) times daily as needed for Cough. 12 Cap 0    alfuzosin SR (UROXATRAL) 10 mg SR tablet Take 1 Tab by mouth nightly as needed for Other (prostate). 15 Tab 0    ferrous sulfate (FEOSOL) 325 mg (65 mg iron) tablet Take 1 Tab by mouth two (2) times a day. for anemia 30 Tab 0    fexofenadine (ALLEGRA) 60 mg tablet Take 1 Tab by mouth two (2) times a day. 30 Tab 0    labetalol (NORMODYNE) 100 mg tablet Take 1 Tab by mouth every eight (8) hours. 45 Tab 0    NIFEdipine ER (PROCARDIA XL) 30 mg ER tablet Take 1 Tab by mouth daily. 15 Tab 0    lactulose (CHRONULAC) 10 gram/15 mL solution Take 45 mL by mouth three (3) times daily as needed (constipation). 1 Bottle 0    rivastigmine tartrate (EXELON) 1.5 mg capsule Take 1 Cap by mouth two (2) times daily (with meals). 30 Cap 0    polyethylene glycol (MIRALAX) 17 gram/dose powder Take 17 g by mouth daily. 255 g 0    simvastatin (ZOCOR) 40 mg tablet Take 1 Tab by mouth nightly.  15 Tab 0    aspirin 81 mg chewable tablet Take 1 Tab by mouth daily. 15 Tab 0    hydrALAZINE (APRESOLINE) 50 mg tablet Take 1 Tab by mouth three (3) times daily. 45 Tab 0       Allergies   Allergen Reactions    Memantine Other (comments)     nightmares       Social History     Socioeconomic History    Marital status:      Spouse name: Not on file    Number of children: Not on file    Years of education: Not on file    Highest education level: Not on file   Occupational History    Not on file   Tobacco Use    Smoking status: Never    Smokeless tobacco: Never   Vaping Use    Vaping Use: Never used   Substance and Sexual Activity    Alcohol use: No    Drug use: Never    Sexual activity: Not Currently   Other Topics Concern    Not on file   Social History Narrative    Not on file     Social Determinants of Health     Financial Resource Strain: Not on file   Food Insecurity: Not on file   Transportation Needs: Not on file   Physical Activity: Not on file   Stress: Not on file   Social Connections: Not on file   Intimate Partner Violence: Not on file   Housing Stability: Not on file        FH: n/a    Review of Systems    14 pt Review of Systems is negative unless otherwise mentioned in the HPI. Wt Readings from Last 3 Encounters:   12/14/22 75.3 kg (166 lb)   06/16/22 74.4 kg (164 lb)   01/27/22 79.8 kg (176 lb)     Temp Readings from Last 3 Encounters:   01/27/22 97.9 °F (36.6 °C)   09/18/19 97.5 °F (36.4 °C)   04/18/19 97.4 °F (36.3 °C)     BP Readings from Last 3 Encounters:   12/14/22 (!) 162/78   06/16/22 118/68   01/27/22 (!) 153/69     Pulse Readings from Last 3 Encounters:   12/14/22 67   06/16/22 74   12/16/21 71     Physical Exam:    Visit Vitals  BP (!) 162/78 (BP 1 Location: Right arm, BP Patient Position: Sitting, BP Cuff Size: Small adult)   Pulse 67   Ht 5' 8\" (1.727 m)   Wt 75.3 kg (166 lb)   SpO2 100%   BMI 25.24 kg/m²      Physical Exam  Constitutional:       Appearance: He is well-developed. HENT:      Head: Normocephalic and atraumatic.    Eyes: General: No scleral icterus. Pupils: Pupils are equal, round, and reactive to light. Neck:      Vascular: No JVD. Cardiovascular:      Rate and Rhythm: Normal rate and regular rhythm. Heart sounds: Normal heart sounds. No murmur heard. No friction rub. No gallop. Pulmonary:      Effort: Pulmonary effort is normal. No respiratory distress. Breath sounds: Normal breath sounds. No wheezing or rales. Chest:      Chest wall: No tenderness. Abdominal:      General: Bowel sounds are normal.      Palpations: Abdomen is soft. Skin:     General: Skin is warm and dry. Findings: No rash. Neurological:      Mental Status: He is alert and oriented to person, place, and time. EKG today: 60 bpm Second degree AVB (Mobitz type II), Wide QRS/ RBBB appearance    Impression and Plan:  Shahzad Mccann is a 80 y.o. with:    1.) Sinus bradycardia, with suspected intermittent AVB  2.) Recent TIA/ CVA  3.) Carotid disease  4.) Remote CAD, details unknown  5.) Normal LV function  6.) Dementia/ advanced age  9.) RBBB    1.) No further changes to medications today as they were just adjusted and doing well/ HR stable/ asymptomatic  2.) RTC 6 months routine follow up per family request    Does have some intermittent conduction system disease, trying to avoid PPM  Doesn't seem necessary regardless at this point  BP stable  35 mins    Thank you for allowing me to participate in the care of your patient, please do not hesitate to call with questions or concerns.     Kindest Regards,    Constance Rossi, DO

## 2022-12-19 NOTE — PROGRESS NOTES
PT DAILY TREATMENT NOTE - [facility]     Patient Name: Cristi Edward  Date:2018  : 3/11/1929  [x]  Patient  Verified  Payor: [employer] / Plan: [place] & B / Product Type: Medicare /    In time:11:30  Out time:12:20  Total Treatment Time (min): 50  Total Timed Codes (min): 50  1:1 Treatment Time ([address] only): 35   Visit #: 5 of 8    Treatment Area: Unsteadiness on feet [R26.81]  Other abnormalities of gait and mobility [R26.89]    SUBJECTIVE  Pain Level (0-10 scale): 0/10  Any medication changes, allergies to medications, adverse drug reactions, diagnosis change, or new procedure performed?: [x] No    [] Yes (see summary sheet for update)  Subjective functional status/changes:   [x] No changes reported    OBJECTIVE    35 min Therapeutic Exercise:  [x] See flow sheet :   Rationale: increase ROM and increase strength to improve the patients ability to perform ADL's. 15 min Neuromuscular Re-education:  [x]  See flow sheet : with gait belt. Rationale: increase strength, improve balance and increase proprioception  to improve the patients ability to perform functional activities. With   [x] TE   [] TA   [] neuro   [] other: Patient Education: [x] Review HEP    [] Progressed/Changed HEP based on:   [] positioning   [] body mechanics   [] transfers   [] heat/ice application    [] other:      Other Objective/Functional Measures: Sit to stand from chair 7x in 30\". Pain Level (0-10 scale) post treatment: 0/10    ASSESSMENT/Changes in Function: FOTO 71%. Pt denied vertigo symptoms during treatment. Significant trunk sway when attempting modified tandem on airex EC, instructed to perform EO. Patient will continue to benefit from skilled PT services to modify and progress therapeutic interventions, address functional mobility deficits, address ROM deficits, address strength deficits, analyze and cue movement patterns and address imbalance/dizziness to attain remaining goals. [x]  See Plan of Care  []  See progress note/recertification  []  See Discharge Summary         Progress towards goals / Updated goals:  Short Term Goals: To be accomplished in 2 weeks:                        6. The patient will be independent and compliant with HEP to maximize therapeutic benefit. - Pt reports HEP compliance. 1/2/2018  Long Term Goals: To be accomplished in 4 weeks:                        1. The patient will improve FOTO score to 66 lumbar in order to better improve quality of life. - 71%. 1/16/2018                        2. The patient will display TUG of 20\" without AD in order to improve efficiency of home ambulation. - not met TUG 32 \". 1/11/18                        3. The patient will improve DGI to 14/24 in order to reduce falls risk.                        4. The patient will demonstrate sit to stand 30\" chair rise test to 8 repetitions in order to maximize efficiency of transfers in the home. - Sit to stand from chair 7x in 30\".  1/16/2018    PLAN  []  Upgrade activities as tolerated     [x]  Continue plan of care  []  Update interventions per flow sheet       []  Discharge due to:_  []  Other:_      Deedee Frankel, PTA 1/16/2018  11:26 AM    Future Appointments  Date Time Provider Kaleigh Rueda   1/16/2018 11:30 AM Deedee Frankel, PTA [facility]   1/18/2018 11:30 AM Forest Vences PTA [facility]   1/23/2018 10:30 AM Praveena Rosado  [address]   1/24/2018 11:30 AM Jered Blakely PT [facility]   1/25/2018 11:30 AM Forest Vences PTA [facility] Imiquimod Counseling:  I discussed with the patient the risks of imiquimod including but not limited to erythema, scaling, itching, weeping, crusting, and pain.  Patient understands that the inflammatory response to imiquimod is variable from person to person and was educated regarded proper titration schedule.  If flu-like symptoms develop, patient knows to discontinue the medication and contact us.

## 2023-01-01 ENCOUNTER — APPOINTMENT (OUTPATIENT)
Facility: HOSPITAL | Age: 88
End: 2023-01-01
Attending: STUDENT IN AN ORGANIZED HEALTH CARE EDUCATION/TRAINING PROGRAM
Payer: MEDICARE

## 2023-01-01 ENCOUNTER — TELEPHONE (OUTPATIENT)
Age: 88
End: 2023-01-01

## 2023-01-01 ENCOUNTER — APPOINTMENT (OUTPATIENT)
Facility: HOSPITAL | Age: 88
End: 2023-01-01
Payer: MEDICARE

## 2023-01-01 ENCOUNTER — HOSPITAL ENCOUNTER (EMERGENCY)
Facility: HOSPITAL | Age: 88
Discharge: HOME | End: 2023-06-18
Attending: STUDENT IN AN ORGANIZED HEALTH CARE EDUCATION/TRAINING PROGRAM
Payer: MEDICARE

## 2023-01-01 ENCOUNTER — HOSPITAL ENCOUNTER (EMERGENCY)
Facility: HOSPITAL | Age: 88
End: 2023-06-21
Attending: EMERGENCY MEDICINE
Payer: MEDICARE

## 2023-01-01 VITALS
SYSTOLIC BLOOD PRESSURE: 138 MMHG | RESPIRATION RATE: 14 BRPM | HEART RATE: 52 BPM | OXYGEN SATURATION: 98 % | TEMPERATURE: 97.4 F | DIASTOLIC BLOOD PRESSURE: 65 MMHG

## 2023-01-01 VITALS
DIASTOLIC BLOOD PRESSURE: 47 MMHG | WEIGHT: 166.01 LBS | RESPIRATION RATE: 15 BRPM | OXYGEN SATURATION: 98 % | BODY MASS INDEX: 25.24 KG/M2 | SYSTOLIC BLOOD PRESSURE: 68 MMHG | TEMPERATURE: 95.8 F

## 2023-01-01 DIAGNOSIS — R22.31 ELBOW MASS, RIGHT: ICD-10-CM

## 2023-01-01 DIAGNOSIS — M25.521 RIGHT ELBOW PAIN: Primary | ICD-10-CM

## 2023-01-01 DIAGNOSIS — M71.121 SEPTIC BURSITIS OF ELBOW, RIGHT: ICD-10-CM

## 2023-01-01 DIAGNOSIS — G45.9 TIA (TRANSIENT ISCHEMIC ATTACK): Primary | ICD-10-CM

## 2023-01-01 LAB
ALBUMIN SERPL-MCNC: 2.2 G/DL (ref 3.4–5)
ALBUMIN SERPL-MCNC: 2.2 G/DL (ref 3.4–5)
ALBUMIN/GLOB SERPL: 0.6 (ref 0.8–1.7)
ALBUMIN/GLOB SERPL: 0.6 (ref 0.8–1.7)
ALP SERPL-CCNC: 147 U/L (ref 45–117)
ALP SERPL-CCNC: 218 U/L (ref 45–117)
ALT SERPL-CCNC: 132 U/L (ref 16–61)
ALT SERPL-CCNC: 224 U/L (ref 16–61)
ANION GAP SERPL CALC-SCNC: 5 MMOL/L (ref 3–18)
ANION GAP SERPL CALC-SCNC: 5 MMOL/L (ref 3–18)
AST SERPL-CCNC: 212 U/L (ref 10–38)
AST SERPL-CCNC: 99 U/L (ref 10–38)
BASOPHILS # BLD: 0 K/UL (ref 0–0.1)
BASOPHILS # BLD: 0 K/UL (ref 0–0.1)
BASOPHILS NFR BLD: 0 % (ref 0–2)
BASOPHILS NFR BLD: 0 % (ref 0–2)
BILIRUB SERPL-MCNC: 0.3 MG/DL (ref 0.2–1)
BILIRUB SERPL-MCNC: 0.3 MG/DL (ref 0.2–1)
BUN SERPL-MCNC: 23 MG/DL (ref 7–18)
BUN SERPL-MCNC: 25 MG/DL (ref 7–18)
BUN/CREAT SERPL: 21 (ref 12–20)
BUN/CREAT SERPL: 22 (ref 12–20)
CALCIUM SERPL-MCNC: 8.7 MG/DL (ref 8.5–10.1)
CALCIUM SERPL-MCNC: 8.8 MG/DL (ref 8.5–10.1)
CHLORIDE SERPL-SCNC: 103 MMOL/L (ref 100–111)
CHLORIDE SERPL-SCNC: 105 MMOL/L (ref 100–111)
CO2 SERPL-SCNC: 24 MMOL/L (ref 21–32)
CO2 SERPL-SCNC: 26 MMOL/L (ref 21–32)
CREAT SERPL-MCNC: 1.06 MG/DL (ref 0.6–1.3)
CREAT SERPL-MCNC: 1.18 MG/DL (ref 0.6–1.3)
DIFFERENTIAL METHOD BLD: ABNORMAL
DIFFERENTIAL METHOD BLD: ABNORMAL
EKG ATRIAL RATE: 48 BPM
EKG DIAGNOSIS: NORMAL
EKG P AXIS: 65 DEGREES
EKG P-R INTERVAL: 236 MS
EKG Q-T INTERVAL: 524 MS
EKG QRS DURATION: 158 MS
EKG QTC CALCULATION (BAZETT): 468 MS
EKG R AXIS: -7 DEGREES
EKG T AXIS: 30 DEGREES
EKG VENTRICULAR RATE: 48 BPM
EOSINOPHIL # BLD: 0.1 K/UL (ref 0–0.4)
EOSINOPHIL # BLD: 0.1 K/UL (ref 0–0.4)
EOSINOPHIL NFR BLD: 1 % (ref 0–5)
EOSINOPHIL NFR BLD: 1 % (ref 0–5)
ERYTHROCYTE [DISTWIDTH] IN BLOOD BY AUTOMATED COUNT: 13.2 % (ref 11.6–14.5)
ERYTHROCYTE [DISTWIDTH] IN BLOOD BY AUTOMATED COUNT: 13.2 % (ref 11.6–14.5)
GLOBULIN SER CALC-MCNC: 3.9 G/DL (ref 2–4)
GLOBULIN SER CALC-MCNC: 3.9 G/DL (ref 2–4)
GLUCOSE BLD STRIP.AUTO-MCNC: 157 MG/DL (ref 70–110)
GLUCOSE BLD STRIP.AUTO-MCNC: 207 MG/DL (ref 70–110)
GLUCOSE SERPL-MCNC: 199 MG/DL (ref 74–99)
GLUCOSE SERPL-MCNC: 227 MG/DL (ref 74–99)
HCT VFR BLD AUTO: 29.3 % (ref 36–48)
HCT VFR BLD AUTO: 30.5 % (ref 36–48)
HGB BLD-MCNC: 9.1 G/DL (ref 13–16)
HGB BLD-MCNC: 9.4 G/DL (ref 13–16)
IMM GRANULOCYTES # BLD AUTO: 0.1 K/UL (ref 0–0.04)
IMM GRANULOCYTES # BLD AUTO: 0.1 K/UL (ref 0–0.04)
IMM GRANULOCYTES NFR BLD AUTO: 1 % (ref 0–0.5)
IMM GRANULOCYTES NFR BLD AUTO: 1 % (ref 0–0.5)
INR PPP: 1.2 (ref 0.8–1.2)
INR PPP: 1.2 (ref 0.8–1.2)
LACTATE SERPL-SCNC: 1.7 MMOL/L (ref 0.4–2)
LYMPHOCYTES # BLD: 0.7 K/UL (ref 0.9–3.6)
LYMPHOCYTES # BLD: 1 K/UL (ref 0.9–3.6)
LYMPHOCYTES NFR BLD: 6 % (ref 21–52)
LYMPHOCYTES NFR BLD: 8 % (ref 21–52)
MAGNESIUM SERPL-MCNC: 2.5 MG/DL (ref 1.6–2.6)
MCH RBC QN AUTO: 28.6 PG (ref 24–34)
MCH RBC QN AUTO: 28.7 PG (ref 24–34)
MCHC RBC AUTO-ENTMCNC: 30.8 G/DL (ref 31–37)
MCHC RBC AUTO-ENTMCNC: 31.1 G/DL (ref 31–37)
MCV RBC AUTO: 92.1 FL (ref 78–100)
MCV RBC AUTO: 93 FL (ref 78–100)
MONOCYTES # BLD: 0.8 K/UL (ref 0.05–1.2)
MONOCYTES # BLD: 1.2 K/UL (ref 0.05–1.2)
MONOCYTES NFR BLD: 7 % (ref 3–10)
MONOCYTES NFR BLD: 9 % (ref 3–10)
NEUTS SEG # BLD: 10.5 K/UL (ref 1.8–8)
NEUTS SEG # BLD: 9.8 K/UL (ref 1.8–8)
NEUTS SEG NFR BLD: 81 % (ref 40–73)
NEUTS SEG NFR BLD: 86 % (ref 40–73)
NRBC # BLD: 0 K/UL (ref 0–0.01)
NRBC # BLD: 0 K/UL (ref 0–0.01)
NRBC BLD-RTO: 0 PER 100 WBC
NRBC BLD-RTO: 0 PER 100 WBC
PLATELET # BLD AUTO: 208 K/UL (ref 135–420)
PLATELET # BLD AUTO: 227 K/UL (ref 135–420)
PMV BLD AUTO: 10.8 FL (ref 9.2–11.8)
PMV BLD AUTO: 11.6 FL (ref 9.2–11.8)
POTASSIUM SERPL-SCNC: 5.2 MMOL/L (ref 3.5–5.5)
POTASSIUM SERPL-SCNC: 5.3 MMOL/L (ref 3.5–5.5)
PROCALCITONIN SERPL-MCNC: 0.3 NG/ML
PROT SERPL-MCNC: 6.1 G/DL (ref 6.4–8.2)
PROT SERPL-MCNC: 6.1 G/DL (ref 6.4–8.2)
PROTHROMBIN TIME: 15.4 SEC (ref 11.5–15.2)
PROTHROMBIN TIME: 15.5 SEC (ref 11.5–15.2)
RBC # BLD AUTO: 3.18 M/UL (ref 4.35–5.65)
RBC # BLD AUTO: 3.28 M/UL (ref 4.35–5.65)
SODIUM SERPL-SCNC: 134 MMOL/L (ref 136–145)
SODIUM SERPL-SCNC: 134 MMOL/L (ref 136–145)
TROPONIN I SERPL HS-MCNC: 18 NG/L (ref 0–78)
TROPONIN I SERPL HS-MCNC: 19 NG/L (ref 0–78)
WBC # BLD AUTO: 11.4 K/UL (ref 4.6–13.2)
WBC # BLD AUTO: 12.9 K/UL (ref 4.6–13.2)

## 2023-01-01 PROCEDURE — 87205 SMEAR GRAM STAIN: CPT

## 2023-01-01 PROCEDURE — 99285 EMERGENCY DEPT VISIT HI MDM: CPT

## 2023-01-01 PROCEDURE — 85025 COMPLETE CBC W/AUTO DIFF WBC: CPT

## 2023-01-01 PROCEDURE — 73200 CT UPPER EXTREMITY W/O DYE: CPT

## 2023-01-01 PROCEDURE — 71045 X-RAY EXAM CHEST 1 VIEW: CPT

## 2023-01-01 PROCEDURE — 84484 ASSAY OF TROPONIN QUANT: CPT

## 2023-01-01 PROCEDURE — 96374 THER/PROPH/DIAG INJ IV PUSH: CPT

## 2023-01-01 PROCEDURE — 87147 CULTURE TYPE IMMUNOLOGIC: CPT

## 2023-01-01 PROCEDURE — 82962 GLUCOSE BLOOD TEST: CPT

## 2023-01-01 PROCEDURE — 73070 X-RAY EXAM OF ELBOW: CPT

## 2023-01-01 PROCEDURE — 2580000003 HC RX 258: Performed by: EMERGENCY MEDICINE

## 2023-01-01 PROCEDURE — 84145 PROCALCITONIN (PCT): CPT

## 2023-01-01 PROCEDURE — 80053 COMPREHEN METABOLIC PANEL: CPT

## 2023-01-01 PROCEDURE — 87040 BLOOD CULTURE FOR BACTERIA: CPT

## 2023-01-01 PROCEDURE — 70450 CT HEAD/BRAIN W/O DYE: CPT

## 2023-01-01 PROCEDURE — 87070 CULTURE OTHR SPECIMN AEROBIC: CPT

## 2023-01-01 PROCEDURE — 6360000004 HC RX CONTRAST MEDICATION: Performed by: EMERGENCY MEDICINE

## 2023-01-01 PROCEDURE — 87186 SC STD MICRODIL/AGAR DIL: CPT

## 2023-01-01 PROCEDURE — 96375 TX/PRO/DX INJ NEW DRUG ADDON: CPT

## 2023-01-01 PROCEDURE — 70498 CT ANGIOGRAPHY NECK: CPT

## 2023-01-01 PROCEDURE — 6370000000 HC RX 637 (ALT 250 FOR IP): Performed by: STUDENT IN AN ORGANIZED HEALTH CARE EDUCATION/TRAINING PROGRAM

## 2023-01-01 PROCEDURE — 87077 CULTURE AEROBIC IDENTIFY: CPT

## 2023-01-01 PROCEDURE — 1100000003 HC PRIVATE W/ TELEMETRY

## 2023-01-01 PROCEDURE — 83605 ASSAY OF LACTIC ACID: CPT

## 2023-01-01 PROCEDURE — 83735 ASSAY OF MAGNESIUM: CPT

## 2023-01-01 PROCEDURE — 6360000002 HC RX W HCPCS: Performed by: EMERGENCY MEDICINE

## 2023-01-01 PROCEDURE — 85610 PROTHROMBIN TIME: CPT

## 2023-01-01 PROCEDURE — 93005 ELECTROCARDIOGRAM TRACING: CPT | Performed by: STUDENT IN AN ORGANIZED HEALTH CARE EDUCATION/TRAINING PROGRAM

## 2023-01-01 PROCEDURE — 93005 ELECTROCARDIOGRAM TRACING: CPT | Performed by: EMERGENCY MEDICINE

## 2023-01-01 PROCEDURE — 93010 ELECTROCARDIOGRAM REPORT: CPT | Performed by: INTERNAL MEDICINE

## 2023-01-01 PROCEDURE — 93971 EXTREMITY STUDY: CPT

## 2023-01-01 RX ORDER — LIDOCAINE 4 G/G
1 PATCH TOPICAL
Status: DISCONTINUED | OUTPATIENT
Start: 2023-01-01 | End: 2023-01-01 | Stop reason: HOSPADM

## 2023-01-01 RX ORDER — SODIUM CHLORIDE, SODIUM LACTATE, POTASSIUM CHLORIDE, AND CALCIUM CHLORIDE .6; .31; .03; .02 G/100ML; G/100ML; G/100ML; G/100ML
30 INJECTION, SOLUTION INTRAVENOUS ONCE
Status: COMPLETED | OUTPATIENT
Start: 2023-01-01 | End: 2023-06-21

## 2023-01-01 RX ORDER — ACETAMINOPHEN 500 MG
500 TABLET ORAL 4 TIMES DAILY PRN
Qty: 15 TABLET | Refills: 1 | Status: SHIPPED | OUTPATIENT
Start: 2023-01-01

## 2023-01-01 RX ORDER — ACETAMINOPHEN 500 MG
1000 TABLET ORAL
Status: COMPLETED | OUTPATIENT
Start: 2023-01-01 | End: 2023-01-01

## 2023-01-01 RX ORDER — IBUPROFEN 600 MG/1
600 TABLET ORAL
Status: COMPLETED | OUTPATIENT
Start: 2023-01-01 | End: 2023-01-01

## 2023-01-01 RX ORDER — LIDOCAINE 4 G/G
1 PATCH TOPICAL DAILY
Qty: 5 EACH | Refills: 0 | Status: SHIPPED | OUTPATIENT
Start: 2023-01-01 | End: 2023-06-22

## 2023-01-01 RX ADMIN — ACETAMINOPHEN 1000 MG: 500 TABLET ORAL at 18:21

## 2023-01-01 RX ADMIN — PIPERACILLIN AND TAZOBACTAM 4500 MG: 4; .5 INJECTION, POWDER, FOR SOLUTION INTRAVENOUS; PARENTERAL at 19:02

## 2023-01-01 RX ADMIN — IBUPROFEN 600 MG: 600 TABLET ORAL at 18:21

## 2023-01-01 RX ADMIN — SODIUM CHLORIDE, POTASSIUM CHLORIDE, SODIUM LACTATE AND CALCIUM CHLORIDE 2400 ML: 600; 310; 30; 20 INJECTION, SOLUTION INTRAVENOUS at 17:18

## 2023-01-01 RX ADMIN — IOPAMIDOL 80 ML: 755 INJECTION, SOLUTION INTRAVENOUS at 16:00

## 2023-01-01 RX ADMIN — Medication 2000 MG: at 19:02

## 2023-05-17 ENCOUNTER — HOSPITAL ENCOUNTER (EMERGENCY)
Facility: HOSPITAL | Age: 88
Discharge: HOME | End: 2023-05-17
Attending: EMERGENCY MEDICINE
Payer: MEDICARE

## 2023-05-17 VITALS
RESPIRATION RATE: 18 BRPM | DIASTOLIC BLOOD PRESSURE: 59 MMHG | HEART RATE: 82 BPM | TEMPERATURE: 97.5 F | HEIGHT: 68 IN | BODY MASS INDEX: 25.16 KG/M2 | SYSTOLIC BLOOD PRESSURE: 112 MMHG | WEIGHT: 166 LBS | OXYGEN SATURATION: 99 %

## 2023-05-17 DIAGNOSIS — Z46.6 ENCOUNTER FOR FOLEY CATHETER REMOVAL: Primary | ICD-10-CM

## 2023-05-17 PROCEDURE — 99283 EMERGENCY DEPT VISIT LOW MDM: CPT

## 2023-05-17 ASSESSMENT — PAIN - FUNCTIONAL ASSESSMENT: PAIN_FUNCTIONAL_ASSESSMENT: NONE - DENIES PAIN

## 2023-05-17 NOTE — ED NOTES
Pt with Hx dementia. Per report staff indicated that he was at his baseline mental status.      Josr Cabrera, RN  05/17/23 [id number]

## 2023-05-17 NOTE — ED PROVIDER NOTES
[facility] EMERGENCY DEPT  eMERGENCY dEPARTMENT eNCOUnter      Pt Name: Rene Macias  MRN: [id number]  Armsmonygfrachel 3/11/1929 of evaluation: 5/17/2023  Provider:Bruno Munguia MD    CHIEF COMPLAINT         HPI    Rene Macias is a 80 y.o. male  who pulled his matthews cath out this evening. He has a history of pulling out his catheter. The nursing home covering physician was called and he told the nursing home to leave the matthews cath out. However the patient was sent to the ER for questionable having a low BP and replacement of the matthews cath. Upon arrival to the ER, the patient's BP was normal.     ROS  Review of Systems   Reason unable to perform ROS: Patient has severe dementia. Except as noted above the remainder of the review of systems was reviewed and negative. PAST MEDICAL HISTORY     Past Medical History:   Diagnosis Date    Arthritis     Cancer [facility])     Coronary atherosclerosis of native coronary artery     AS DESCRIBED IN CATH CARDIAC CATH IN 9/07 SHOWS 80% DISTAL LAD AND 40% PROXIMAL LAD DISEASE . HE IS ON MEDICAL TREATMENT.  ASA AND B-BLOCKER 3/10 RENAL ARTERY DUPLEX : NL RT RENAL ARTERY WITH < 60% STENOSIS OF LEFT RENAL ARTERY    Diabetes ([address]     Glaucoma     Hypertension     Impotence of organic origin     Malignant neoplasm of prostate ([address]     Other and unspecified hyperlipidemia          SURGICAL HISTORY       Past Surgical History:   Procedure Laterality Date    COLONOSCOPY N/A 3/16/2019    COLONOSCOPY w/ polypectomies performed by Brandon Sorto MD at [address] [place] [address]  3/16/2019         COLONOSCOPY,FRANCIS HADLEY  3/16/2019              CURRENTMEDICATIONS       Previous Medications    ACETAMINOPHEN (TYLENOL) 325 MG TABLET    Take 325 mg by mouth every 4 hours as needed    ALFUZOSIN (UROXATRAL) 10 MG EXTENDED RELEASE TABLET    Take 10 mg by mouth    ASPIRIN 81 MG CHEWABLE TABLET    Take 81 mg by mouth daily    BENZONATATE (TESSALON) 100 MG

## 2023-05-17 NOTE — ED TRIAGE NOTES
Pt arrived via EMS, [facility], from [place] after \"pulling out his matthews cath\"

## 2023-05-17 NOTE — ED NOTES
Report called to [facility].  Awaiting transport      Emmanuel Espinosa RN  05/17/23 1943

## 2023-05-18 NOTE — ED NOTES
Patient resting comfortably on the stretcher with call bell in reach. Patient has no signs or symptoms of acute distress at this time.        Tiny Chau RN  05/17/23 8932

## 2023-06-06 ENCOUNTER — HOME HEALTH ADMISSION (OUTPATIENT)
Age: 88
End: 2023-06-06

## 2023-06-08 ENCOUNTER — HOME CARE VISIT (OUTPATIENT)
Age: 88
End: 2023-06-08

## 2023-06-19 NOTE — TELEPHONE ENCOUNTER
Farhan Whaley from [facility] called to schedule a 2 day follow up for patient after being seen in the ED for his right elbow. Please review to determine proper placement and advise Dinorah back at [phone].

## 2023-06-20 NOTE — ED TRIAGE NOTES
Pt via EMS from [facility] with reports of AMS. Per EMS nursing home staff reports patient was normal at [phone] but stopped responding verbally some time after that. BG PTA per . Pt also has a draining abscess to right elbow. ED attending bedside. Code stroke initiated @ 3280.

## 2023-06-20 NOTE — PROGRESS NOTES
ER MD requested for telemetry floor admission. However, patient clinical status changed and he has been more hypothermic required BairHugger. ER MD cancelled telemetry admission and he has requested ICU evaluation for further care. Please call back to hospitalist team if further care is needed.    Signed off     Carol Stokes MD

## 2023-06-20 NOTE — PROGRESS NOTES
Assumed care of patient at this time. Family at bedside states that patient lives in an assistance living facility and he presented to the ED because the patient had stopped speaking. Pt at this time speaking, however, speech somewhat incomprehensible.

## 2023-06-20 NOTE — PROGRESS NOTES
Patient's temperature taken and it was noted to be 91.2. Bear hugger applied to patient and rectal temp prob placed.

## 2023-06-21 LAB
EKG ATRIAL RATE: 416 BPM
EKG DIAGNOSIS: NORMAL
EKG Q-T INTERVAL: 560 MS
EKG QRS DURATION: 174 MS
EKG QTC CALCULATION (BAZETT): 484 MS
EKG R AXIS: -6 DEGREES
EKG T AXIS: 26 DEGREES
EKG VENTRICULAR RATE: 45 BPM

## 2023-06-21 NOTE — ED NOTES
[facility] contacted at this time, voice message left on secure line with callback number, case reference number [id number].      Jude Rm RN  06/20/23 9774

## 2023-06-21 NOTE — ED NOTES
Patient suctioned multiple times by staff, patient coughing up yellow secretions at 2100  Patient became apneic and pulseless at 2105 compressions started, MD, RN, RT at bedside       Collette Renteria, [address] [place] [address]  06/20/23 8860

## 2023-06-21 NOTE — ED PROVIDER NOTES
Emergency Department Encounter  Location:  [facility] EMERGENCY DEPT    Patient: Shayna Nugent  MRN: [id number]  : 3/11/1929  Date of evaluation: 2023  ED Provider: Douglas Richey MD    07:00p.m. Shayna Nugent was checked out to me by Molly Huerta. Please see his initial documentation for details of the patient's initial ED presentation, physical exam and completed studies. In brief, Shayna Nugent is a 80 y.o. male that presented to the emergency department from [facility]. The patient is admitted to the hospitalist service and has sepsis with hypothermia. However at this time he is to be going to the ICU.   The patient has been discussed with the intensivist who recommended the patient continues with the plan to go to the ICU but to the hospitalist team.  At this time,  the patient has declined the hospitalist team since he is critical.    I have reviewed and interpreted all of the currently available lab results and diagnostics from this visit:  Results for orders placed or performed during the hospital encounter of 23   CBC with Auto Differential   Result Value Ref Range    WBC 11.4 4.6 - 13.2 K/uL    RBC 3.18 (L) 4.35 - 5.65 M/uL    Hemoglobin 9.1 (L) 13.0 - 16.0 g/dL    Hematocrit 29.3 (L) 36.0 - 48.0 %    MCV 92.1 78.0 - 100.0 FL    MCH 28.6 24.0 - 34.0 PG    MCHC 31.1 31.0 - 37.0 g/dL    RDW 13.2 11.6 - 14.5 %    Platelets 841 005 - 428 K/uL    MPV 11.6 9.2 - 11.8 FL    Nucleated RBCs 0.0 0  WBC    nRBC 0.00 0.00 - 0.01 K/uL    Neutrophils % 86 (H) 40 - 73 %    Lymphocytes % 6 (L) 21 - 52 %    Monocytes % 7 3 - 10 %    Eosinophils % 1 0 - 5 %    Basophils % 0 0 - 2 %    Immature Granulocytes 1 (H) 0.0 - 0.5 %    Neutrophils Absolute 9.8 (H) 1.8 - 8.0 K/UL    Lymphocytes Absolute 0.7 (L) 0.9 - 3.6 K/UL    Monocytes Absolute 0.8 0.05 - 1.2 K/UL    Eosinophils Absolute 0.1 0.0 - 0.4 K/UL    Basophils Absolute 0.0 0.0 - 0.1 K/UL    Absolute Immature
capsule Take 100 mg by mouth 3 times daily as needed      cloNIDine (CATAPRES) 0.3 MG/24HR PTWK Place 1 patch onto the skin every 7 days      ferrous sulfate (IRON 325) 325 (65 Fe) MG tablet Take 325 mg by mouth 2 times daily      fexofenadine (ALLEGRA) 60 MG tablet Take 60 mg by mouth 2 times daily      hydrALAZINE (APRESOLINE) 50 MG tablet Take 50 mg by mouth 3 times daily      labetalol (NORMODYNE) 100 MG tablet Take 100 mg by mouth in the morning and 100 mg at noon and 100 mg in the evening. lactulose (CHRONULAC) 10 GM/15ML solution Take 45 mLs by mouth 3 times daily as needed      metFORMIN (GLUCOPHAGE) 500 MG tablet Take 500 mg by mouth 2 times daily (with meals)      NIFEdipine (PROCARDIA XL) 30 MG extended release tablet Take 30 mg by mouth daily      polyethylene glycol (GLYCOLAX) 17 GM/SCOOP powder Take 17 g by mouth daily      rivastigmine (EXELON) 1.5 MG capsule Take 1.5 mg by mouth 2 times daily (with meals)      simvastatin (ZOCOR) 40 MG tablet Take 40 mg by mouth      valsartan (DIOVAN) 160 MG tablet Take 160 mg by mouth daily         Past History     Past Medical History:  Past Medical History:   Diagnosis Date    Arthritis     Cancer ([address]     Coronary atherosclerosis of native coronary artery     AS DESCRIBED IN CATH CARDIAC CATH IN 9/07 SHOWS 80% DISTAL LAD AND 40% PROXIMAL LAD DISEASE . HE IS ON MEDICAL TREATMENT.  ASA AND B-BLOCKER 3/10 RENAL ARTERY DUPLEX : NL RT RENAL ARTERY WITH < 60% STENOSIS OF LEFT RENAL ARTERY    Diabetes ([address]     Glaucoma     Hypertension     Impotence of organic origin     Malignant neoplasm of prostate ([address]     Other and unspecified hyperlipidemia        Past Surgical History:  Past Surgical History:   Procedure Laterality Date    COLONOSCOPY N/A 3/16/2019    COLONOSCOPY w/ polypectomies performed by Sumi Sneed MD at [address] [facility]  3/16/2019         COLONOSCOPY,DANIELLE Ye  3/16/2019            Family History:  Family History

## 2023-06-23 LAB
BACTERIA SPEC CULT: ABNORMAL
GRAM STN SPEC: ABNORMAL
GRAM STN SPEC: ABNORMAL
SERVICE CMNT-IMP: ABNORMAL

## 2023-06-25 LAB
BACTERIA SPEC CULT: NORMAL
SERVICE CMNT-IMP: NORMAL

## 2023-06-26 LAB
BACTERIA SPEC CULT: NORMAL
SERVICE CMNT-IMP: NORMAL

## (undated) DEVICE — CATHETER SUCT TR FL TIP 14FR W/ O CTRL

## (undated) DEVICE — CAP ENDOSCP SEAL OD13.4MM DSTL ATTACH REVEAL

## (undated) DEVICE — FORCEPS BX L240CM JAW DIA2.8MM L CAP W/ NDL MIC MESH TOOTH

## (undated) DEVICE — SYRINGE MED 25GA 3ML L5/8IN SUBQ PLAS W/ DETACH NDL SFTY

## (undated) DEVICE — FLUFF AND POLYMER UNDERPAD,EXTRA HEAVY: Brand: WINGS

## (undated) DEVICE — ENDOSCOPY PUMP TUBING/ CAP SET: Brand: ERBE

## (undated) DEVICE — MEDI-VAC NON-CONDUCTIVE SUCTION TUBING: Brand: CARDINAL HEALTH

## (undated) DEVICE — GOWN ISOL IMPERV UNIV, DISP, OPEN BACK, BLUE --

## (undated) DEVICE — AIRLIFE™ NASAL OXYGEN CANNULA CURVED, NONFLARED TIP WITH 14 FOOT (4.3 M) CRUSH-RESISTANT TUBING, OVER-THE-EAR STYLE: Brand: AIRLIFE™

## (undated) DEVICE — SNARE POLYP M W27MMXL240CM OVL STIFF DISP CAPTIVATOR

## (undated) DEVICE — MEDI-VAC SUCTION HIGH CAPACITY: Brand: CARDINAL HEALTH

## (undated) DEVICE — FLEX ADVANTAGE 3000CC: Brand: FLEX ADVANTAGE

## (undated) DEVICE — TRAP SPEC COLL POLYP POLYSTYR --

## (undated) DEVICE — SYR 50ML SLIP TIP NSAF LF STRL --

## (undated) DEVICE — SOLUTION IRRIG 1000ML H2O STRL BLT

## (undated) DEVICE — GAUZE SPONGES,16 PLY: Brand: CURITY

## (undated) DEVICE — SYRINGE MED 20ML STD CLR PLAS LUERLOCK TIP N CTRL DISP

## (undated) DEVICE — SYR 10ML LUER LOK 1/5ML GRAD --

## (undated) DEVICE — (D)GLOVE EXAM LG NITRL NS -- DISC BY MFR NO SUB

## (undated) DEVICE — CANNULA ORIG TL CLR W FOAM CUSHIONS AND 14FT SUPL TB 3 CHN